# Patient Record
Sex: FEMALE | Race: WHITE | Employment: OTHER | URBAN - METROPOLITAN AREA
[De-identification: names, ages, dates, MRNs, and addresses within clinical notes are randomized per-mention and may not be internally consistent; named-entity substitution may affect disease eponyms.]

---

## 2017-02-28 ENCOUNTER — TELEPHONE (OUTPATIENT)
Dept: CARDIOLOGY CLINIC | Age: 72
End: 2017-02-28

## 2017-05-31 ENCOUNTER — OFFICE VISIT (OUTPATIENT)
Dept: ORTHOPEDIC SURGERY | Age: 72
End: 2017-05-31

## 2017-05-31 VITALS — HEIGHT: 63 IN | WEIGHT: 247 LBS | BODY MASS INDEX: 43.77 KG/M2 | RESPIRATION RATE: 18 BRPM

## 2017-05-31 DIAGNOSIS — M54.42 CHRONIC LEFT-SIDED LOW BACK PAIN WITH LEFT-SIDED SCIATICA: Primary | ICD-10-CM

## 2017-05-31 DIAGNOSIS — R52 PAIN: ICD-10-CM

## 2017-05-31 DIAGNOSIS — G89.29 CHRONIC LEFT-SIDED LOW BACK PAIN WITH LEFT-SIDED SCIATICA: Primary | ICD-10-CM

## 2017-05-31 DIAGNOSIS — M16.12 PRIMARY OSTEOARTHRITIS OF LEFT HIP: ICD-10-CM

## 2017-05-31 DIAGNOSIS — E66.01 MORBID OBESITY WITH BMI OF 40.0-44.9, ADULT (HCC): ICD-10-CM

## 2017-05-31 PROCEDURE — 73502 X-RAY EXAM HIP UNI 2-3 VIEWS: CPT | Performed by: ORTHOPAEDIC SURGERY

## 2017-05-31 PROCEDURE — 99204 OFFICE O/P NEW MOD 45 MIN: CPT | Performed by: ORTHOPAEDIC SURGERY

## 2017-05-31 ASSESSMENT — ENCOUNTER SYMPTOMS
BACK PAIN: 1
GASTROINTESTINAL NEGATIVE: 1
RESPIRATORY NEGATIVE: 1
EYES NEGATIVE: 1

## 2017-06-09 ENCOUNTER — HOSPITAL ENCOUNTER (OUTPATIENT)
Dept: PHYSICAL THERAPY | Age: 72
Discharge: HOME | End: 2017-06-30
Attending: ORTHOPAEDIC SURGERY | Admitting: ORTHOPAEDIC SURGERY

## 2017-06-09 ASSESSMENT — PAIN DESCRIPTION - LOCATION: LOCATION: BACK;LEG;KNEE

## 2017-06-09 ASSESSMENT — PAIN DESCRIPTION - FREQUENCY: FREQUENCY: CONTINUOUS

## 2017-06-09 ASSESSMENT — PAIN DESCRIPTION - PAIN TYPE: TYPE: CHRONIC PAIN

## 2017-06-09 ASSESSMENT — PAIN DESCRIPTION - DESCRIPTORS: DESCRIPTORS: BURNING

## 2017-06-09 ASSESSMENT — PAIN DESCRIPTION - ORIENTATION: ORIENTATION: LEFT

## 2017-06-09 ASSESSMENT — PAIN DESCRIPTION - PROGRESSION: CLINICAL_PROGRESSION: GRADUALLY WORSENING

## 2017-06-09 ASSESSMENT — PAIN SCALES - GENERAL: PAINLEVEL_OUTOF10: 3

## 2017-06-09 ASSESSMENT — PAIN DESCRIPTION - ONSET: ONSET: ON-GOING

## 2017-06-17 PROBLEM — G45.9 TIA (TRANSIENT ISCHEMIC ATTACK): Status: ACTIVE | Noted: 2017-06-17

## 2017-06-23 ENCOUNTER — TELEPHONE (OUTPATIENT)
Dept: CARDIOLOGY CLINIC | Age: 72
End: 2017-06-23

## 2017-06-23 DIAGNOSIS — R53.83 FATIGUE, UNSPECIFIED TYPE: Primary | ICD-10-CM

## 2017-06-23 DIAGNOSIS — I20.0 UNSTABLE ANGINA (HCC): ICD-10-CM

## 2017-06-26 ENCOUNTER — PROCEDURE VISIT (OUTPATIENT)
Dept: CARDIOLOGY CLINIC | Age: 72
End: 2017-06-26

## 2017-06-26 DIAGNOSIS — I20.0 UNSTABLE ANGINA (HCC): ICD-10-CM

## 2017-06-26 DIAGNOSIS — R53.83 FATIGUE, UNSPECIFIED TYPE: Primary | ICD-10-CM

## 2017-06-26 LAB
LV EF: 58 %
LVEF MODALITY: NORMAL

## 2017-06-26 PROCEDURE — 93306 TTE W/DOPPLER COMPLETE: CPT | Performed by: INTERNAL MEDICINE

## 2017-06-29 ENCOUNTER — TELEPHONE (OUTPATIENT)
Dept: CARDIOLOGY CLINIC | Age: 72
End: 2017-06-29

## 2017-07-01 ENCOUNTER — HOSPITAL ENCOUNTER (OUTPATIENT)
Dept: OTHER | Age: 72
Discharge: HOME | End: 2017-07-31
Attending: ORTHOPAEDIC SURGERY | Admitting: ORTHOPAEDIC SURGERY

## 2017-09-16 PROBLEM — R06.00 DYSPNEA: Status: ACTIVE | Noted: 2017-09-16

## 2017-09-16 PROBLEM — R07.9 CHEST PAIN: Status: ACTIVE | Noted: 2017-09-16

## 2017-09-16 PROBLEM — R47.9 SPEECH DISTURBANCE: Status: ACTIVE | Noted: 2017-09-16

## 2017-09-16 PROBLEM — R29.810 FACIAL DROOP: Status: ACTIVE | Noted: 2017-09-16

## 2017-09-16 PROBLEM — D64.9 ANEMIA: Chronic | Status: ACTIVE | Noted: 2017-09-16

## 2017-09-16 PROBLEM — F41.9 ANXIETY: Chronic | Status: ACTIVE | Noted: 2017-09-16

## 2017-09-16 PROBLEM — N18.30 CKD (CHRONIC KIDNEY DISEASE) STAGE 3, GFR 30-59 ML/MIN (HCC): Chronic | Status: ACTIVE | Noted: 2017-09-16

## 2017-09-19 PROBLEM — R06.00 DYSPNEA: Status: RESOLVED | Noted: 2017-09-16 | Resolved: 2017-09-19

## 2017-09-19 PROBLEM — E72.11 HYPERHOMOCYSTEINEMIA (HCC): Status: ACTIVE | Noted: 2017-09-19

## 2017-09-19 PROBLEM — R07.9 CHEST PAIN: Status: RESOLVED | Noted: 2017-09-16 | Resolved: 2017-09-19

## 2017-09-19 PROBLEM — R47.9 SPEECH DISTURBANCE: Status: RESOLVED | Noted: 2017-09-16 | Resolved: 2017-09-19

## 2017-09-19 PROBLEM — R29.810 FACIAL DROOP: Status: RESOLVED | Noted: 2017-09-16 | Resolved: 2017-09-19

## 2018-04-11 PROBLEM — R07.9 CHEST PAIN WITH HIGH RISK OF ACUTE CORONARY SYNDROME: Status: ACTIVE | Noted: 2018-04-11

## 2018-08-02 PROBLEM — E66.812 CLASS 2 OBESITY IN ADULT: Status: ACTIVE | Noted: 2018-08-02

## 2018-08-02 PROBLEM — E66.9 CLASS 2 OBESITY IN ADULT: Status: ACTIVE | Noted: 2018-08-02

## 2018-08-02 PROBLEM — R29.90 STROKE-LIKE SYMPTOMS: Status: ACTIVE | Noted: 2018-08-02

## 2018-08-02 PROBLEM — E11.65 HYPERGLYCEMIA DUE TO TYPE 2 DIABETES MELLITUS (HCC): Status: ACTIVE | Noted: 2018-08-02

## 2018-08-02 PROBLEM — I10 ESSENTIAL HYPERTENSION: Status: ACTIVE | Noted: 2018-08-02

## 2018-08-02 PROBLEM — R20.2 PARESTHESIAS IN RIGHT HAND: Status: ACTIVE | Noted: 2018-08-02

## 2018-08-02 PROBLEM — R73.9 ACUTE HYPERGLYCEMIA: Status: ACTIVE | Noted: 2018-08-02

## 2018-09-05 ENCOUNTER — OFFICE VISIT (OUTPATIENT)
Dept: CARDIOLOGY CLINIC | Age: 73
End: 2018-09-05

## 2018-09-05 VITALS
SYSTOLIC BLOOD PRESSURE: 126 MMHG | WEIGHT: 225.4 LBS | BODY MASS INDEX: 39.93 KG/M2 | HEART RATE: 64 BPM | DIASTOLIC BLOOD PRESSURE: 68 MMHG | RESPIRATION RATE: 20 BRPM

## 2018-09-05 DIAGNOSIS — I10 ESSENTIAL HYPERTENSION: Primary | ICD-10-CM

## 2018-09-05 DIAGNOSIS — G45.9 TRANSIENT CEREBRAL ISCHEMIA, UNSPECIFIED TYPE: ICD-10-CM

## 2018-09-05 DIAGNOSIS — E78.5 DYSLIPIDEMIA: ICD-10-CM

## 2018-09-05 PROCEDURE — 99213 OFFICE O/P EST LOW 20 MIN: CPT | Performed by: NURSE PRACTITIONER

## 2018-09-05 RX ORDER — LISINOPRIL AND HYDROCHLOROTHIAZIDE 12.5; 1 MG/1; MG/1
1 TABLET ORAL DAILY
COMMUNITY
End: 2019-10-30 | Stop reason: SDUPTHER

## 2018-09-05 NOTE — PROGRESS NOTES
Office visit for surgical clearance       Raymundo Canseco is a 68 y.o. female with TIA, HTN and dyslipidemia  being seen today for cardiac clearance for hip replacement surgery. HPI : Patient does not have history of CAD with no angina. Last cardiac cath was 2016 that showed no significant CAD. The patient's functional status is fair. She reports they cannot walk up a flight if stairs/walk at least a block d/t hip and knee pain. There is no history of Systolic / Diastolic CHF, last known EF is  59%. There is not a history of VHD wth mitral/aortic. There is not a history of a-fib and the patient is not on anticoagulation. The patient is on antiplatelet therapy.                                         Ezequiel Schlatter has the following history:     Patient Active Problem List    Diagnosis Date Noted    Intercostal pain      Priority: High    Essential hypertension      Priority: High    Dyslipidemia      Priority: High    Encounter for screening for diabetes mellitus      Priority: High    Paresthesias in right hand 08/02/2018    Essential hypertension 08/02/2018    Stroke-like symptoms 08/02/2018    [facility]gly[facility] to [facility] 08/02/2018    Class 2 obesity in adult 08/02/2018    Chest pain with high risk of acute coronary syndrome 04/11/2018    Hyperhomocysteinemia ([facility] 09/19/2017    Chest pain 09/16/2017    Anxiety 09/16/2017    Anemia 09/16/2017    CKD (chronic kidney disease) stage 3, GFR 30-59 ml/min 09/16/2017    TIA (transient ischemic attack) 06/17/2017    Chronic left-sided low back pain with left-sided sciatica 05/31/2017    Primary osteoarthritis of left hip 05/31/2017    Morbid obesity with BMI of 40.0-44.9, adult ([facility] 05/31/2017    Unstable angina (HCC)     Chronic kidney disease, stage III (moderate) 08/02/2016    Arthritis 08/02/2016    Abdominal pain 09/07/2013    Status post gastric bypass for obesity 09/07/2013    CHARLIE (generalized anxiety disorder) 09/07/2013    HTN (hypertension) 09/07/2013    Hyperlipidemia 09/07/2013       Current Outpatient Prescriptions   Medication Sig Dispense Refill    aspirin 81 MG EC tablet Take 1 tablet by mouth daily 30 tablet 3    vitamin B-6 (PYRIDOXINE) 50 MG tablet Take 100 mg by mouth daily      Cyanocobalamin (VITAMIN B 12 PO) Take 1 tablet by mouth daily      oxyCODONE-acetaminophen (PERCOCET) 7.5-325 MG per tablet Take 1 tablet by mouth 3 times daily as needed. Kelin Sanonstein 0    diclofenac sodium 1 % GEL Apply topically 3 times daily as needed 04/11/18 Applies to lower back area and  down      amitriptyline (ELAVIL) 25 MG tablet Take 25 mg by mouth nightly      ferrous sulfate 325 (65 Fe) MG tablet Take 325 mg by mouth daily (with breakfast)      pravastatin (PRAVACHOL) 40 MG tablet Take 40 mg by mouth nightly      ALPRAZolam (XANAX) 1 MG tablet Take 1 mg by mouth daily. Maylon Electra gabapentin (NEURONTIN) 600 MG tablet Take 600 mg by mouth 3 times daily. Maylon Electra sertraline (ZOLOFT) 100 MG tablet Take 100 mg by mouth nightly      Turmeric 500 MG TABS Take 500 mg by mouth daily      diphenhydrAMINE (BENADRYL) 25 MG tablet Take 25 mg by mouth nightly        MG capsule Take 100 mg by mouth daily      folic acid-pyridoxine-cyancobalamin (FOLTX) 1.13-25-2 MG TABS Take 1 tablet by mouth daily 30 tablet 3    dipyridamole-aspirin (AGGRENOX)  MG per extended release capsule Take 1 capsule by mouth 2 times daily 60 capsule 3    midodrine (PROAMATINE) 5 MG tablet Take 1 tablet by mouth 2 times daily (with meals) 90 tablet 3    celecoxib (CELEBREX) 200 MG capsule Take 200 mg by mouth daily      Omega-3 Fatty Acids (FISH OIL PO) Take 1 capsule by mouth daily       acetaminophen (TYLENOL) 500 MG tablet Take 500 mg by mouth as needed for Pain      omeprazole (PRILOSEC) 20 MG capsule Take 20 mg by mouth 2 times daily       Calcium Citrate-Vitamin D 200-200 MG-UNIT TABS Take  by mouth daily.          No current facility-administered medications for this visit. Allergies   Allergen Reactions    Latex        Past Medical History:   Diagnosis Date    (HFpEF) heart failure with preserved ejection fraction (HCC)     Chronic anemia     Chronic kidney disease     Chronic kidney disease, stage III (moderate) 8/2/2016    Chronic pain     right hip and knee, pain management, Ligia Menjivar PA-C [place]    Diabetes mellitus ([facility]     H/O percutaneous left heart catheterization 12/13/2016    False positive. No CAD    History of nuclear stress test 12/06/2016    lexiscan-mild ischemia mid inferior,EF70%    Hx of echocardiogram 06/26/2017    EF 55-60%. Grade 1 diastolic dysfunction.  Hyperhomocysteinemia ([facility]     Hyperlipidemia     Hypertension     Neuropathy     Orthostasis      Past Surgical History:   Procedure Laterality Date    CHOLECYSTECTOMY      EYE SURGERY      GASTRIC BYPASS SURGERY  2010    HYSTERECTOMY      TOTAL KNEE ARTHROPLASTY Right      Social History   Substance Use Topics    Smoking status: Never Smoker    Smokeless tobacco: Never Used    Alcohol use No        Review of Systems:    Constitutional: Negative for diaphoresis and fatigue  Psychological:Negative for anxiety or depression  HENT: Negative for headaches, nasal congestion, sinus pain or vertigo  Eyes: Negative for visual disturbance.    Endocrine: Negative for polydipsia/polyuria  Respiratory: Negative for shortness of breath  Cardiovascular: Negative for chest pain, dyspnea on exertion, claudication, edema, irregular heartbeat, murmur, palpitations or shortness of breath  Gastrointestinal: Negative for abdominal pain or heartburn  Genito-Urinary: Negative for urinary frequency/urgency  Musculoskeletal: Negative for muscle pain, muscular weakness, negative for pain in arm and leg or swelling in foot and leg  Neurological: Negative for dizziness, headaches, memory loss, numbness/tingling, visual changes,

## 2018-09-10 ENCOUNTER — HOSPITAL ENCOUNTER (OUTPATIENT)
Dept: OTHER | Age: 73
Discharge: HOME | End: 2018-09-10
Attending: ORTHOPAEDIC SURGERY | Admitting: ORTHOPAEDIC SURGERY

## 2018-09-14 ENCOUNTER — TELEPHONE (OUTPATIENT)
Dept: CARDIOLOGY CLINIC | Age: 73
End: 2018-09-14

## 2018-10-26 LAB
AVERAGE GLUCOSE: 95
HBA1C MFR BLD: 5.3 %

## 2019-02-19 ENCOUNTER — TELEPHONE (OUTPATIENT)
Dept: CARDIOLOGY CLINIC | Age: 74
End: 2019-02-19

## 2019-03-04 ENCOUNTER — OFFICE VISIT (OUTPATIENT)
Dept: CARDIOLOGY CLINIC | Age: 74
End: 2019-03-04
Payer: COMMERCIAL

## 2019-03-04 VITALS
BODY MASS INDEX: 40.36 KG/M2 | HEIGHT: 63 IN | SYSTOLIC BLOOD PRESSURE: 118 MMHG | HEART RATE: 60 BPM | DIASTOLIC BLOOD PRESSURE: 80 MMHG | WEIGHT: 227.8 LBS

## 2019-03-04 DIAGNOSIS — E78.5 DYSLIPIDEMIA: Primary | ICD-10-CM

## 2019-03-04 PROCEDURE — 1101F PT FALLS ASSESS-DOCD LE1/YR: CPT | Performed by: INTERNAL MEDICINE

## 2019-03-04 PROCEDURE — 4040F PNEUMOC VAC/ADMIN/RCVD: CPT | Performed by: INTERNAL MEDICINE

## 2019-03-04 PROCEDURE — 99214 OFFICE O/P EST MOD 30 MIN: CPT | Performed by: INTERNAL MEDICINE

## 2019-03-04 PROCEDURE — G8599 NO ASA/ANTIPLAT THER USE RNG: HCPCS | Performed by: INTERNAL MEDICINE

## 2019-03-04 PROCEDURE — G8427 DOCREV CUR MEDS BY ELIG CLIN: HCPCS | Performed by: INTERNAL MEDICINE

## 2019-03-04 PROCEDURE — 1090F PRES/ABSN URINE INCON ASSESS: CPT | Performed by: INTERNAL MEDICINE

## 2019-03-04 PROCEDURE — G8400 PT W/DXA NO RESULTS DOC: HCPCS | Performed by: INTERNAL MEDICINE

## 2019-03-04 PROCEDURE — 1036F TOBACCO NON-USER: CPT | Performed by: INTERNAL MEDICINE

## 2019-03-04 PROCEDURE — 1123F ACP DISCUSS/DSCN MKR DOCD: CPT | Performed by: INTERNAL MEDICINE

## 2019-03-04 PROCEDURE — G8484 FLU IMMUNIZE NO ADMIN: HCPCS | Performed by: INTERNAL MEDICINE

## 2019-03-04 PROCEDURE — 3017F COLORECTAL CA SCREEN DOC REV: CPT | Performed by: INTERNAL MEDICINE

## 2019-03-04 PROCEDURE — G8417 CALC BMI ABV UP PARAM F/U: HCPCS | Performed by: INTERNAL MEDICINE

## 2019-03-05 ENCOUNTER — TELEPHONE (OUTPATIENT)
Dept: CARDIOLOGY CLINIC | Age: 74
End: 2019-03-05

## 2019-05-07 RX ORDER — MIDODRINE HYDROCHLORIDE 5 MG/1
5 TABLET ORAL 2 TIMES DAILY WITH MEALS
Qty: 180 TABLET | Refills: 0 | Status: SHIPPED | OUTPATIENT
Start: 2019-05-07 | End: 2019-10-30 | Stop reason: SDUPTHER

## 2019-05-07 RX ORDER — GABAPENTIN 600 MG/1
600 TABLET ORAL 3 TIMES DAILY
Qty: 90 TABLET | Refills: 0 | Status: SHIPPED | OUTPATIENT
Start: 2019-05-07 | End: 2019-07-01 | Stop reason: SDUPTHER

## 2019-05-15 ENCOUNTER — TELEPHONE (OUTPATIENT)
Dept: FAMILY MEDICINE CLINIC | Age: 74
End: 2019-05-15

## 2019-05-16 ENCOUNTER — TELEPHONE (OUTPATIENT)
Dept: FAMILY MEDICINE CLINIC | Age: 74
End: 2019-05-16

## 2019-05-16 NOTE — TELEPHONE ENCOUNTER
----- Message from Naida Sanon sent at 5/16/2019 11:12 AM EDT -----  Contact: CAROL  XANAX 0.5 MG [facility] T 2ND TIME CALLING. WOULD LIKE A CALL WHEN ITS DONE

## 2019-05-17 RX ORDER — OMEPRAZOLE 20 MG/1
CAPSULE, DELAYED RELEASE ORAL
Qty: 180 CAPSULE | Refills: 1 | Status: SHIPPED | OUTPATIENT
Start: 2019-05-17 | End: 2019-10-30 | Stop reason: SDUPTHER

## 2019-06-05 DIAGNOSIS — D64.9 NORMOCYTIC ANEMIA: ICD-10-CM

## 2019-06-05 DIAGNOSIS — E66.01 MORBID OBESITY (HCC): ICD-10-CM

## 2019-06-05 DIAGNOSIS — F32.A MODERATE DEPRESSIVE DISORDER: ICD-10-CM

## 2019-06-05 RX ORDER — FOLIC ACID 1 MG/1
1 TABLET ORAL DAILY
COMMUNITY
End: 2019-06-24

## 2019-06-05 RX ORDER — CLOPIDOGREL BISULFATE 75 MG/1
75 TABLET ORAL DAILY
COMMUNITY
End: 2019-06-24

## 2019-06-05 RX ORDER — RANITIDINE 150 MG/1
150 CAPSULE ORAL 2 TIMES DAILY
COMMUNITY
End: 2019-06-24

## 2019-06-05 RX ORDER — DOCUSATE SODIUM 100 MG/1
100 CAPSULE, LIQUID FILLED ORAL 2 TIMES DAILY
COMMUNITY
End: 2019-07-26 | Stop reason: SDUPTHER

## 2019-06-17 RX ORDER — ASPIRIN AND DIPYRIDAMOLE 25; 200 MG/1; MG/1
CAPSULE, EXTENDED RELEASE ORAL
Qty: 180 CAPSULE | Refills: 1 | Status: SHIPPED | OUTPATIENT
Start: 2019-06-17 | End: 2019-10-30 | Stop reason: SDUPTHER

## 2019-06-17 NOTE — TELEPHONE ENCOUNTER
----- Message from Erlinda Munoz sent at 6/17/2019  3:24 PM EDT -----  Contact: [facility]  PLAN OF CARE OF FAXED ON 5/30. DID WE EVER RECEIVE IT? HAS YET TO GET IT BACK.    [phone]

## 2019-06-24 ENCOUNTER — OFFICE VISIT (OUTPATIENT)
Dept: FAMILY MEDICINE CLINIC | Age: 74
End: 2019-06-24
Payer: COMMERCIAL

## 2019-06-24 VITALS
DIASTOLIC BLOOD PRESSURE: 62 MMHG | HEIGHT: 61 IN | TEMPERATURE: 98.4 F | BODY MASS INDEX: 42.18 KG/M2 | SYSTOLIC BLOOD PRESSURE: 102 MMHG | OXYGEN SATURATION: 95 % | HEART RATE: 52 BPM | WEIGHT: 223.4 LBS

## 2019-06-24 DIAGNOSIS — M15.9 PRIMARY OSTEOARTHRITIS INVOLVING MULTIPLE JOINTS: ICD-10-CM

## 2019-06-24 DIAGNOSIS — F32.A MODERATE DEPRESSIVE DISORDER: ICD-10-CM

## 2019-06-24 DIAGNOSIS — M48.062 SPINAL STENOSIS OF LUMBAR REGION WITH NEUROGENIC CLAUDICATION: ICD-10-CM

## 2019-06-24 DIAGNOSIS — G47.33 OBSTRUCTIVE SLEEP APNEA SYNDROME: ICD-10-CM

## 2019-06-24 DIAGNOSIS — F41.1 GAD (GENERALIZED ANXIETY DISORDER): ICD-10-CM

## 2019-06-24 DIAGNOSIS — I10 ESSENTIAL HYPERTENSION: ICD-10-CM

## 2019-06-24 DIAGNOSIS — E11.9 TYPE 2 DIABETES MELLITUS WITHOUT COMPLICATION, WITHOUT LONG-TERM CURRENT USE OF INSULIN (HCC): Primary | ICD-10-CM

## 2019-06-24 LAB
BILIRUBIN, POC: NORMAL
BLOOD URINE, POC: NORMAL
CLARITY, POC: NORMAL
COLOR, POC: NORMAL
GLUCOSE URINE, POC: NORMAL
KETONES, POC: NORMAL
LEUKOCYTE EST, POC: POSITIVE
NITRITE, POC: NORMAL
PH, POC: 5
PROTEIN, POC: NORMAL
SPECIFIC GRAVITY, POC: 1.02
UROBILINOGEN, POC: NORMAL

## 2019-06-24 PROCEDURE — 99214 OFFICE O/P EST MOD 30 MIN: CPT | Performed by: FAMILY MEDICINE

## 2019-06-24 RX ORDER — PREDNISONE 20 MG/1
TABLET ORAL
Qty: 9 TABLET | Refills: 0 | Status: SHIPPED | OUTPATIENT
Start: 2019-06-24 | End: 2019-10-30

## 2019-06-24 RX ORDER — AMITRIPTYLINE HYDROCHLORIDE 50 MG/1
50 TABLET, FILM COATED ORAL NIGHTLY
Qty: 90 TABLET | Refills: 1 | Status: SHIPPED | OUTPATIENT
Start: 2019-06-24 | End: 2019-10-30 | Stop reason: SDUPTHER

## 2019-06-24 ASSESSMENT — ENCOUNTER SYMPTOMS
RHINORRHEA: 0
SINUS PRESSURE: 0
SHORTNESS OF BREATH: 0
CHEST TIGHTNESS: 0
CONSTIPATION: 0
SORE THROAT: 0
ABDOMINAL PAIN: 0
DIARRHEA: 0
COUGH: 0

## 2019-06-25 ENCOUNTER — TELEPHONE (OUTPATIENT)
Dept: FAMILY MEDICINE CLINIC | Age: 74
End: 2019-06-25

## 2019-06-25 NOTE — TELEPHONE ENCOUNTER
CALL FROM PHARMACY, NEED TO CLARIFY INFO ON RX HANDWRITTEN. PLEASE CALL.  /GLO  Electronically signed by Mendel Rear, MA on 6/25/2019 at 9:46 AM

## 2019-06-27 ENCOUNTER — TELEPHONE (OUTPATIENT)
Dept: FAMILY MEDICINE CLINIC | Age: 74
End: 2019-06-27

## 2019-06-27 NOTE — TELEPHONE ENCOUNTER
----- Message from Anjelica Garcia sent at 6/27/2019 10:20 AM EDT -----  Contact: [address] [place] [address] - NEED TO KNOW HOW MANY TIMES A DAY SHE TESTS.    [phone]

## 2019-06-28 ENCOUNTER — TELEPHONE (OUTPATIENT)
Dept: FAMILY MEDICINE CLINIC | Age: 74
End: 2019-06-28

## 2019-06-28 NOTE — TELEPHONE ENCOUNTER
----- Message from Chalo Ann sent at 6/27/2019 10:20 AM EDT -----  Contact: [address] [place] [address] - NEED TO KNOW HOW MANY TIMES A DAY SHE TESTS.    [phone]

## 2019-07-02 ENCOUNTER — TELEPHONE (OUTPATIENT)
Dept: FAMILY MEDICINE CLINIC | Age: 74
End: 2019-07-02

## 2019-07-17 ENCOUNTER — OFFICE VISIT (OUTPATIENT)
Dept: FAMILY MEDICINE CLINIC | Age: 74
End: 2019-07-17
Payer: COMMERCIAL

## 2019-07-17 VITALS
WEIGHT: 224.9 LBS | OXYGEN SATURATION: 97 % | HEART RATE: 55 BPM | DIASTOLIC BLOOD PRESSURE: 84 MMHG | BODY MASS INDEX: 42.46 KG/M2 | HEIGHT: 61 IN | SYSTOLIC BLOOD PRESSURE: 126 MMHG

## 2019-07-17 DIAGNOSIS — I10 ESSENTIAL HYPERTENSION: ICD-10-CM

## 2019-07-17 DIAGNOSIS — E11.9 TYPE 2 DIABETES MELLITUS WITHOUT COMPLICATION, WITHOUT LONG-TERM CURRENT USE OF INSULIN (HCC): ICD-10-CM

## 2019-07-17 DIAGNOSIS — N18.30 CHRONIC KIDNEY DISEASE, STAGE III (MODERATE) (HCC): ICD-10-CM

## 2019-07-17 DIAGNOSIS — M54.50 BILATERAL LOW BACK PAIN WITHOUT SCIATICA, UNSPECIFIED CHRONICITY: ICD-10-CM

## 2019-07-17 DIAGNOSIS — E72.11 HYPERHOMOCYSTEINEMIA (HCC): ICD-10-CM

## 2019-07-17 DIAGNOSIS — R35.0 URINARY FREQUENCY: ICD-10-CM

## 2019-07-17 DIAGNOSIS — E11.9 TYPE 2 DIABETES MELLITUS WITHOUT COMPLICATION, WITHOUT LONG-TERM CURRENT USE OF INSULIN (HCC): Primary | ICD-10-CM

## 2019-07-17 DIAGNOSIS — D64.9 NORMOCYTIC ANEMIA: ICD-10-CM

## 2019-07-17 DIAGNOSIS — M15.9 PRIMARY OSTEOARTHRITIS INVOLVING MULTIPLE JOINTS: ICD-10-CM

## 2019-07-17 LAB
A/G RATIO: 1.6 (ref 1.1–2.2)
ALBUMIN SERPL-MCNC: 4.2 G/DL (ref 3.4–5)
ALP BLD-CCNC: 96 U/L (ref 40–129)
ALT SERPL-CCNC: 11 U/L (ref 10–40)
ANION GAP SERPL CALCULATED.3IONS-SCNC: 15 MMOL/L (ref 3–16)
AST SERPL-CCNC: 19 U/L (ref 15–37)
BASOPHILS ABSOLUTE: 0 K/UL (ref 0–0.2)
BASOPHILS RELATIVE PERCENT: 0.5 %
BILIRUB SERPL-MCNC: 0.3 MG/DL (ref 0–1)
BILIRUBIN URINE: NEGATIVE
BLOOD, URINE: NEGATIVE
BUN BLDV-MCNC: 15 MG/DL (ref 7–20)
CALCIUM SERPL-MCNC: 9.5 MG/DL (ref 8.3–10.6)
CHLORIDE BLD-SCNC: 106 MMOL/L (ref 99–110)
CHOLESTEROL, TOTAL: 159 MG/DL (ref 0–199)
CLARITY: CLEAR
CO2: 22 MMOL/L (ref 21–32)
COLOR: YELLOW
CREAT SERPL-MCNC: 0.9 MG/DL (ref 0.6–1.2)
CREATININE URINE: 79.5 MG/DL (ref 28–259)
EOSINOPHILS ABSOLUTE: 0.1 K/UL (ref 0–0.6)
EOSINOPHILS RELATIVE PERCENT: 2 %
EPITHELIAL CELLS, UA: 2 /HPF (ref 0–5)
FERRITIN: 110.2 NG/ML (ref 15–150)
FOLATE: 15.84 NG/ML (ref 4.78–24.2)
GFR AFRICAN AMERICAN: >60
GFR NON-AFRICAN AMERICAN: >60
GLOBULIN: 2.6 G/DL
GLUCOSE BLD-MCNC: 93 MG/DL (ref 70–99)
GLUCOSE URINE: NEGATIVE MG/DL
HCT VFR BLD CALC: 35.3 % (ref 36–48)
HDLC SERPL-MCNC: 74 MG/DL (ref 40–60)
HEMOGLOBIN: 11.6 G/DL (ref 12–16)
HOMOCYSTEINE: 8 UMOL/L (ref 0–10)
HYALINE CASTS: 1 /LPF (ref 0–8)
IRON SATURATION: 29 % (ref 15–50)
IRON: 88 UG/DL (ref 37–145)
KETONES, URINE: NEGATIVE MG/DL
LDL CHOLESTEROL CALCULATED: 64 MG/DL
LEUKOCYTE ESTERASE, URINE: NEGATIVE
LYMPHOCYTES ABSOLUTE: 1.8 K/UL (ref 1–5.1)
LYMPHOCYTES RELATIVE PERCENT: 36.8 %
MCH RBC QN AUTO: 28.7 PG (ref 26–34)
MCHC RBC AUTO-ENTMCNC: 32.7 G/DL (ref 31–36)
MCV RBC AUTO: 87.8 FL (ref 80–100)
MICROALBUMIN UR-MCNC: <1.2 MG/DL
MICROALBUMIN/CREAT UR-RTO: NORMAL MG/G (ref 0–30)
MICROSCOPIC EXAMINATION: NORMAL
MONOCYTES ABSOLUTE: 0.3 K/UL (ref 0–1.3)
MONOCYTES RELATIVE PERCENT: 5.3 %
NEUTROPHILS ABSOLUTE: 2.6 K/UL (ref 1.7–7.7)
NEUTROPHILS RELATIVE PERCENT: 55.4 %
NITRITE, URINE: NEGATIVE
PDW BLD-RTO: 13.9 % (ref 12.4–15.4)
PH UA: 6 (ref 5–8)
PLATELET # BLD: 309 K/UL (ref 135–450)
PMV BLD AUTO: 8.2 FL (ref 5–10.5)
POTASSIUM SERPL-SCNC: 4.2 MMOL/L (ref 3.5–5.1)
PROTEIN UA: NEGATIVE MG/DL
RBC # BLD: 4.03 M/UL (ref 4–5.2)
RBC UA: 0 /HPF (ref 0–4)
SODIUM BLD-SCNC: 143 MMOL/L (ref 136–145)
SPECIFIC GRAVITY UA: 1.01 (ref 1–1.03)
TOTAL IRON BINDING CAPACITY: 300 UG/DL (ref 260–445)
TOTAL PROTEIN: 6.8 G/DL (ref 6.4–8.2)
TRIGL SERPL-MCNC: 107 MG/DL (ref 0–150)
TSH REFLEX FT4: 1.62 UIU/ML (ref 0.27–4.2)
UROBILINOGEN, URINE: 0.2 E.U./DL
VLDLC SERPL CALC-MCNC: 21 MG/DL
WBC # BLD: 4.8 K/UL (ref 4–11)
WBC UA: 1 /HPF (ref 0–5)

## 2019-07-17 PROCEDURE — 99213 OFFICE O/P EST LOW 20 MIN: CPT | Performed by: PHYSICIAN ASSISTANT

## 2019-07-17 NOTE — PROGRESS NOTES
total lt shoulder     SHOULDER SURGERY  2006    total rt shoulder    TOTAL KNEE ARTHROPLASTY Right     TOTAL KNEE ARTHROPLASTY  02/2011    left       CURRENT MEDICATIONS  Current Outpatient Medications   Medication Sig Dispense Refill    gabapentin (NEURONTIN) 600 MG tablet TAKE 1 TABLET BY MOUTH THREE TIMES DAILY FOR 30 DAYS 90 tablet 2    amitriptyline (ELAVIL) 50 MG tablet Take 1 tablet by mouth nightly 90 tablet 1    predniSONE (DELTASONE) 20 MG tablet One BID for 3d, then 1 qam for 3d 9 tablet 0    aspirin-dipyridamole (AGGRENOX)  MG per extended release capsule TAKE 1 CAPSULE BY MOUTH TWICE DAILY 180 capsule 1    docusate sodium (COLACE) 100 MG capsule Take 100 mg by mouth 2 times daily      ZINC METHIONATE PO Take by mouth      Ascorbic Acid (VITAMIN C PO) Take by mouth      omeprazole (PRILOSEC) 20 MG delayed release capsule TAKE 1 CAPSULE BY MOUTH TWICE DAILY 180 capsule 1    midodrine (PROAMATINE) 5 MG tablet Take 1 tablet by mouth 2 times daily (with meals) 180 tablet 0    lisinopril-hydrochlorothiazide (PRINZIDE;ZESTORETIC) 10-12.5 MG per tablet Take 1 tablet by mouth daily      aspirin 81 MG EC tablet Take 1 tablet by mouth daily 30 tablet 3    vitamin B-6 (PYRIDOXINE) 50 MG tablet Take 100 mg by mouth daily      oxyCODONE-acetaminophen (PERCOCET) 7.5-325 MG per tablet Take 1 tablet by mouth 3 times daily as needed. Betha Lute 0    diclofenac sodium 1 % GEL Apply topically 3 times daily as needed 04/11/18 Applies to lower back area and  down      ferrous sulfate 325 (65 Fe) MG tablet Take 325 mg by mouth daily (with breakfast)      pravastatin (PRAVACHOL) 40 MG tablet Take 40 mg by mouth nightly      ALPRAZolam (XANAX) 1 MG tablet Take 1 mg by mouth daily. Tresia Mireya sertraline (ZOLOFT) 100 MG tablet Take 100 mg by mouth nightly      diphenhydrAMINE (BENADRYL) 25 MG tablet Take 25 mg by mouth nightly       celecoxib (CELEBREX) 200 MG capsule Take 200 mg by mouth daily      Omega-3 Fatty Acids (FISH OIL PO) Take 1 capsule by mouth daily       acetaminophen (TYLENOL) 500 MG tablet Take 500 mg by mouth as needed for Pain      Calcium Citrate-Vitamin D 200-200 MG-UNIT TABS Take  by mouth daily. No current facility-administered medications for this visit.         ALLERGIES  Allergies   Allergen Reactions    Latex     Percocet [Oxycodone-Acetaminophen] Rash       PHYSICAL EXAM    /84   Pulse 55   Ht 5' 1\" (1.549 m)   Wt 224 lb 14.4 oz (102 kg)   SpO2 97%   BMI 42.49 kg/m²     Constitutional:  Well developed, well nourished  HENT:  Normocephalic, atraumatic, bilateral external ears normal, oropharynx moist, nose normal  Eyes:  PERRLA, EOMI, conjunctiva normal, no discharge, no scleral icterus  Neck:  Normal range of motion, no tenderness, supple, no thyromegaly no carotid bruits noted  Lymphatic:  No lymphadenopathy noted  Cardiovascular:  Normal heart rate, normal rhythm, no murmurs, gallops or rubs  Thorax & Lungs:  Normal breath sounds, no respiratory distress, no wheezing  Abdomen:  Soft, no tenderness, no masses, no pulsatile masses, not distended, bowel sounds normal  Skin:  Warm, dry, no erythema, no rash  Back:  No tenderness, No CVA tenderness  Extremities:  No edema, no tenderness, no cyanosis, no clubbing  Visual inspection:  Deformity/amputation: absent  Skin lesions/pre-ulcerative calluses: absent  Edema: right- negative, left- negative    Sensory exam:  Monofilament sensation: normal  (minimum of 5 random plantar locations tested, avoiding callused areas - > 1 area with absence of sensation is + for neuropathy)    Plus at least one of the following:  Pulses: normal,   Musculoskeletal:  Good range of motion  all major joints, no tenderness to palpation or major deformities noted  Neurologic:  Alert & oriented X 3, normal motor function, normal sensory function, no focal deficits noted  Psychiatric:  Affect normal, mood normal    ASSESSMENT & PLAN    Javy Kwon was seen today

## 2019-07-18 LAB
ESTIMATED AVERAGE GLUCOSE: 131.2 MG/DL
HBA1C MFR BLD: 6.2 %

## 2019-07-18 RX ORDER — ALPRAZOLAM 0.5 MG/1
TABLET ORAL
Qty: 45 TABLET | Refills: 0 | OUTPATIENT
Start: 2019-07-18

## 2019-07-18 NOTE — TELEPHONE ENCOUNTER
Has she seen pain management? We can do an x ray of her Lumbar spine.  Is she still taking her gabapentin and Pain Rx

## 2019-07-19 DIAGNOSIS — F41.9 ANXIETY: Primary | Chronic | ICD-10-CM

## 2019-07-19 LAB — URINE CULTURE, ROUTINE: NORMAL

## 2019-07-19 RX ORDER — ALPRAZOLAM 1 MG/1
TABLET ORAL
Qty: 45 TABLET | Refills: 0 | Status: SHIPPED | OUTPATIENT
Start: 2019-07-19 | End: 2019-08-19

## 2019-07-19 NOTE — TELEPHONE ENCOUNTER
Has she seen pain management? We can do an x ray of her Lumbar spine. Is she still taking her gabapentin and Pain Rx per [facility]      Left message for patient to return call.

## 2019-07-26 RX ORDER — DOCUSATE SODIUM 100 MG/1
CAPSULE ORAL
Qty: 90 CAPSULE | Refills: 1 | Status: SHIPPED | OUTPATIENT
Start: 2019-07-26 | End: 2021-05-28

## 2019-07-26 RX ORDER — CELECOXIB 200 MG/1
CAPSULE ORAL
Qty: 180 CAPSULE | Refills: 1 | Status: SHIPPED | OUTPATIENT
Start: 2019-07-26 | End: 2019-10-30 | Stop reason: SDUPTHER

## 2019-08-12 RX ORDER — HYDROGEN PEROXIDE 2.65 ML/100ML
LIQUID ORAL; TOPICAL
Qty: 30 TABLET | Refills: 2 | Status: SHIPPED | OUTPATIENT
Start: 2019-08-12 | End: 2020-02-04

## 2019-09-19 ENCOUNTER — TELEPHONE (OUTPATIENT)
Dept: FAMILY MEDICINE CLINIC | Age: 74
End: 2019-09-19

## 2019-09-25 ENCOUNTER — TELEPHONE (OUTPATIENT)
Dept: FAMILY MEDICINE CLINIC | Age: 74
End: 2019-09-25

## 2019-09-30 RX ORDER — SERTRALINE HYDROCHLORIDE 100 MG/1
TABLET, FILM COATED ORAL
Qty: 90 TABLET | Refills: 1 | Status: SHIPPED | OUTPATIENT
Start: 2019-09-30 | End: 2019-10-30 | Stop reason: SDUPTHER

## 2019-10-25 DIAGNOSIS — F41.9 ANXIETY: Primary | Chronic | ICD-10-CM

## 2019-10-30 ENCOUNTER — OFFICE VISIT (OUTPATIENT)
Dept: FAMILY MEDICINE CLINIC | Age: 74
End: 2019-10-30
Payer: COMMERCIAL

## 2019-10-30 VITALS
HEART RATE: 56 BPM | BODY MASS INDEX: 42.59 KG/M2 | SYSTOLIC BLOOD PRESSURE: 100 MMHG | WEIGHT: 225.6 LBS | HEIGHT: 61 IN | DIASTOLIC BLOOD PRESSURE: 62 MMHG

## 2019-10-30 DIAGNOSIS — E11.9 TYPE 2 DIABETES MELLITUS WITHOUT COMPLICATION, WITHOUT LONG-TERM CURRENT USE OF INSULIN (HCC): Primary | ICD-10-CM

## 2019-10-30 DIAGNOSIS — F32.9 REACTIVE DEPRESSION: ICD-10-CM

## 2019-10-30 DIAGNOSIS — Z23 NEEDS FLU SHOT: ICD-10-CM

## 2019-10-30 DIAGNOSIS — N18.30 CKD (CHRONIC KIDNEY DISEASE) STAGE 3, GFR 30-59 ML/MIN (HCC): Chronic | ICD-10-CM

## 2019-10-30 DIAGNOSIS — E78.5 DYSLIPIDEMIA: ICD-10-CM

## 2019-10-30 DIAGNOSIS — E11.9 TYPE 2 DIABETES MELLITUS WITHOUT COMPLICATION, WITHOUT LONG-TERM CURRENT USE OF INSULIN (HCC): ICD-10-CM

## 2019-10-30 DIAGNOSIS — M48.062 SPINAL STENOSIS OF LUMBAR REGION WITH NEUROGENIC CLAUDICATION: ICD-10-CM

## 2019-10-30 LAB
BASOPHILS ABSOLUTE: 0 K/UL (ref 0–0.2)
BASOPHILS RELATIVE PERCENT: 0.3 %
EOSINOPHILS ABSOLUTE: 0.1 K/UL (ref 0–0.6)
EOSINOPHILS RELATIVE PERCENT: 2.2 %
HCT VFR BLD CALC: 36.8 % (ref 36–48)
HEMOGLOBIN: 11.8 G/DL (ref 12–16)
LYMPHOCYTES ABSOLUTE: 2.6 K/UL (ref 1–5.1)
LYMPHOCYTES RELATIVE PERCENT: 39.7 %
MCH RBC QN AUTO: 27.9 PG (ref 26–34)
MCHC RBC AUTO-ENTMCNC: 32 G/DL (ref 31–36)
MCV RBC AUTO: 87.3 FL (ref 80–100)
MONOCYTES ABSOLUTE: 0.5 K/UL (ref 0–1.3)
MONOCYTES RELATIVE PERCENT: 7.1 %
NEUTROPHILS ABSOLUTE: 3.3 K/UL (ref 1.7–7.7)
NEUTROPHILS RELATIVE PERCENT: 50.7 %
PDW BLD-RTO: 14.5 % (ref 12.4–15.4)
PLATELET # BLD: 336 K/UL (ref 135–450)
PMV BLD AUTO: 8.3 FL (ref 5–10.5)
RBC # BLD: 4.21 M/UL (ref 4–5.2)
WBC # BLD: 6.6 K/UL (ref 4–11)

## 2019-10-30 PROCEDURE — 1090F PRES/ABSN URINE INCON ASSESS: CPT | Performed by: FAMILY MEDICINE

## 2019-10-30 PROCEDURE — 99214 OFFICE O/P EST MOD 30 MIN: CPT | Performed by: FAMILY MEDICINE

## 2019-10-30 PROCEDURE — 3044F HG A1C LEVEL LT 7.0%: CPT | Performed by: FAMILY MEDICINE

## 2019-10-30 PROCEDURE — G8417 CALC BMI ABV UP PARAM F/U: HCPCS | Performed by: FAMILY MEDICINE

## 2019-10-30 PROCEDURE — 1123F ACP DISCUSS/DSCN MKR DOCD: CPT | Performed by: FAMILY MEDICINE

## 2019-10-30 PROCEDURE — 4040F PNEUMOC VAC/ADMIN/RCVD: CPT | Performed by: FAMILY MEDICINE

## 2019-10-30 PROCEDURE — G8400 PT W/DXA NO RESULTS DOC: HCPCS | Performed by: FAMILY MEDICINE

## 2019-10-30 PROCEDURE — 90653 IIV ADJUVANT VACCINE IM: CPT | Performed by: FAMILY MEDICINE

## 2019-10-30 PROCEDURE — 2022F DILAT RTA XM EVC RTNOPTHY: CPT | Performed by: FAMILY MEDICINE

## 2019-10-30 PROCEDURE — G8482 FLU IMMUNIZE ORDER/ADMIN: HCPCS | Performed by: FAMILY MEDICINE

## 2019-10-30 PROCEDURE — G8598 ASA/ANTIPLAT THER USED: HCPCS | Performed by: FAMILY MEDICINE

## 2019-10-30 PROCEDURE — G0008 ADMIN INFLUENZA VIRUS VAC: HCPCS | Performed by: FAMILY MEDICINE

## 2019-10-30 PROCEDURE — 1036F TOBACCO NON-USER: CPT | Performed by: FAMILY MEDICINE

## 2019-10-30 PROCEDURE — 3017F COLORECTAL CA SCREEN DOC REV: CPT | Performed by: FAMILY MEDICINE

## 2019-10-30 PROCEDURE — G8427 DOCREV CUR MEDS BY ELIG CLIN: HCPCS | Performed by: FAMILY MEDICINE

## 2019-10-30 RX ORDER — FERROUS SULFATE 325(65) MG
325 TABLET ORAL
Qty: 90 TABLET | Refills: 1 | Status: SHIPPED | OUTPATIENT
Start: 2019-10-30 | End: 2020-05-04

## 2019-10-30 RX ORDER — OXYCODONE AND ACETAMINOPHEN 7.5; 325 MG/1; MG/1
1 TABLET ORAL 3 TIMES DAILY PRN
Qty: 90 TABLET | Refills: 0 | Status: SHIPPED | OUTPATIENT
Start: 2019-10-30 | End: 2020-02-04

## 2019-10-30 RX ORDER — LISINOPRIL AND HYDROCHLOROTHIAZIDE 12.5; 1 MG/1; MG/1
1 TABLET ORAL DAILY
Qty: 90 TABLET | Refills: 1 | Status: SHIPPED | OUTPATIENT
Start: 2019-10-30 | End: 2020-02-04

## 2019-10-30 RX ORDER — SERTRALINE HYDROCHLORIDE 100 MG/1
100 TABLET, FILM COATED ORAL DAILY
Qty: 90 TABLET | Refills: 1 | Status: SHIPPED | OUTPATIENT
Start: 2019-10-30 | End: 2019-11-11

## 2019-10-30 RX ORDER — OMEPRAZOLE 20 MG/1
20 CAPSULE, DELAYED RELEASE ORAL 2 TIMES DAILY
Qty: 180 CAPSULE | Refills: 1 | Status: SHIPPED | OUTPATIENT
Start: 2019-10-30 | End: 2020-08-25

## 2019-10-30 RX ORDER — PRAVASTATIN SODIUM 40 MG
40 TABLET ORAL NIGHTLY
Qty: 90 TABLET | Refills: 1 | Status: SHIPPED | OUTPATIENT
Start: 2019-10-30 | End: 2020-02-04

## 2019-10-30 RX ORDER — MIDODRINE HYDROCHLORIDE 5 MG/1
5 TABLET ORAL 2 TIMES DAILY WITH MEALS
Qty: 180 TABLET | Refills: 0 | Status: SHIPPED | OUTPATIENT
Start: 2019-10-30 | End: 2020-02-04

## 2019-10-30 RX ORDER — CELECOXIB 200 MG/1
400 CAPSULE ORAL DAILY
Qty: 180 CAPSULE | Refills: 1 | Status: SHIPPED | OUTPATIENT
Start: 2019-10-30 | End: 2020-05-04

## 2019-10-30 RX ORDER — AMITRIPTYLINE HYDROCHLORIDE 50 MG/1
TABLET, FILM COATED ORAL
Qty: 180 TABLET | Refills: 1 | Status: SHIPPED | OUTPATIENT
Start: 2019-10-30 | End: 2020-03-18 | Stop reason: SDUPTHER

## 2019-10-30 RX ORDER — ASPIRIN AND DIPYRIDAMOLE 25; 200 MG/1; MG/1
1 CAPSULE, EXTENDED RELEASE ORAL 2 TIMES DAILY
Qty: 180 CAPSULE | Refills: 1 | Status: SHIPPED | OUTPATIENT
Start: 2019-10-30 | End: 2020-02-04

## 2019-10-30 RX ORDER — GABAPENTIN 600 MG/1
600 TABLET ORAL 3 TIMES DAILY
Qty: 270 TABLET | Refills: 1 | Status: SHIPPED | OUTPATIENT
Start: 2019-10-30 | End: 2020-02-04

## 2019-10-30 ASSESSMENT — ENCOUNTER SYMPTOMS
CONSTIPATION: 0
COUGH: 0
SHORTNESS OF BREATH: 0
CHEST TIGHTNESS: 0
ABDOMINAL PAIN: 0
SORE THROAT: 0
DIARRHEA: 0
RHINORRHEA: 0
SINUS PRESSURE: 0

## 2019-10-31 LAB
ESTIMATED AVERAGE GLUCOSE: 125.5 MG/DL
HBA1C MFR BLD: 6 %

## 2019-10-31 RX ORDER — ALPRAZOLAM 0.5 MG/1
TABLET ORAL
Qty: 12 TABLET | Refills: 0 | Status: SHIPPED | OUTPATIENT
Start: 2019-10-31 | End: 2019-11-22 | Stop reason: SDUPTHER

## 2019-11-04 ENCOUNTER — TELEPHONE (OUTPATIENT)
Dept: FAMILY MEDICINE CLINIC | Age: 74
End: 2019-11-04

## 2019-11-06 ENCOUNTER — TELEPHONE (OUTPATIENT)
Dept: FAMILY MEDICINE CLINIC | Age: 74
End: 2019-11-06

## 2019-11-08 ENCOUNTER — TELEPHONE (OUTPATIENT)
Dept: FAMILY MEDICINE CLINIC | Age: 74
End: 2019-11-08

## 2019-11-11 RX ORDER — ESCITALOPRAM OXALATE 20 MG/1
20 TABLET ORAL DAILY
Qty: 30 TABLET | Refills: 3 | Status: SHIPPED | OUTPATIENT
Start: 2019-11-11 | End: 2020-02-04

## 2019-11-12 ENCOUNTER — TELEPHONE (OUTPATIENT)
Dept: FAMILY MEDICINE CLINIC | Age: 74
End: 2019-11-12

## 2019-11-19 DIAGNOSIS — F41.9 ANXIETY: Chronic | ICD-10-CM

## 2019-11-19 RX ORDER — ALPRAZOLAM 0.5 MG/1
TABLET ORAL
Qty: 12 TABLET | Refills: 0 | OUTPATIENT
Start: 2019-11-19 | End: 2019-12-19

## 2019-11-22 DIAGNOSIS — F41.9 ANXIETY: Chronic | ICD-10-CM

## 2019-11-23 RX ORDER — ALPRAZOLAM 0.5 MG/1
TABLET ORAL
Qty: 12 TABLET | Refills: 0 | Status: SHIPPED | OUTPATIENT
Start: 2019-11-23 | End: 2019-12-13 | Stop reason: SDUPTHER

## 2019-12-13 ENCOUNTER — OFFICE VISIT (OUTPATIENT)
Dept: FAMILY MEDICINE CLINIC | Age: 74
End: 2019-12-13
Payer: COMMERCIAL

## 2019-12-13 VITALS
TEMPERATURE: 98.4 F | HEIGHT: 62 IN | BODY MASS INDEX: 41.07 KG/M2 | OXYGEN SATURATION: 96 % | SYSTOLIC BLOOD PRESSURE: 108 MMHG | WEIGHT: 223.2 LBS | HEART RATE: 73 BPM | DIASTOLIC BLOOD PRESSURE: 78 MMHG

## 2019-12-13 DIAGNOSIS — R42 DIZZINESS: ICD-10-CM

## 2019-12-13 DIAGNOSIS — F41.9 ANXIETY: Chronic | ICD-10-CM

## 2019-12-13 DIAGNOSIS — I95.9 HYPOTENSION, UNSPECIFIED HYPOTENSION TYPE: ICD-10-CM

## 2019-12-13 DIAGNOSIS — S39.011A STRAIN OF ABDOMINAL MUSCLE, INITIAL ENCOUNTER: Primary | ICD-10-CM

## 2019-12-13 PROCEDURE — 3017F COLORECTAL CA SCREEN DOC REV: CPT | Performed by: NURSE PRACTITIONER

## 2019-12-13 PROCEDURE — G8400 PT W/DXA NO RESULTS DOC: HCPCS | Performed by: NURSE PRACTITIONER

## 2019-12-13 PROCEDURE — 1123F ACP DISCUSS/DSCN MKR DOCD: CPT | Performed by: NURSE PRACTITIONER

## 2019-12-13 PROCEDURE — G8482 FLU IMMUNIZE ORDER/ADMIN: HCPCS | Performed by: NURSE PRACTITIONER

## 2019-12-13 PROCEDURE — 1036F TOBACCO NON-USER: CPT | Performed by: NURSE PRACTITIONER

## 2019-12-13 PROCEDURE — 4040F PNEUMOC VAC/ADMIN/RCVD: CPT | Performed by: NURSE PRACTITIONER

## 2019-12-13 PROCEDURE — 1090F PRES/ABSN URINE INCON ASSESS: CPT | Performed by: NURSE PRACTITIONER

## 2019-12-13 PROCEDURE — G8427 DOCREV CUR MEDS BY ELIG CLIN: HCPCS | Performed by: NURSE PRACTITIONER

## 2019-12-13 PROCEDURE — 99213 OFFICE O/P EST LOW 20 MIN: CPT | Performed by: NURSE PRACTITIONER

## 2019-12-13 PROCEDURE — G8417 CALC BMI ABV UP PARAM F/U: HCPCS | Performed by: NURSE PRACTITIONER

## 2019-12-13 PROCEDURE — G8598 ASA/ANTIPLAT THER USED: HCPCS | Performed by: NURSE PRACTITIONER

## 2019-12-13 RX ORDER — TIZANIDINE 2 MG/1
2 TABLET ORAL 3 TIMES DAILY PRN
Qty: 21 TABLET | Refills: 0 | Status: SHIPPED | OUTPATIENT
Start: 2019-12-13 | End: 2019-12-20

## 2019-12-13 ASSESSMENT — ENCOUNTER SYMPTOMS
HEMATOCHEZIA: 0
VISUAL CHANGE: 0
COUGH: 0
NAUSEA: 0
SWOLLEN GLANDS: 0
CHANGE IN BOWEL HABIT: 0
SORE THROAT: 0
FLATUS: 0
SINUS PRESSURE: 0
CHEST TIGHTNESS: 0
ABDOMINAL PAIN: 1
BELCHING: 0
SINUS PAIN: 0
ABDOMINAL DISTENTION: 0
DIARRHEA: 0
VOMITING: 0
RHINORRHEA: 0
CONSTIPATION: 0
SHORTNESS OF BREATH: 0

## 2019-12-13 ASSESSMENT — CROHNS DISEASE ACTIVITY INDEX (CDAI): CDAI SCORE: 0

## 2019-12-17 RX ORDER — ALPRAZOLAM 0.5 MG/1
TABLET ORAL
Qty: 12 TABLET | Refills: 0 | Status: SHIPPED | OUTPATIENT
Start: 2019-12-17 | End: 2020-01-23 | Stop reason: SDUPTHER

## 2019-12-31 ENCOUNTER — TELEPHONE (OUTPATIENT)
Dept: FAMILY MEDICINE CLINIC | Age: 74
End: 2019-12-31

## 2020-01-23 NOTE — TELEPHONE ENCOUNTER
MED REFILL:    ALPRAZOLAM  0.5 MG  (30 CT)      PHARMACY:  [facility] [address]      PATIENT HAS 1 PILL LEFT ---COULD THIS PLEASE BE CALLED IN TODAY

## 2020-01-24 RX ORDER — ALPRAZOLAM 0.5 MG/1
TABLET ORAL
Qty: 15 TABLET | Refills: 0 | Status: SHIPPED | OUTPATIENT
Start: 2020-01-24 | End: 2020-02-04 | Stop reason: SDUPTHER

## 2020-02-04 ENCOUNTER — OFFICE VISIT (OUTPATIENT)
Dept: FAMILY MEDICINE CLINIC | Age: 75
End: 2020-02-04
Payer: COMMERCIAL

## 2020-02-04 ENCOUNTER — TELEPHONE (OUTPATIENT)
Dept: FAMILY MEDICINE CLINIC | Age: 75
End: 2020-02-04

## 2020-02-04 VITALS
WEIGHT: 220 LBS | SYSTOLIC BLOOD PRESSURE: 128 MMHG | BODY MASS INDEX: 40.48 KG/M2 | HEIGHT: 62 IN | DIASTOLIC BLOOD PRESSURE: 80 MMHG | OXYGEN SATURATION: 95 % | HEART RATE: 73 BPM

## 2020-02-04 DIAGNOSIS — I10 ESSENTIAL HYPERTENSION: ICD-10-CM

## 2020-02-04 DIAGNOSIS — N18.30 CKD (CHRONIC KIDNEY DISEASE) STAGE 3, GFR 30-59 ML/MIN (HCC): Chronic | ICD-10-CM

## 2020-02-04 DIAGNOSIS — D50.9 IRON DEFICIENCY ANEMIA, UNSPECIFIED IRON DEFICIENCY ANEMIA TYPE: Chronic | ICD-10-CM

## 2020-02-04 LAB
ANION GAP SERPL CALCULATED.3IONS-SCNC: 14 MMOL/L (ref 3–16)
BASOPHILS ABSOLUTE: 0 K/UL (ref 0–0.2)
BASOPHILS RELATIVE PERCENT: 0.6 %
BUN BLDV-MCNC: 19 MG/DL (ref 7–20)
CALCIUM SERPL-MCNC: 9.1 MG/DL (ref 8.3–10.6)
CHLORIDE BLD-SCNC: 106 MMOL/L (ref 99–110)
CO2: 24 MMOL/L (ref 21–32)
CREAT SERPL-MCNC: 0.8 MG/DL (ref 0.6–1.2)
EOSINOPHILS ABSOLUTE: 0.1 K/UL (ref 0–0.6)
EOSINOPHILS RELATIVE PERCENT: 2 %
GFR AFRICAN AMERICAN: >60
GFR NON-AFRICAN AMERICAN: >60
GLUCOSE BLD-MCNC: 101 MG/DL (ref 70–99)
HCT VFR BLD CALC: 37.9 % (ref 36–48)
HEMOGLOBIN: 12.3 G/DL (ref 12–16)
LYMPHOCYTES ABSOLUTE: 2 K/UL (ref 1–5.1)
LYMPHOCYTES RELATIVE PERCENT: 31.7 %
MCH RBC QN AUTO: 28 PG (ref 26–34)
MCHC RBC AUTO-ENTMCNC: 32.4 G/DL (ref 31–36)
MCV RBC AUTO: 86.3 FL (ref 80–100)
MONOCYTES ABSOLUTE: 0.4 K/UL (ref 0–1.3)
MONOCYTES RELATIVE PERCENT: 6.1 %
NEUTROPHILS ABSOLUTE: 3.8 K/UL (ref 1.7–7.7)
NEUTROPHILS RELATIVE PERCENT: 59.6 %
PDW BLD-RTO: 13.9 % (ref 12.4–15.4)
PLATELET # BLD: 342 K/UL (ref 135–450)
PMV BLD AUTO: 8.5 FL (ref 5–10.5)
POTASSIUM SERPL-SCNC: 3.8 MMOL/L (ref 3.5–5.1)
RBC # BLD: 4.39 M/UL (ref 4–5.2)
SODIUM BLD-SCNC: 144 MMOL/L (ref 136–145)
WBC # BLD: 6.3 K/UL (ref 4–11)

## 2020-02-04 PROCEDURE — 99214 OFFICE O/P EST MOD 30 MIN: CPT | Performed by: PHYSICIAN ASSISTANT

## 2020-02-04 RX ORDER — OXYCODONE AND ACETAMINOPHEN 7.5; 325 MG/1; MG/1
1 TABLET ORAL 3 TIMES DAILY PRN
COMMUNITY
End: 2021-09-29

## 2020-02-04 RX ORDER — ALPRAZOLAM 0.5 MG/1
TABLET ORAL
Qty: 30 TABLET | Refills: 0 | Status: SHIPPED | OUTPATIENT
Start: 2020-02-04 | End: 2020-04-03 | Stop reason: SDUPTHER

## 2020-02-04 ASSESSMENT — ENCOUNTER SYMPTOMS
SHORTNESS OF BREATH: 0
CHEST TIGHTNESS: 0
WHEEZING: 0
BACK PAIN: 1

## 2020-02-04 NOTE — PROGRESS NOTES
2/4/2020    [facility]    Chief Complaint   Patient presents with    Medication Refill     wanting month supply on xanax if possible. current script was 15.  Other     check up    Back Pain     lumbar pain/swelling. has been drinking cranberry juice. HPI  History was obtained from the patient. Tara Morrissey is a 76 y.o. female who presents today for routine office visit. She has history of anxiety and depression which is well controlled. There was a mixup with her Xanax and she has only been receiving 12 to 15 pills/month at a lower dose. Previously she had been getting 1 mg with a quantity of 45/month; the 0.5 was added on temporarily for grief due to loss of . She notes that she does do well on the 0.5 mg once nightly but because of not getting enough of a quantity she is having to take half pill nightly to make it last 1 month which is not effective. Otherwise her anxiety and depression is well controlled. She has history of hypertension which is well controlled. She is not currently on any antihypertensive medications. She has history of spinal stenosis which is being treated by Dr. Sonam Osei.  She does note some increased pain in her lower back over the past couple months and notes \"swelling\" over her lower back bilaterally, worse on the left. She has history of type 2 diabetes which is well controlled, last A1c was 3 months ago at 6.0. She has history of gastric reflux which is well controlled on the Prilosec. She has history of osteoarthritis of multiple joints which is well controlled with Celebrex as needed. She has history of chronic kidney disease and anemia which needs monitoring via blood work today. She denies any other questions or concerns. REVIEW OF SYMPTOMS    Review of Systems   Constitutional: Negative for activity change, appetite change and fatigue. Respiratory: Negative for chest tightness, shortness of breath and wheezing.     Cardiovascular: at night as needed 180 tablet 1    omeprazole (PRILOSEC) 20 MG delayed release capsule Take 1 capsule by mouth 2 times daily 180 capsule 1    ferrous sulfate 325 (65 Fe) MG tablet Take 1 tablet by mouth daily (with breakfast) 90 tablet 1    EQUATE STOOL SOFTENER 100 MG capsule TAKE 1 CAPSULE BY MOUTH ONCE DAILY 90 capsule 1    diphenhydrAMINE (BENADRYL) 25 MG tablet Take 25 mg by mouth nightly       acetaminophen (TYLENOL) 500 MG tablet Take 500 mg by mouth as needed for Pain      diclofenac sodium 1 % GEL Apply topically 3 times daily as needed 04/11/18 Applies to lower back area and  down       No current facility-administered medications for this visit. PHYSICAL EXAM    /80 (Site: Left Upper Arm, Position: Sitting, Cuff Size: Medium Adult)   Pulse 73   Ht 5' 2\" (1.575 m)   Wt 220 lb (99.8 kg)   SpO2 95%   BMI 40.24 kg/m²     Physical Exam  Vitals signs and nursing note reviewed. Constitutional:       Appearance: Normal appearance. HENT:      Head: Normocephalic and atraumatic. Cardiovascular:      Rate and Rhythm: Normal rate and regular rhythm. Heart sounds: Normal heart sounds. Pulmonary:      Effort: Pulmonary effort is normal.      Breath sounds: Normal breath sounds. Musculoskeletal:      Lumbar back: She exhibits tenderness and bony tenderness. Back:    Neurological:      Mental Status: She is alert. ASSESSMENT & PLAN    1. Essential hypertension  Well-controlled, monitor lab work  - Basic Metabolic Panel; Future  - CBC Auto Differential; Future    2. Anxiety  Since patient does well on the 0.5 mg tablets we will continue on that once daily as needed but increase the quantity to 30 to last her an entire month. - ALPRAZolam (XANAX) 0.5 MG tablet; 1 daily as needed for anxiety  Dispense: 30 tablet; Refill: 0    3. Moderate depressive disorder  Stable on her medications    4.  Spinal stenosis of lumbar region with neurogenic claudication  Continue to follow with Dr. Nicolas Engel.  Could consider acupuncture for increased pain but she has already tried and failed physical therapy on top of her medications from Dr. Nicolas Engel    5. Mass of skin of back  Difficult to characterize mass due to morbid obesity and fatty tissue. Discussed with Dr. Angie Arauz who recommends starting with an ultrasound to further evaluate and rule out benign lesion versus something that may need more investigation by general surgeon or biopsy.  -US SOFT TISSUE LIMITED AREA    6. Type 2 diabetes mellitus without complication, without long-term current use of insulin (HCC)  Currently well controlled. 7. Gastroesophageal reflux disease without esophagitis  Currently well controlled, continue on medication    8. Primary osteoarthritis involving multiple joints  Currently well controlled, continue on Celebrex as needed    9. CKD (chronic kidney disease) stage 3, GFR 30-59 ml/min ([facility]  Reevaluate kidney function  - Basic Metabolic Panel; Future  - CBC Auto Differential; Future    10. Iron deficiency anemia, unspecified iron deficiency anemia type  Reevaluate iron level. - CBC Auto Differential; Future      Periodic Controlled Substance Monitoring: No signs of potential drug abuse or diversion identified. Aleyda Da Silva PA-C)    Electronically signed by Aleyda aD Silva PA-C on 2/4/2020    Please note that this chart was generated using dragon dictation software. Although every effort was made to ensure the accuracy of this automated transcription, some errors in transcription may have occurred.

## 2020-02-06 ENCOUNTER — TELEPHONE (OUTPATIENT)
Dept: FAMILY MEDICINE CLINIC | Age: 75
End: 2020-02-06

## 2020-02-07 NOTE — TELEPHONE ENCOUNTER
It would be smarter for them to wear masks when around others who do not have the flu. Wash their hands regularly when they are sick or if they are afraid to make others sick.

## 2020-02-17 ENCOUNTER — HOSPITAL ENCOUNTER (OUTPATIENT)
Dept: ULTRASOUND IMAGING | Age: 75
Discharge: HOME | End: 2020-02-17
Payer: COMMERCIAL

## 2020-02-17 PROCEDURE — 76604 US EXAM CHEST: CPT

## 2020-02-18 ENCOUNTER — TELEPHONE (OUTPATIENT)
Dept: FAMILY MEDICINE CLINIC | Age: 75
End: 2020-02-18

## 2020-02-18 NOTE — TELEPHONE ENCOUNTER
Please call with results of US done yesterday. And also would like to have the Factor 5 test done. Granddaughter is a  Carrier.  Please give her info on this

## 2020-02-19 ENCOUNTER — TELEPHONE (OUTPATIENT)
Dept: FAMILY MEDICINE CLINIC | Age: 75
End: 2020-02-19

## 2020-02-19 NOTE — TELEPHONE ENCOUNTER
Lizbeth ng would like results of recent test and she would like info on Factor 5. Her granddaughter is a carrier and she is wanting to be tested for it.

## 2020-02-19 NOTE — TELEPHONE ENCOUNTER
Spoke with pt per dr Glenda Miller note.  Pt agreed & voiced understanding  SCHEDULED APPT 5/22/20 1:15PM WITH DR MORRIS [facility]

## 2020-03-09 NOTE — TELEPHONE ENCOUNTER
Met with patient today while he is here for Radiation Oncology consultation, reviewed my role gave him mind and my co-worker nurse navigators direct contact information.  Plan for patient to discuss surgery for March 25, patient does have follow-up in [place].  Patient will also need dental clearance prior to radiation therapy treatments.  Referral will be made for dietitian.   Returned call & spoke with pharmacist, infomed pt tests bid.  Confirmed bp cuff covered by insurance

## 2020-03-18 RX ORDER — AMITRIPTYLINE HYDROCHLORIDE 50 MG/1
TABLET, FILM COATED ORAL
Qty: 180 TABLET | Refills: 1 | Status: SHIPPED
Start: 2020-03-18 | End: 2020-05-04 | Stop reason: SDUPTHER

## 2020-04-03 RX ORDER — ALPRAZOLAM 0.5 MG/1
TABLET ORAL
Qty: 30 TABLET | Refills: 0 | Status: SHIPPED | OUTPATIENT
Start: 2020-04-03 | End: 2020-05-04 | Stop reason: SDUPTHER

## 2020-04-03 NOTE — TELEPHONE ENCOUNTER
PT TRIED A COUPLE OF HER DAUGHTERS BACLAFEN 10 MG FOR HER RLS AND IT SEEMS TO HELP. PT HAS A HX OF RLS.

## 2020-04-08 ENCOUNTER — TELEPHONE (OUTPATIENT)
Dept: FAMILY MEDICINE CLINIC | Age: 75
End: 2020-04-08

## 2020-04-08 NOTE — TELEPHONE ENCOUNTER
Sent you a note 4/3 for pt wanting baclafen for rls. Pt hasn't heard anything. Could you please see if she can and call her. thanks

## 2020-04-09 NOTE — TELEPHONE ENCOUNTER
Please educate patient. Baclofen is generally not recommended for people over 72years old. You should not be taking other peoples medicines as there are dangers to doing this and interactions with your own medications. Baclofen is a muscle relaxer. It is not indicated for restless legs. It has side effects and puts you at risk for confusion and increased falls. If you wish, I will prescribe the lowest dose to try. I would use it in the early evening if you wish to proceed after knowing about the medication.   (Baclofen 5 mg 1 every afternoon for restless legs #30 refill 2)

## 2020-05-04 ENCOUNTER — OFFICE VISIT (OUTPATIENT)
Dept: FAMILY MEDICINE CLINIC | Age: 75
End: 2020-05-04
Payer: COMMERCIAL

## 2020-05-04 VITALS
WEIGHT: 210.8 LBS | DIASTOLIC BLOOD PRESSURE: 78 MMHG | RESPIRATION RATE: 16 BRPM | BODY MASS INDEX: 38.79 KG/M2 | SYSTOLIC BLOOD PRESSURE: 128 MMHG | HEART RATE: 93 BPM | HEIGHT: 62 IN | OXYGEN SATURATION: 95 % | TEMPERATURE: 98.2 F

## 2020-05-04 PROCEDURE — 1036F TOBACCO NON-USER: CPT | Performed by: NURSE PRACTITIONER

## 2020-05-04 PROCEDURE — 1123F ACP DISCUSS/DSCN MKR DOCD: CPT | Performed by: NURSE PRACTITIONER

## 2020-05-04 PROCEDURE — 4040F PNEUMOC VAC/ADMIN/RCVD: CPT | Performed by: NURSE PRACTITIONER

## 2020-05-04 PROCEDURE — G8417 CALC BMI ABV UP PARAM F/U: HCPCS | Performed by: NURSE PRACTITIONER

## 2020-05-04 PROCEDURE — 3017F COLORECTAL CA SCREEN DOC REV: CPT | Performed by: NURSE PRACTITIONER

## 2020-05-04 PROCEDURE — 1090F PRES/ABSN URINE INCON ASSESS: CPT | Performed by: NURSE PRACTITIONER

## 2020-05-04 PROCEDURE — 99214 OFFICE O/P EST MOD 30 MIN: CPT | Performed by: NURSE PRACTITIONER

## 2020-05-04 PROCEDURE — G8400 PT W/DXA NO RESULTS DOC: HCPCS | Performed by: NURSE PRACTITIONER

## 2020-05-04 PROCEDURE — G8427 DOCREV CUR MEDS BY ELIG CLIN: HCPCS | Performed by: NURSE PRACTITIONER

## 2020-05-04 RX ORDER — SERTRALINE HYDROCHLORIDE 100 MG/1
100 TABLET, FILM COATED ORAL DAILY
COMMUNITY
End: 2020-11-02 | Stop reason: SDUPTHER

## 2020-05-04 RX ORDER — ALPRAZOLAM 0.5 MG/1
TABLET ORAL
Qty: 30 TABLET | Refills: 2 | Status: SHIPPED | OUTPATIENT
Start: 2020-05-04 | End: 2020-08-26

## 2020-05-04 RX ORDER — ROPINIROLE 0.5 MG/1
0.5 TABLET, FILM COATED ORAL 3 TIMES DAILY
Qty: 90 TABLET | Refills: 0 | Status: SHIPPED | OUTPATIENT
Start: 2020-05-04 | End: 2021-05-28 | Stop reason: SDUPTHER

## 2020-05-04 ASSESSMENT — ENCOUNTER SYMPTOMS
VOMITING: 0
ALLERGIC/IMMUNOLOGIC NEGATIVE: 1
BACK PAIN: 1
SHORTNESS OF BREATH: 0
NAUSEA: 0
ABDOMINAL DISTENTION: 0
EYES NEGATIVE: 1

## 2020-05-04 NOTE — PATIENT INSTRUCTIONS
moving your fingers and toes and then your hands and feet and then stretching and moving your entire body. Sometimes people fall asleep during relaxation, but they usually wake up shortly afterward. · Always give yourself time to return to full alertness before you drive a car or do anything that might cause an accident if you are not fully alert. Never play a relaxation tape while you drive a car. When should you call for help? Call 911 anytime you think you may need emergency care. For example, call if:    · You feel you cannot stop from hurting yourself or someone else.   Reji Hemp the numbers for these national suicide hotlines: 4-476-293-TALK (5-262.781.7948) and 4-787-VYWZIIL (9-913.855.9371). If you or someone you know talks about suicide or feeling hopeless, get help right away.   Watch closely for changes in your health, and be sure to contact your doctor if:    · You have anxiety or fear that affects your life.     · You have symptoms of anxiety that are new or different from those you had before. Where can you learn more? Go to https://GoTunes.mAPPn. org and sign in to your [employer] account. Enter P754 in the [employer] box to learn more about \"Anxiety Disorder: Care Instructions. \"     If you do not have an account, please click on the \"Sign Up Now\" link. Current as of: May 28, 2019Content Version: 12.4  © 7958-2444 [employer]. Care instructions adapted under license by [employer]. If you have questions about a medical condition or this instruction, always ask your healthcare professional. [address] any warranty or liability for your use of this information.

## 2020-05-04 NOTE — PROGRESS NOTES
SUBJECTIVE:  Sue Tang   1945   female   Allergies   Allergen Reactions    Latex        Chief Complaint   Patient presents with    Establish Care    Anxiety    Other     RLS        HPI   Here to establish new PCP, and formerly seen by Dr. Roseline Rivero. Reports RLS for > 1 year, typically at hs. Has not tried anything for it  Her grandtara works for [employer] and does her home health \"[facility] Waterman\"  History of anxiety, takes xanax most days of the week with good benefit;  passed last September. Now she has multiple family members living with her including her daughter, son in law and kids as well as another family, 3 dogs and a couple of cats. Walks with a cane; no falls  Sees Dr Patty Gallardo for pain management for chronic low back pain; history of scoliosis    Past Medical History:   Diagnosis Date    (HFpEF) heart failure with preserved ejection fraction (HCC)     Chronic anemia     Chronic pain     right hip and knee, pain management, Ligia Menjivar PA-C [place]    Essential hypertension     GERD (gastroesophageal reflux disease)     H/O percutaneous left heart catheterization 12/13/2016    False positive. No CAD    History of nuclear stress test 12/06/2016    lexiscan-mild ischemia mid inferior,EF70%    Hx of echocardiogram 06/26/2017    EF 55-60%. Grade 1 diastolic dysfunction.  Hyperhomocysteinemia (HCC)     Moderate depressive disorder     Morbid obesity (HCC)     Neuropathy     Normocytic anemia     Orthostasis     Osteoarthritis     Sleep apnea     Spinal stenosis of lumbar region      Social History     Socioeconomic History    Marital status:       Spouse name: Not on file    Number of children: 6    Years of education: Not on file    Highest education level: Not on file   Occupational History    Occupation: management     Comment: /retired   Social Needs    Financial resource strain: Not on file    Food insecurity     Worry: Pulmonary:      Effort: Pulmonary effort is normal.      Breath sounds: Normal breath sounds. Abdominal:      General: Bowel sounds are normal.      Palpations: Abdomen is soft. Musculoskeletal: Normal range of motion. Skin:     General: Skin is warm and dry. Capillary Refill: Capillary refill takes less than 2 seconds. Neurological:      Mental Status: She is alert and oriented to person, place, and time. Deep Tendon Reflexes: Reflexes are normal and symmetric. Psychiatric:         Mood and Affect: Mood normal.         Behavior: Behavior normal.         Thought Content: Thought content normal.         Judgment: Judgment normal.         ASSESSMENT/PLAN:    1. Screening for colon cancer  - POCT Fecal Immunochemical Test (FIT); Future    2. RLS (restless legs syndrome)  Drink 6-8 glasses of water daily  Stretch lower extremities prior to hs  Start:  - rOPINIRole (REQUIP) 0.5 MG tablet; Take 1 tablet by mouth 3 times daily  Dispense: 90 tablet; Refill: 0    3. Anxiety  Healthy diet, avoid caffeine  Increase routine exercise as tolerated  Refill:  - ALPRAZolam (XANAX) 0.5 MG tablet; 1 daily as needed for anxiety  Dispense: 30 tablet; Refill: 2    Controlled Substance Monitoring:    Acute and Chronic Pain Monitoring:   RX Monitoring 5/4/2020   Periodic Controlled Substance Monitoring Possible medication side effects, risk of tolerance/dependence & alternative treatments discussed. ;No signs of potential drug abuse or diversion identified. Chronic Pain > 50 MEDD Obtained or confirmed \"Consent for Opioid Use\" on file.          Orders Placed This Encounter   Procedures    POCT Fecal Immunochemical Test (FIT)     Standing Status:   Future     Standing Expiration Date:   5/4/2021     Current Outpatient Medications   Medication Sig Dispense Refill    sertraline (ZOLOFT) 100 MG tablet Take 100 mg by mouth daily      rOPINIRole (REQUIP) 0.5 MG tablet Take 1 tablet by mouth 3 times daily 90 tablet 0   

## 2020-05-11 ENCOUNTER — VIRTUAL VISIT (OUTPATIENT)
Dept: FAMILY MEDICINE CLINIC | Age: 75
End: 2020-05-11
Payer: COMMERCIAL

## 2020-05-11 PROCEDURE — 99441 PR PHYS/QHP TELEPHONE EVALUATION 5-10 MIN: CPT | Performed by: NURSE PRACTITIONER

## 2020-05-11 RX ORDER — AZITHROMYCIN 250 MG/1
250 TABLET, FILM COATED ORAL SEE ADMIN INSTRUCTIONS
Qty: 6 TABLET | Refills: 0 | Status: SHIPPED | OUTPATIENT
Start: 2020-05-11 | End: 2020-05-16

## 2020-05-11 RX ORDER — BENZONATATE 100 MG/1
100 CAPSULE ORAL 3 TIMES DAILY PRN
Qty: 30 CAPSULE | Refills: 0 | Status: SHIPPED | OUTPATIENT
Start: 2020-05-11 | End: 2020-05-21

## 2020-05-11 NOTE — PROGRESS NOTES
Kathi Lynch is a 76 y.o. female evaluated via telephone on 5/11/2020. Consent:  She and/or health care decision maker is aware that that she may receive a bill for this telephone service, depending on her insurance coverage, and has provided verbal consent to proceed: Yes      Documentation:  I communicated with the patient and/or health care decision maker about COUGH X 2 WEEKS. Details of this discussion including any medical advice provided:   Manpreet Rose reports a two week history of cough with green sputum. She denies fever or shortness of breath. Currently using coricidin and deselym cough medication with mild relief. Good appetite. Assessment    1. URI  zpak as directed  Tessalon perles as directed  Increase water intake to 6-8 glasses per day  Activity as tolerated  Return for new or worsening symptoms or any concerns as needed    I affirm this is a Patient Initiated Episode with a Patient who has not had a related appointment within my department in the past 7 days or scheduled within the next 24 hours.     Patient identification was verified at the start of the visit: Yes    Total Time: 7 minutes  Encounter inititiated: 10:00  Encounter completed: 10:07    Note: not billable if this call serves to triage the patient into an appointment for the relevant concern      Jenelle Munguia

## 2020-05-29 RX ORDER — ALPRAZOLAM 0.5 MG/1
TABLET ORAL
Qty: 30 TABLET | Refills: 2 | OUTPATIENT
Start: 2020-05-29 | End: 2020-06-27

## 2020-05-29 NOTE — TELEPHONE ENCOUNTER
Pharmacy has medication but pt can not fill until Sandra the 2nd.  Called pt and lm for her to call back

## 2020-07-27 RX ORDER — SERTRALINE HYDROCHLORIDE 100 MG/1
100 TABLET, FILM COATED ORAL DAILY
Qty: 30 TABLET | OUTPATIENT
Start: 2020-07-27

## 2020-08-25 RX ORDER — OMEPRAZOLE 20 MG/1
CAPSULE, DELAYED RELEASE ORAL
Qty: 60 CAPSULE | Refills: 0 | Status: SHIPPED | OUTPATIENT
Start: 2020-08-25 | End: 2020-11-02 | Stop reason: SDUPTHER

## 2020-08-26 RX ORDER — ALPRAZOLAM 0.5 MG/1
TABLET ORAL
Qty: 30 TABLET | Refills: 0 | Status: SHIPPED | OUTPATIENT
Start: 2020-08-26 | End: 2021-05-28

## 2020-08-26 NOTE — TELEPHONE ENCOUNTER
Patient will need to see another provider in the office for consideration of future refills of this medication.   Thank you

## 2020-09-28 ENCOUNTER — OFFICE VISIT (OUTPATIENT)
Dept: FAMILY MEDICINE CLINIC | Age: 75
End: 2020-09-28
Payer: MEDICARE

## 2020-09-28 VITALS
BODY MASS INDEX: 39.79 KG/M2 | OXYGEN SATURATION: 94 % | HEART RATE: 64 BPM | DIASTOLIC BLOOD PRESSURE: 80 MMHG | WEIGHT: 216.2 LBS | SYSTOLIC BLOOD PRESSURE: 128 MMHG | HEIGHT: 62 IN | TEMPERATURE: 97.3 F

## 2020-09-28 PROCEDURE — 81002 URINALYSIS NONAUTO W/O SCOPE: CPT | Performed by: PHYSICIAN ASSISTANT

## 2020-09-28 PROCEDURE — 3017F COLORECTAL CA SCREEN DOC REV: CPT | Performed by: PHYSICIAN ASSISTANT

## 2020-09-28 PROCEDURE — G8427 DOCREV CUR MEDS BY ELIG CLIN: HCPCS | Performed by: PHYSICIAN ASSISTANT

## 2020-09-28 PROCEDURE — 1090F PRES/ABSN URINE INCON ASSESS: CPT | Performed by: PHYSICIAN ASSISTANT

## 2020-09-28 PROCEDURE — 1123F ACP DISCUSS/DSCN MKR DOCD: CPT | Performed by: PHYSICIAN ASSISTANT

## 2020-09-28 PROCEDURE — G8400 PT W/DXA NO RESULTS DOC: HCPCS | Performed by: PHYSICIAN ASSISTANT

## 2020-09-28 PROCEDURE — G8417 CALC BMI ABV UP PARAM F/U: HCPCS | Performed by: PHYSICIAN ASSISTANT

## 2020-09-28 PROCEDURE — 99214 OFFICE O/P EST MOD 30 MIN: CPT | Performed by: PHYSICIAN ASSISTANT

## 2020-09-28 PROCEDURE — 1036F TOBACCO NON-USER: CPT | Performed by: PHYSICIAN ASSISTANT

## 2020-09-28 PROCEDURE — 4040F PNEUMOC VAC/ADMIN/RCVD: CPT | Performed by: PHYSICIAN ASSISTANT

## 2020-09-28 RX ORDER — BUSPIRONE HYDROCHLORIDE 5 MG/1
5 TABLET ORAL 2 TIMES DAILY
Qty: 60 TABLET | Refills: 0 | Status: SHIPPED | OUTPATIENT
Start: 2020-09-28 | End: 2020-10-28

## 2020-09-28 RX ORDER — NITROFURANTOIN 25; 75 MG/1; MG/1
100 CAPSULE ORAL 2 TIMES DAILY
Qty: 14 CAPSULE | Refills: 0 | Status: SHIPPED | OUTPATIENT
Start: 2020-09-28 | End: 2020-10-05

## 2020-09-28 ASSESSMENT — ENCOUNTER SYMPTOMS
VOMITING: 0
SHORTNESS OF BREATH: 0
SORE THROAT: 0
NAUSEA: 0
DIARRHEA: 0
RHINORRHEA: 0
COUGH: 0
EYE PAIN: 0
CONSTIPATION: 0
ABDOMINAL PAIN: 0

## 2020-11-02 ENCOUNTER — OFFICE VISIT (OUTPATIENT)
Dept: FAMILY MEDICINE CLINIC | Age: 75
End: 2020-11-02
Payer: MEDICARE

## 2020-11-02 VITALS
DIASTOLIC BLOOD PRESSURE: 82 MMHG | HEIGHT: 62 IN | HEART RATE: 60 BPM | SYSTOLIC BLOOD PRESSURE: 128 MMHG | BODY MASS INDEX: 40.96 KG/M2 | WEIGHT: 222.6 LBS | OXYGEN SATURATION: 98 % | TEMPERATURE: 96.8 F

## 2020-11-02 PROCEDURE — 1090F PRES/ABSN URINE INCON ASSESS: CPT | Performed by: PHYSICIAN ASSISTANT

## 2020-11-02 PROCEDURE — 90694 VACC AIIV4 NO PRSRV 0.5ML IM: CPT | Performed by: PHYSICIAN ASSISTANT

## 2020-11-02 PROCEDURE — 99214 OFFICE O/P EST MOD 30 MIN: CPT | Performed by: PHYSICIAN ASSISTANT

## 2020-11-02 PROCEDURE — 3017F COLORECTAL CA SCREEN DOC REV: CPT | Performed by: PHYSICIAN ASSISTANT

## 2020-11-02 PROCEDURE — 1123F ACP DISCUSS/DSCN MKR DOCD: CPT | Performed by: PHYSICIAN ASSISTANT

## 2020-11-02 PROCEDURE — G0008 ADMIN INFLUENZA VIRUS VAC: HCPCS | Performed by: PHYSICIAN ASSISTANT

## 2020-11-02 PROCEDURE — G8484 FLU IMMUNIZE NO ADMIN: HCPCS | Performed by: PHYSICIAN ASSISTANT

## 2020-11-02 PROCEDURE — 1036F TOBACCO NON-USER: CPT | Performed by: PHYSICIAN ASSISTANT

## 2020-11-02 PROCEDURE — 4040F PNEUMOC VAC/ADMIN/RCVD: CPT | Performed by: PHYSICIAN ASSISTANT

## 2020-11-02 PROCEDURE — G8427 DOCREV CUR MEDS BY ELIG CLIN: HCPCS | Performed by: PHYSICIAN ASSISTANT

## 2020-11-02 PROCEDURE — G8400 PT W/DXA NO RESULTS DOC: HCPCS | Performed by: PHYSICIAN ASSISTANT

## 2020-11-02 PROCEDURE — 2022F DILAT RTA XM EVC RTNOPTHY: CPT | Performed by: PHYSICIAN ASSISTANT

## 2020-11-02 PROCEDURE — 3046F HEMOGLOBIN A1C LEVEL >9.0%: CPT | Performed by: PHYSICIAN ASSISTANT

## 2020-11-02 PROCEDURE — G8417 CALC BMI ABV UP PARAM F/U: HCPCS | Performed by: PHYSICIAN ASSISTANT

## 2020-11-02 RX ORDER — BUSPIRONE HYDROCHLORIDE 5 MG/1
5 TABLET ORAL 3 TIMES DAILY
COMMUNITY
End: 2020-11-02 | Stop reason: SDUPTHER

## 2020-11-02 RX ORDER — BUSPIRONE HYDROCHLORIDE 5 MG/1
5 TABLET ORAL 2 TIMES DAILY
Qty: 180 TABLET | Refills: 1 | Status: SHIPPED | OUTPATIENT
Start: 2020-11-02 | End: 2021-05-01

## 2020-11-02 RX ORDER — CELECOXIB 200 MG/1
200 CAPSULE ORAL 2 TIMES DAILY
Qty: 60 CAPSULE | Refills: 1 | Status: SHIPPED | OUTPATIENT
Start: 2020-11-02 | End: 2020-11-30

## 2020-11-02 RX ORDER — OMEPRAZOLE 20 MG/1
CAPSULE, DELAYED RELEASE ORAL
Qty: 180 CAPSULE | Refills: 1 | Status: SHIPPED | OUTPATIENT
Start: 2020-11-02 | End: 2021-04-26

## 2020-11-02 RX ORDER — CELECOXIB 200 MG/1
200 CAPSULE ORAL 2 TIMES DAILY
COMMUNITY
End: 2020-11-02 | Stop reason: SDUPTHER

## 2020-11-02 RX ORDER — SERTRALINE HYDROCHLORIDE 100 MG/1
100 TABLET, FILM COATED ORAL DAILY
Qty: 30 TABLET | Refills: 2 | Status: SHIPPED | OUTPATIENT
Start: 2020-11-02 | End: 2021-01-26

## 2020-11-02 ASSESSMENT — ENCOUNTER SYMPTOMS
RHINORRHEA: 0
SORE THROAT: 0
COUGH: 0
SHORTNESS OF BREATH: 0

## 2020-11-02 NOTE — PROGRESS NOTES
11/2/2020    Mike Geiger    Chief Complaint   Patient presents with    Follow-up     2 months    Anxiety     pt reports feeling better       HPI  History obtained from the patient. Yodit East is a 76 y.o. female who presents today for anxiety follow up. Anxiety - The patient notes anxiety surrounding living in a home with a large family (9 people in one home), and they are facing eviction. She takes Zoloft 100 mg daily, as well as Xanax every other day. She was started on Buspar 5 mg BID at her last appointment 9/28/20. The patient reports feeling better since starting Buspar. She has noticed a significant improvement in her anxiety. Notes decreased irritability. Morbid Obesity & DM Type 2 - Patient notes significant weight loss and improvement in A1C since gastric bypass surgery. She no longer takes medication for diabetes. Last A1C on 10/30/2019 was 6.0%. Health Maintenance - Hep C screen, flu shot    REVIEW OF SYMPTOMS  Review of Systems   Constitutional: Negative for chills and fever. HENT: Negative for rhinorrhea and sore throat. Respiratory: Negative for cough and shortness of breath. PAST MEDICAL HISTORY  Past Medical History:   Diagnosis Date    (HFpEF) heart failure with preserved ejection fraction (HCC)     Chronic anemia     Chronic pain     right hip and knee, pain management, Ligia Menjivar PA-C [place]    Essential hypertension     GERD (gastroesophageal reflux disease)     H/O percutaneous left heart catheterization 12/13/2016    False positive. No CAD    History of nuclear stress test 12/06/2016    lexiscan-mild ischemia mid inferior,EF70%    Hx of echocardiogram 06/26/2017    EF 55-60%. Grade 1 diastolic dysfunction.     Hyperhomocysteinemia (HCC)     Moderate depressive disorder     Morbid obesity (HCC)     Neuropathy     Normocytic anemia     Orthostasis     Osteoarthritis     Sleep apnea     Spinal stenosis of lumbar region        FAMILY HISTORY  Family History   Problem Relation Age of Onset    Cancer Mother     High Blood Pressure Mother     Stroke Mother     Heart Disease Father     High Blood Pressure Father     Cancer Maternal Aunt     Diabetes type 2  Daughter     Coronary Art Dis Daughter     Obesity Daughter     COPD Daughter     No Known Problems Daughter     No Known Problems Son     No Known Problems Son     No Known Problems Son     No Known Problems Son     No Known Problems Son     No Known Problems Son        SOCIAL HISTORY  Social History     Socioeconomic History    Marital status:       Spouse name: Not on file    Number of children: 6    Years of education: Not on file    Highest education level: Not on file   Occupational History    Occupation: management     Comment: /retired   Social Needs    Financial resource strain: Not on file    Food insecurity     Worry: Not on file     Inability: Not on file   [employer] needs     Medical: Not on file     Non-medical: Not on file   Tobacco Use    Smoking status: Never Smoker    Smokeless tobacco: Never Used   Substance and Sexual Activity    Alcohol use: No    Drug use: No    Sexual activity: Not Currently     Partners: Male   Lifestyle    Physical activity     Days per week: Not on file     Minutes per session: Not on file    Stress: Not on file   Relationships    Social connections     Talks on phone: Not on file     Gets together: Not on file     Attends Synagogue service: Not on file     Active member of club or organization: Not on file     Attends meetings of clubs or organizations: Not on file     Relationship status: Not on file    Intimate partner violence     Fear of current or ex partner: Not on file     Emotionally abused: Not on file     Physically abused: Not on file     Forced sexual activity: Not on file   Other Topics Concern    Not on file   Social History Narrative    Not on file        SURGICAL ALLERGIES  Allergies   Allergen Reactions    Latex        RECENT LABS    Lab Results   Component Value Date    LABA1C 6.0 10/30/2019     Lab Results   Component Value Date    .5 10/30/2019       Lab Results   Component Value Date    CHOL 159 07/17/2019    CHOL 129 08/03/2018    CHOL 139 04/12/2018     Lab Results   Component Value Date    LDLCALC 64 07/17/2019       Lab Results   Component Value Date    WBC 6.3 02/04/2020    HGB 12.3 02/04/2020    HCT 37.9 02/04/2020    MCV 86.3 02/04/2020     02/04/2020       PHYSICAL EXAM  /82   Pulse 60   Temp 96.8 °F (36 °C)   Ht 5' 2\" (1.575 m)   Wt 222 lb 9.6 oz (101 kg)   SpO2 98%   BMI 40.71 kg/m²     Physical Exam  Constitutional:       Appearance: Normal appearance. HENT:      Head: Normocephalic and atraumatic. Eyes:      Comments: EOM grossly intact. Cardiovascular:      Rate and Rhythm: Normal rate and regular rhythm. Heart sounds: No murmur. No friction rub. No gallop. Pulmonary:      Effort: Pulmonary effort is normal.      Breath sounds: Normal breath sounds. No wheezing, rhonchi or rales. Skin:     General: Skin is warm and dry. Neurological:      Mental Status: She is alert and oriented to person, place, and time. Comments: Cranial nerves II-XII grossly intact   Psychiatric:         Mood and Affect: Mood normal.         Behavior: Behavior normal.         ASSESSMENT & PLAN  1. CHARLIE (generalized anxiety disorder)  Well controlled on current regimen. Refilled prescriptions. - busPIRone (BUSPAR) 5 MG tablet; Take 1 tablet by mouth 2 times daily  Dispense: 180 tablet; Refill: 1  - sertraline (ZOLOFT) 100 MG tablet; Take 1 tablet by mouth daily  Dispense: 30 tablet; Refill: 2    2. Overactive bladder  Refilled medication. - mirabegron (MYRBETRIQ) 25 MG TB24; Take 1 tablet by mouth daily  Dispense: 90 tablet; Refill: 1    3.  Type 2 diabetes mellitus without complication, without long-term current use of insulin [facility])  Patient has a history of Type II DM but her A1C has been under control without medication since a gastric bypass surgery. Will check A1C.   - Hemoglobin A1C; Future  - Comprehensive Metabolic Panel; Future  - CBC Auto Differential; Future    4. Dyslipidemia  Will update lipid panel.   - Lipid Panel; Future    5. Needs flu shot  Lakeisha VILLALOBOS, administered this today in the office.   - INFLUENZA, QUADV, ADJUVANTED, 65 YRS =, IM, PF, PREFILL SYR, 0.5ML (FLUAD)    6. Need for hepatitis C screening test  Health maintenance requirement.   - Hepatitis C Antibody; Future    7. Gastroesophageal reflux disease without esophagitis  Refilled medication. - omeprazole (PRILOSEC) 20 MG delayed release capsule; Take 1 capsule by mouth twice daily  Dispense: 180 capsule; Refill: 1    8. Spinal stenosis of lumbar region with neurogenic claudication  Refilled medication. - celecoxib (CELEBREX) 200 MG capsule; Take 1 capsule by mouth 2 times daily  Dispense: 60 capsule; Refill: 1    9. Morbid obesity with BMI of 40.0-44.9, adult [facility])  Patient has lost weight and gained control of her A1C without medication since gastric bypass surgery. No follow-ups on file.             Electronically signed by Todd Osorio PA-C on 11/2/2020

## 2020-11-03 PROBLEM — M19.90 OSTEOARTHRITIS: Status: RESOLVED | Noted: 2020-11-03 | Resolved: 2020-11-03

## 2020-11-30 RX ORDER — CELECOXIB 200 MG/1
CAPSULE ORAL
Qty: 180 CAPSULE | Refills: 0 | Status: SHIPPED | OUTPATIENT
Start: 2020-11-30 | End: 2021-05-28 | Stop reason: SDUPTHER

## 2020-11-30 NOTE — TELEPHONE ENCOUNTER
Requested Prescriptions     Signed Prescriptions Disp Refills    celecoxib (CELEBREX) 200 MG capsule 180 capsule 0     Sig: TAKE 1 CAPSULE BY MOUTH TWICE DAILY     Authorizing Provider: Carmen Villatoro     Ordering User: Brittany Ortega

## 2021-01-11 ENCOUNTER — TELEPHONE (OUTPATIENT)
Dept: FAMILY MEDICINE CLINIC | Age: 76
End: 2021-01-11

## 2021-01-11 NOTE — TELEPHONE ENCOUNTER
Spoke with pt scheduled f to f for xtra lrg bedside commode with [facility] 1/13/21 1:30pm

## 2021-01-14 ENCOUNTER — TELEPHONE (OUTPATIENT)
Dept: FAMILY MEDICINE CLINIC | Age: 76
End: 2021-01-14

## 2021-01-14 NOTE — TELEPHONE ENCOUNTER
PT MISSED HER APPT YESTERDAY BECAUSE HER DOG PASSED AWAY.  CAN YOU CALL JUST A COUPLE OF NERVE PILLS IN TO HELP HER PAST  THIS

## 2021-01-15 RX ORDER — HYDROXYZINE HYDROCHLORIDE 25 MG/1
TABLET, FILM COATED ORAL
Qty: 20 TABLET | Refills: 1 | Status: SHIPPED | OUTPATIENT
Start: 2021-01-15 | End: 2021-05-28 | Stop reason: SDUPTHER

## 2021-01-15 NOTE — TELEPHONE ENCOUNTER
Requested Prescriptions     Signed Prescriptions Disp Refills    hydrOXYzine (ATARAX) 25 MG tablet 20 tablet 1     Sig: Take 1 tablet by mouth twice daily as needed for anxiety     Authorizing Provider: Zachariah Gomez     Ordering User: Shirley Shaffer

## 2021-01-22 ENCOUNTER — OFFICE VISIT (OUTPATIENT)
Dept: FAMILY MEDICINE CLINIC | Age: 76
End: 2021-01-22
Payer: MEDICARE

## 2021-01-22 ENCOUNTER — TELEPHONE (OUTPATIENT)
Dept: FAMILY MEDICINE CLINIC | Age: 76
End: 2021-01-22

## 2021-01-22 VITALS
HEIGHT: 62 IN | OXYGEN SATURATION: 96 % | WEIGHT: 220.6 LBS | BODY MASS INDEX: 40.59 KG/M2 | HEART RATE: 53 BPM | SYSTOLIC BLOOD PRESSURE: 112 MMHG | DIASTOLIC BLOOD PRESSURE: 72 MMHG | TEMPERATURE: 97.7 F

## 2021-01-22 DIAGNOSIS — N32.81 OVERACTIVE BLADDER: ICD-10-CM

## 2021-01-22 DIAGNOSIS — K52.9 CHRONIC DIARRHEA: Primary | ICD-10-CM

## 2021-01-22 PROCEDURE — 1036F TOBACCO NON-USER: CPT | Performed by: PHYSICIAN ASSISTANT

## 2021-01-22 PROCEDURE — G8417 CALC BMI ABV UP PARAM F/U: HCPCS | Performed by: PHYSICIAN ASSISTANT

## 2021-01-22 PROCEDURE — 1090F PRES/ABSN URINE INCON ASSESS: CPT | Performed by: PHYSICIAN ASSISTANT

## 2021-01-22 PROCEDURE — 1123F ACP DISCUSS/DSCN MKR DOCD: CPT | Performed by: PHYSICIAN ASSISTANT

## 2021-01-22 PROCEDURE — 99214 OFFICE O/P EST MOD 30 MIN: CPT | Performed by: PHYSICIAN ASSISTANT

## 2021-01-22 PROCEDURE — G8400 PT W/DXA NO RESULTS DOC: HCPCS | Performed by: PHYSICIAN ASSISTANT

## 2021-01-22 PROCEDURE — G8484 FLU IMMUNIZE NO ADMIN: HCPCS | Performed by: PHYSICIAN ASSISTANT

## 2021-01-22 PROCEDURE — G8427 DOCREV CUR MEDS BY ELIG CLIN: HCPCS | Performed by: PHYSICIAN ASSISTANT

## 2021-01-22 PROCEDURE — 4040F PNEUMOC VAC/ADMIN/RCVD: CPT | Performed by: PHYSICIAN ASSISTANT

## 2021-01-22 PROCEDURE — 3017F COLORECTAL CA SCREEN DOC REV: CPT | Performed by: PHYSICIAN ASSISTANT

## 2021-01-22 RX ORDER — LISINOPRIL 10 MG/1
10 TABLET ORAL DAILY
COMMUNITY
End: 2021-05-28

## 2021-01-22 RX ORDER — LISINOPRIL AND HYDROCHLOROTHIAZIDE 12.5; 1 MG/1; MG/1
TABLET ORAL
Qty: 90 TABLET | Refills: 0 | Status: SHIPPED | OUTPATIENT
Start: 2021-01-22 | End: 2021-05-28

## 2021-01-22 RX ORDER — MONTELUKAST SODIUM 4 MG/1
2 TABLET, CHEWABLE ORAL DAILY PRN
Qty: 60 TABLET | Refills: 3 | Status: SHIPPED | OUTPATIENT
Start: 2021-01-22 | End: 2021-07-07

## 2021-01-22 NOTE — TELEPHONE ENCOUNTER
Please call the patient and let her know what [facility] received our order for an extra large commode and told us that they can only fill a standard commode size because the patient must weigh 301 lbs or more to qualify for and extra large commode. Could you please also call [facility] back and let them know that a standard size is fine? Thank you!   Aaron Frias

## 2021-01-22 NOTE — TELEPHONE ENCOUNTER
Called pt to let her know and she voiced understanding .  I also called [facility] and gave them the info as well

## 2021-01-22 NOTE — TELEPHONE ENCOUNTER
Requested Prescriptions     Signed Prescriptions Disp Refills    lisinopril-hydroCHLOROthiazide (PRINZIDE;ZESTORETIC) 10-12.5 MG per tablet 90 tablet 0     Sig: Take 1 tablet by mouth once daily     Authorizing Provider: Delta Patton     Ordering User: John Quiles

## 2021-01-22 NOTE — PROGRESS NOTES
1/22/2021    Lisandro Brunson    Chief Complaint   Patient presents with    Orders     pt here for a face to face for a extra large bedside commode .  Other     pt states that she went back on her lisinopril       HPI  History obtained from the patient. Diane Smith is a 76 y.o. female who presents today for face to face for bedside commode. She currently has a bedside commode which she's been using since she had her right knee replaced, but it's so old that that it's \"falling apart. \" She notes that her bedroom if on the first level of her home and the bathroom is upstairs on the second level, and she is often unable to make it to the bathroom on time. She patient explains that she has chronic diarrhea, which increases her frequency and urgency with bathroom trips. She does admit history of cholecystectomy a very long time ago. REVIEW OF SYMPTOMS  Review of Systems   Constitutional: Negative for chills and fever. Respiratory: Negative for cough and shortness of breath. Gastrointestinal: Negative for diarrhea and vomiting. PAST MEDICAL HISTORY  Past Medical History:   Diagnosis Date    (HFpEF) heart failure with preserved ejection fraction (HCC)     Chronic anemia     Chronic pain     right hip and knee, pain management, Ligia Menjivar PA-C [place]    Essential hypertension     GERD (gastroesophageal reflux disease)     H/O percutaneous left heart catheterization 12/13/2016    False positive. No CAD    History of nuclear stress test 12/06/2016    lexiscan-mild ischemia mid inferior,EF70%    Hx of echocardiogram 06/26/2017    EF 55-60%. Grade 1 diastolic dysfunction.     Hyperhomocysteinemia (HCC)     Moderate depressive disorder     Morbid obesity (HCC)     Neuropathy     Normocytic anemia     Orthostasis     Osteoarthritis     Sleep apnea     Spinal stenosis of lumbar region        FAMILY HISTORY  Family History   Problem Relation Age of Onset    Cancer Mother  High Blood Pressure Mother     Stroke Mother     Heart Disease Father     High Blood Pressure Father     Cancer Maternal Aunt     Diabetes type 2  Daughter     Coronary Art Dis Daughter     Obesity Daughter     COPD Daughter     No Known Problems Daughter     No Known Problems Son     No Known Problems Son     No Known Problems Son     No Known Problems Son     No Known Problems Son     No Known Problems Son        SOCIAL HISTORY  Social History     Socioeconomic History    Marital status:       Spouse name: None    Number of children: 6    Years of education: None    Highest education level: None   Occupational History    Occupation: management     Comment: /retired   Social Needs    Financial resource strain: None    Food insecurity     Worry: None     Inability: None    Transportation needs     Medical: None     Non-medical: None   Tobacco Use    Smoking status: Never Smoker    Smokeless tobacco: Never Used   Substance and Sexual Activity    Alcohol use: No    Drug use: No    Sexual activity: Not Currently     Partners: Male   Lifestyle    Physical activity     Days per week: None     Minutes per session: None    Stress: None   Relationships    Social connections     Talks on phone: None     Gets together: None     Attends Restorationist service: None     Active member of club or organization: None     Attends meetings of clubs or organizations: None     Relationship status: None    Intimate partner violence     Fear of current or ex partner: None     Emotionally abused: None     Physically abused: None     Forced sexual activity: None   Other Topics Concern    None   Social History Narrative    None        SURGICAL HISTORY  Past Surgical History:   Procedure Laterality Date    CATARACT REMOVAL  2008    CHOLECYSTECTOMY      EYE SURGERY      cataracts    GASTRIC BYPASS SURGERY  2010    HYSTERECTOMY      [address]INT REPLACEMENT Left 09/17/2018    [id number]  JOINT REPLACEMENT Right 2003    Dr. Evelina Mccormack  2006    total lt shoulder     SHOULDER SURGERY  2006    total rt shoulder    TOTAL KNEE ARTHROPLASTY Right     TOTAL KNEE ARTHROPLASTY  02/2011    left       CURRENT MEDICATIONS  Current Outpatient Medications   Medication Sig Dispense Refill    lisinopril (PRINIVIL;ZESTRIL) 10 MG tablet Take 10 mg by mouth daily      mirabegron (MYRBETRIQ) 25 MG TB24 Take 1 tablet by mouth daily 90 tablet 1    colestipol (COLESTID) 1 g tablet Take 2 tablets by mouth daily as needed (diarrhea) 60 tablet 3    hydrOXYzine (ATARAX) 25 MG tablet Take 1 tablet by mouth twice daily as needed for anxiety 20 tablet 1    celecoxib (CELEBREX) 200 MG capsule TAKE 1 CAPSULE BY MOUTH TWICE DAILY 180 capsule 0    omeprazole (PRILOSEC) 20 MG delayed release capsule Take 1 capsule by mouth twice daily 180 capsule 1    busPIRone (BUSPAR) 5 MG tablet Take 1 tablet by mouth 2 times daily 180 tablet 1    sertraline (ZOLOFT) 100 MG tablet Take 1 tablet by mouth daily 30 tablet 2    rOPINIRole (REQUIP) 0.5 MG tablet Take 1 tablet by mouth 3 times daily 90 tablet 0    amitriptyline (ELAVIL) 25 MG tablet TAKE 1 TABLET BY MOUTH EVERY DAY AT BEDTIME 90 tablet 0    oxyCODONE-acetaminophen (PERCOCET) 7.5-325 MG per tablet Take 1 tablet by mouth 3 times daily as needed for Pain.       diphenhydrAMINE (BENADRYL) 25 MG tablet Take 25 mg by mouth nightly       acetaminophen (TYLENOL) 500 MG tablet Take 500 mg by mouth as needed for Pain      ALPRAZolam (XANAX) 0.5 MG tablet TAKE 1 TABLET BY MOUTH ONCE DAILY AS NEEDED FOR ANXIETY (Patient not taking: Reported on 9/28/2020) 30 tablet 0    EQUATE STOOL SOFTENER 100 MG capsule TAKE 1 CAPSULE BY MOUTH ONCE DAILY (Patient not taking: Reported on 9/28/2020) 90 capsule 1    diclofenac sodium 1 % GEL Apply topically 3 times daily as needed 04/11/18 Applies to lower back area and  down - mirabegron (MYRBETRIQ) 25 MG TB24; Take 1 tablet by mouth daily  Dispense: 90 tablet; Refill: 1          No follow-ups on file.             Electronically signed by Dariana Estevez PA-C on 1/22/2021

## 2021-01-29 ENCOUNTER — TELEPHONE (OUTPATIENT)
Dept: FAMILY MEDICINE CLINIC | Age: 76
End: 2021-01-29

## 2021-02-01 NOTE — TELEPHONE ENCOUNTER
Returned call and spoke with Mike Painter.  Confirmed order for 1st floor bathroom and ramp received

## 2021-04-26 DIAGNOSIS — K21.9 GASTROESOPHAGEAL REFLUX DISEASE WITHOUT ESOPHAGITIS: ICD-10-CM

## 2021-04-26 RX ORDER — OMEPRAZOLE 20 MG/1
CAPSULE, DELAYED RELEASE ORAL
Qty: 180 CAPSULE | Refills: 0 | Status: SHIPPED | OUTPATIENT
Start: 2021-04-26 | End: 2021-07-26

## 2021-05-28 ENCOUNTER — OFFICE VISIT (OUTPATIENT)
Dept: FAMILY MEDICINE CLINIC | Age: 76
End: 2021-05-28
Payer: MEDICARE

## 2021-05-28 VITALS
HEIGHT: 62 IN | WEIGHT: 224.4 LBS | OXYGEN SATURATION: 97 % | DIASTOLIC BLOOD PRESSURE: 78 MMHG | BODY MASS INDEX: 41.3 KG/M2 | HEART RATE: 62 BPM | SYSTOLIC BLOOD PRESSURE: 128 MMHG

## 2021-05-28 DIAGNOSIS — Z83.2 FAMILY HISTORY OF FACTOR V DEFICIENCY: ICD-10-CM

## 2021-05-28 DIAGNOSIS — G25.81 RLS (RESTLESS LEGS SYNDROME): ICD-10-CM

## 2021-05-28 DIAGNOSIS — M48.062 SPINAL STENOSIS OF LUMBAR REGION WITH NEUROGENIC CLAUDICATION: ICD-10-CM

## 2021-05-28 DIAGNOSIS — F41.1 GAD (GENERALIZED ANXIETY DISORDER): Primary | ICD-10-CM

## 2021-05-28 DIAGNOSIS — E66.01 MORBID OBESITY WITH BMI OF 40.0-44.9, ADULT (HCC): ICD-10-CM

## 2021-05-28 DIAGNOSIS — N32.81 OVERACTIVE BLADDER: ICD-10-CM

## 2021-05-28 DIAGNOSIS — E11.9 TYPE 2 DIABETES MELLITUS WITHOUT COMPLICATION, WITHOUT LONG-TERM CURRENT USE OF INSULIN (HCC): ICD-10-CM

## 2021-05-28 PROCEDURE — G8417 CALC BMI ABV UP PARAM F/U: HCPCS | Performed by: PHYSICIAN ASSISTANT

## 2021-05-28 PROCEDURE — 99214 OFFICE O/P EST MOD 30 MIN: CPT | Performed by: PHYSICIAN ASSISTANT

## 2021-05-28 PROCEDURE — 1090F PRES/ABSN URINE INCON ASSESS: CPT | Performed by: PHYSICIAN ASSISTANT

## 2021-05-28 PROCEDURE — 4040F PNEUMOC VAC/ADMIN/RCVD: CPT | Performed by: PHYSICIAN ASSISTANT

## 2021-05-28 PROCEDURE — 1123F ACP DISCUSS/DSCN MKR DOCD: CPT | Performed by: PHYSICIAN ASSISTANT

## 2021-05-28 PROCEDURE — G8400 PT W/DXA NO RESULTS DOC: HCPCS | Performed by: PHYSICIAN ASSISTANT

## 2021-05-28 PROCEDURE — G8427 DOCREV CUR MEDS BY ELIG CLIN: HCPCS | Performed by: PHYSICIAN ASSISTANT

## 2021-05-28 PROCEDURE — 1036F TOBACCO NON-USER: CPT | Performed by: PHYSICIAN ASSISTANT

## 2021-05-28 RX ORDER — SERTRALINE HYDROCHLORIDE 100 MG/1
100 TABLET, FILM COATED ORAL DAILY
Qty: 90 TABLET | Refills: 1 | Status: ON HOLD | OUTPATIENT
Start: 2021-05-28 | End: 2021-10-01 | Stop reason: SDUPTHER

## 2021-05-28 RX ORDER — CELECOXIB 200 MG/1
CAPSULE ORAL
Qty: 180 CAPSULE | Refills: 0 | Status: SHIPPED | OUTPATIENT
Start: 2021-05-28 | End: 2021-08-27

## 2021-05-28 RX ORDER — ROPINIROLE 0.5 MG/1
0.5 TABLET, FILM COATED ORAL 3 TIMES DAILY
Qty: 270 TABLET | Refills: 1 | Status: SHIPPED | OUTPATIENT
Start: 2021-05-28 | End: 2021-09-29

## 2021-05-28 RX ORDER — HYDROXYZINE HYDROCHLORIDE 25 MG/1
TABLET, FILM COATED ORAL
Qty: 60 TABLET | Refills: 2 | Status: SHIPPED | OUTPATIENT
Start: 2021-05-28 | End: 2021-09-29 | Stop reason: SDUPTHER

## 2021-05-28 NOTE — PROGRESS NOTES
[place]    Essential hypertension     GERD (gastroesophageal reflux disease)     H/O percutaneous left heart catheterization 12/13/2016    False positive. No CAD    History of nuclear stress test 12/06/2016    lexiscan-mild ischemia mid inferior,EF70%    Hx of echocardiogram 06/26/2017    EF 55-60%. Grade 1 diastolic dysfunction.  Hyperhomocysteinemia (HCC)     Moderate depressive disorder     Morbid obesity (HCC)     Neuropathy     Normocytic anemia     Orthostasis     Osteoarthritis     Sleep apnea     Spinal stenosis of lumbar region        FAMILY HISTORY  Family History   Problem Relation Age of Onset    Cancer Mother     High Blood Pressure Mother     Stroke Mother     Heart Disease Father     High Blood Pressure Father     Cancer Maternal Aunt     Diabetes type 2  Daughter     Coronary Art Dis Daughter     Obesity Daughter     COPD Daughter     No Known Problems Daughter     No Known Problems Son     No Known Problems Son     No Known Problems Son     No Known Problems Son     No Known Problems Son     No Known Problems Son        SOCIAL HISTORY  Social History     Socioeconomic History    Marital status:       Spouse name: Not on file    Number of children: 6    Years of education: Not on file    Highest education level: Not on file   Occupational History    Occupation: management     Comment: /retired   Tobacco Use    Smoking status: Never Smoker    Smokeless tobacco: Never Used   Vaping Use    Vaping Use: Never used   Substance and Sexual Activity    Alcohol use: No    Drug use: No    Sexual activity: Not Currently     Partners: Male   Other Topics Concern    Not on file   Social History Narrative    Not on file     Social Determinants of Health     Financial Resource Strain:     Difficulty of Paying Living Expenses:    Food Insecurity:     Worried About Running Out of Food in the Last Year:     [address] in the Last Year: Transportation Needs:     Lack of Transportation (Medical):      Lack of Transportation (Non-Medical):    Physical Activity:     Days of Exercise per Week:     Minutes of Exercise per Session:    Stress:     Feeling of Stress :    Social Connections:     Frequency of Communication with Friends and Family:     Frequency of Social Gatherings with Friends and Family:     Attends Oriental orthodox Services:     Active Member of Clubs or Organizations:     Attends Club or Organization Meetings:     Marital Status:    Intimate Partner Violence:     Fear of Current or Ex-Partner:     Emotionally Abused:     Physically Abused:     Sexually Abused:         SURGICAL HISTORY  Past Surgical History:   Procedure Laterality Date    CATARACT REMOVAL  2008    CHOLECYSTECTOMY      EYE SURGERY      cataracts    GASTRIC BYPASS SURGERY  2010    HYSTERECTOMY      JOINT REPLACEMENT Left 09/17/2018    [facility]    JOINT REPLACEMENT Right 2003    Dr. Yamileth Mcclellan  2006    total lt shoulder     SHOULDER SURGERY  2006    total rt shoulder    TOTAL KNEE ARTHROPLASTY Right     TOTAL KNEE ARTHROPLASTY  02/2011    left       CURRENT MEDICATIONS  Current Outpatient Medications   Medication Sig Dispense Refill    celecoxib (CELEBREX) 200 MG capsule TAKE 1 CAPSULE BY MOUTH TWICE DAILY 180 capsule 0    hydrOXYzine (ATARAX) 25 MG tablet Take 1 tablet by mouth twice daily as needed for anxiety 60 tablet 2    rOPINIRole (REQUIP) 0.5 MG tablet Take 1 tablet by mouth 3 times daily 270 tablet 1    sertraline (ZOLOFT) 100 MG tablet Take 1 tablet by mouth daily 90 tablet 1    omeprazole (PRILOSEC) 20 MG delayed release capsule TAKE 1 CAPSULE BY MOUTH TWICE DAILY 180 capsule 0    mirabegron (MYRBETRIQ) 25 MG TB24 Take 1 tablet by mouth daily 90 tablet 1    amitriptyline (ELAVIL) 25 MG tablet TAKE 1 TABLET BY MOUTH EVERY DAY AT BEDTIME 90 tablet 0    oxyCODONE-acetaminophen (PERCOCET) 7.5-325 MG per tablet Take 1 tablet by mouth 3 times daily as needed for Pain.  diphenhydrAMINE (BENADRYL) 25 MG tablet Take 25 mg by mouth nightly       acetaminophen (TYLENOL) 500 MG tablet Take 500 mg by mouth as needed for Pain      colestipol (COLESTID) 1 g tablet Take 2 tablets by mouth daily as needed (diarrhea) 60 tablet 3     No current facility-administered medications for this visit. ALLERGIES  Allergies   Allergen Reactions    Latex        RECENT LABS    Lab Results   Component Value Date    LABA1C 6.0 10/30/2019     Lab Results   Component Value Date    .5 10/30/2019       Lab Results   Component Value Date    CHOL 159 07/17/2019    CHOL 129 08/03/2018    CHOL 139 04/12/2018     Lab Results   Component Value Date    LDLCALC 64 07/17/2019       Lab Results   Component Value Date    WBC 6.3 02/04/2020    HGB 12.3 02/04/2020    HCT 37.9 02/04/2020    MCV 86.3 02/04/2020     02/04/2020       PHYSICAL EXAM  /78   Pulse 62   Ht 5' 2\" (1.575 m)   Wt 224 lb 6.4 oz (101.8 kg)   SpO2 97%   BMI 41.04 kg/m²     Physical Exam  Constitutional:       Appearance: Normal appearance. HENT:      Head: Normocephalic and atraumatic. Eyes:      Comments: EOM grossly intact. Cardiovascular:      Rate and Rhythm: Normal rate and regular rhythm. Heart sounds: No murmur heard. No friction rub. No gallop. Pulmonary:      Effort: Pulmonary effort is normal.      Breath sounds: Normal breath sounds. No wheezing, rhonchi or rales. Skin:     General: Skin is warm and dry. Neurological:      Mental Status: She is alert and oriented to person, place, and time. Comments: Cranial nerves II-XII grossly intact   Psychiatric:         Mood and Affect: Mood normal.         Behavior: Behavior normal.         ASSESSMENT & PLAN  1. Type 2 diabetes mellitus without complication, without long-term current use of insulin ([address]  Patient is overdue for hemoglobin A1c.   I instructed the patient to come in for fasting blood work at her earliest convenience to get this done. 2. CHARLIE (generalized anxiety disorder)  Well-controlled on current regimen. Refilled medications. - hydrOXYzine (ATARAX) 25 MG tablet; Take 1 tablet by mouth twice daily as needed for anxiety  Dispense: 60 tablet; Refill: 2  - sertraline (ZOLOFT) 100 MG tablet; Take 1 tablet by mouth daily  Dispense: 90 tablet; Refill: 1    3. RLS (restless legs syndrome)  Well-controlled. Refilled medications. - rOPINIRole (REQUIP) 0.5 MG tablet; Take 1 tablet by mouth 3 times daily  Dispense: 270 tablet; Refill: 1    4. Spinal stenosis of lumbar region with neurogenic claudication  Pain is controlled. Refilled medication. - celecoxib (CELEBREX) 200 MG capsule; TAKE 1 CAPSULE BY MOUTH TWICE DAILY  Dispense: 180 capsule; Refill: 0    5. Family history of factor V deficiency  Discussed testing the patient for this, but she denies any history of blood clotting issues and does not think it necessary to be screened. 6. Morbid obesity with BMI of 40.0-44.9, adult ([facility] [address]  We will check labs including A1c and advise based on findings. 7. Overactive bladder  Well-controlled. Patient does not need refills at this time. Return in about 6 months (around 11/28/2021).             Electronically signed by Yohannes Chandler PA-C on 5/28/2021

## 2021-06-08 DIAGNOSIS — E11.9 TYPE 2 DIABETES MELLITUS WITHOUT COMPLICATION, WITHOUT LONG-TERM CURRENT USE OF INSULIN (HCC): ICD-10-CM

## 2021-06-08 DIAGNOSIS — E78.5 DYSLIPIDEMIA: ICD-10-CM

## 2021-06-08 DIAGNOSIS — Z11.59 NEED FOR HEPATITIS C SCREENING TEST: ICD-10-CM

## 2021-06-08 LAB
A/G RATIO: 1.7 (ref 1.1–2.2)
ALBUMIN SERPL-MCNC: 4.3 G/DL (ref 3.4–5)
ALP BLD-CCNC: 115 U/L (ref 40–129)
ALT SERPL-CCNC: 10 U/L (ref 10–40)
ANION GAP SERPL CALCULATED.3IONS-SCNC: 12 MMOL/L (ref 3–16)
AST SERPL-CCNC: 17 U/L (ref 15–37)
BASOPHILS ABSOLUTE: 0 K/UL (ref 0–0.2)
BASOPHILS RELATIVE PERCENT: 0.6 %
BILIRUB SERPL-MCNC: 0.3 MG/DL (ref 0–1)
BUN BLDV-MCNC: 15 MG/DL (ref 7–20)
CALCIUM SERPL-MCNC: 8.8 MG/DL (ref 8.3–10.6)
CHLORIDE BLD-SCNC: 106 MMOL/L (ref 99–110)
CHOLESTEROL, TOTAL: 183 MG/DL (ref 0–199)
CO2: 21 MMOL/L (ref 21–32)
CREAT SERPL-MCNC: 0.8 MG/DL (ref 0.6–1.2)
EOSINOPHILS ABSOLUTE: 0.1 K/UL (ref 0–0.6)
EOSINOPHILS RELATIVE PERCENT: 2 %
GFR AFRICAN AMERICAN: >60
GFR NON-AFRICAN AMERICAN: >60
GLOBULIN: 2.5 G/DL
GLUCOSE BLD-MCNC: 77 MG/DL (ref 70–99)
HCT VFR BLD CALC: 35.9 % (ref 36–48)
HDLC SERPL-MCNC: 84 MG/DL (ref 40–60)
HEMOGLOBIN: 12.1 G/DL (ref 12–16)
HEPATITIS C ANTIBODY INTERPRETATION: NORMAL
LDL CHOLESTEROL CALCULATED: 82 MG/DL
LYMPHOCYTES ABSOLUTE: 1.9 K/UL (ref 1–5.1)
LYMPHOCYTES RELATIVE PERCENT: 35.9 %
MCH RBC QN AUTO: 28.1 PG (ref 26–34)
MCHC RBC AUTO-ENTMCNC: 33.5 G/DL (ref 31–36)
MCV RBC AUTO: 83.6 FL (ref 80–100)
MONOCYTES ABSOLUTE: 0.4 K/UL (ref 0–1.3)
MONOCYTES RELATIVE PERCENT: 7 %
NEUTROPHILS ABSOLUTE: 2.9 K/UL (ref 1.7–7.7)
NEUTROPHILS RELATIVE PERCENT: 54.5 %
PDW BLD-RTO: 14.3 % (ref 12.4–15.4)
PLATELET # BLD: 304 K/UL (ref 135–450)
PMV BLD AUTO: 8.1 FL (ref 5–10.5)
POTASSIUM SERPL-SCNC: 4.3 MMOL/L (ref 3.5–5.1)
RBC # BLD: 4.3 M/UL (ref 4–5.2)
SODIUM BLD-SCNC: 139 MMOL/L (ref 136–145)
TOTAL PROTEIN: 6.8 G/DL (ref 6.4–8.2)
TRIGL SERPL-MCNC: 83 MG/DL (ref 0–150)
VLDLC SERPL CALC-MCNC: 17 MG/DL
WBC # BLD: 5.3 K/UL (ref 4–11)

## 2021-06-09 ENCOUNTER — TELEPHONE (OUTPATIENT)
Dept: FAMILY MEDICINE CLINIC | Age: 76
End: 2021-06-09

## 2021-06-09 LAB
ESTIMATED AVERAGE GLUCOSE: 119.8 MG/DL
HBA1C MFR BLD: 5.8 %

## 2021-06-16 ENCOUNTER — OFFICE VISIT (OUTPATIENT)
Dept: FAMILY MEDICINE CLINIC | Age: 76
End: 2021-06-16
Payer: MEDICARE

## 2021-06-16 VITALS
WEIGHT: 225 LBS | SYSTOLIC BLOOD PRESSURE: 156 MMHG | OXYGEN SATURATION: 98 % | HEIGHT: 62 IN | HEART RATE: 64 BPM | BODY MASS INDEX: 41.41 KG/M2 | DIASTOLIC BLOOD PRESSURE: 92 MMHG

## 2021-06-16 DIAGNOSIS — M54.2 NECK PAIN: ICD-10-CM

## 2021-06-16 DIAGNOSIS — R51.9 OCCIPITAL PAIN: Primary | ICD-10-CM

## 2021-06-16 PROCEDURE — G8417 CALC BMI ABV UP PARAM F/U: HCPCS | Performed by: PHYSICIAN ASSISTANT

## 2021-06-16 PROCEDURE — G8400 PT W/DXA NO RESULTS DOC: HCPCS | Performed by: PHYSICIAN ASSISTANT

## 2021-06-16 PROCEDURE — 99214 OFFICE O/P EST MOD 30 MIN: CPT | Performed by: PHYSICIAN ASSISTANT

## 2021-06-16 PROCEDURE — 96372 THER/PROPH/DIAG INJ SC/IM: CPT | Performed by: PHYSICIAN ASSISTANT

## 2021-06-16 PROCEDURE — 4040F PNEUMOC VAC/ADMIN/RCVD: CPT | Performed by: PHYSICIAN ASSISTANT

## 2021-06-16 PROCEDURE — 1036F TOBACCO NON-USER: CPT | Performed by: PHYSICIAN ASSISTANT

## 2021-06-16 PROCEDURE — G8427 DOCREV CUR MEDS BY ELIG CLIN: HCPCS | Performed by: PHYSICIAN ASSISTANT

## 2021-06-16 PROCEDURE — 1090F PRES/ABSN URINE INCON ASSESS: CPT | Performed by: PHYSICIAN ASSISTANT

## 2021-06-16 PROCEDURE — 1123F ACP DISCUSS/DSCN MKR DOCD: CPT | Performed by: PHYSICIAN ASSISTANT

## 2021-06-16 RX ORDER — BACLOFEN 5 MG/1
TABLET ORAL
Qty: 30 TABLET | Refills: 0 | Status: SHIPPED | OUTPATIENT
Start: 2021-06-16 | End: 2021-09-29 | Stop reason: SDUPTHER

## 2021-06-16 RX ORDER — PREDNISONE 10 MG/1
TABLET ORAL
Qty: 20 TABLET | Refills: 0 | Status: SHIPPED | OUTPATIENT
Start: 2021-06-16 | End: 2021-09-29 | Stop reason: SINTOL

## 2021-06-16 RX ORDER — KETOROLAC TROMETHAMINE 30 MG/ML
60 INJECTION, SOLUTION INTRAMUSCULAR; INTRAVENOUS ONCE
Status: COMPLETED | OUTPATIENT
Start: 2021-06-16 | End: 2021-06-16

## 2021-06-16 RX ADMIN — KETOROLAC TROMETHAMINE 60 MG: 30 INJECTION, SOLUTION INTRAMUSCULAR; INTRAVENOUS at 11:51

## 2021-06-16 NOTE — PROGRESS NOTES
6/16/2021    María Gray    Chief Complaint   Patient presents with    Shoulder Pain     Pt reports right shoulder pain radiating up the neck to the head, pt states she has had 2 TIA's and is concerned she is going to have another one, most recent TIA was about 5 years ago       HPI  History obtained from the patient. Viry Ugalde is a 68 y.o. female who presents today for pain in her right neck and shoulder. Patient is tearful when I entered the room. She states that she is worried that she is going to have another TIA. She complains of pain to the left side of her neck and the occipital region. She describes this as a sharp \"jabbing pain,\" not a headache. She states that \"it just come on all of a sudden. \"  She states that the only time she has had pain like this before is when before she had a TIA. She states that she was sitting with her  in the hospital (he was admitted) a few years ago, when she developed pain to the back of her neck, and shortly later, she passed out and was admitted herself, and was told that she had a TIA. Denies injuries, falls, vision changes, speech, facial drooping, numbness and weakness to extremities, dizziness. REVIEW OF SYMPTOMS  Review of Systems   Constitutional: Negative for chills and fever. Respiratory: Negative for cough and shortness of breath. Gastrointestinal: Negative for diarrhea and vomiting. PAST MEDICAL HISTORY  Past Medical History:   Diagnosis Date    (HFpEF) heart failure with preserved ejection fraction (HCC)     Chronic anemia     Chronic pain     right hip and knee, pain management, Ligia Menjivar PA-C [place]    Essential hypertension     GERD (gastroesophageal reflux disease)     H/O percutaneous left heart catheterization 12/13/2016    False positive. No CAD    History of nuclear stress test 12/06/2016    lexiscan-mild ischemia mid inferior,EF70%    Hx of echocardiogram 06/26/2017    EF 55-60%. Grade 1 diastolic dysfunction.  Hyperhomocysteinemia (HCC)     Moderate depressive disorder     Morbid obesity (HCC)     Neuropathy     Normocytic anemia     Orthostasis     Osteoarthritis     Sleep apnea     Spinal stenosis of lumbar region        FAMILY HISTORY  Family History   Problem Relation Age of Onset    Cancer Mother     High Blood Pressure Mother     Stroke Mother     Heart Disease Father     High Blood Pressure Father     Cancer Maternal Aunt     Diabetes type 2  Daughter     Coronary Art Dis Daughter     Obesity Daughter     COPD Daughter     No Known Problems Daughter     No Known Problems Son     No Known Problems Son     No Known Problems Son     No Known Problems Son     No Known Problems Son     No Known Problems Son        SOCIAL HISTORY  Social History     Socioeconomic History    Marital status:      Spouse name: None    Number of children: 6    Years of education: None    Highest education level: None   Occupational History    Occupation: management     Comment: /retired   Tobacco Use    Smoking status: Never Smoker    Smokeless tobacco: Never Used   Vaping Use    Vaping Use: Never used   Substance and Sexual Activity    Alcohol use: No    Drug use: No    Sexual activity: Not Currently     Partners: Male   Other Topics Concern    None   Social History Narrative    None     Social Determinants of Health     Financial Resource Strain:     Difficulty of Paying Living Expenses:    Food Insecurity:     Worried About Running Out of Food in the Last Year:     Ran Out of Food in the Last Year:    Transportation Needs:     Lack of Transportation (Medical):      Lack of Transportation (Non-Medical):    Physical Activity:     Days of Exercise per Week:     Minutes of Exercise per Session:    Stress:     Feeling of Stress :    Social Connections:     Frequency of Communication with Friends and Family:     Frequency of Social Gatherings with Friends and Family:  Attends Shinto Services:     Active Member of Clubs or Organizations:    Ainsley Valdovinos Attends Club or Organization Meetings:     Marital Status:    Intimate Partner Violence:     Fear of Current or Ex-Partner:     Emotionally Abused:     Physically Abused:     Sexually Abused:         SURGICAL HISTORY  Past Surgical History:   Procedure Laterality Date    CATARACT REMOVAL  2008    CHOLECYSTECTOMY      EYE SURGERY      cataracts    GASTRIC BYPASS SURGERY  2010    HYSTERECTOMY      JOINT REPLACEMENT Left 09/17/2018    [facility]    JOINT REPLACEMENT Right 2003    Dr. Naila Craig  2006    total lt shoulder     SHOULDER SURGERY  2006    total rt shoulder    TOTAL KNEE ARTHROPLASTY Right     TOTAL KNEE ARTHROPLASTY  02/2011    left       CURRENT MEDICATIONS  Current Outpatient Medications   Medication Sig Dispense Refill    Baclofen (LIORESAL) 5 MG tablet Take 1 or 2 tablets by mouth up to three times daily as needed for back pain. 30 tablet 0    predniSONE (DELTASONE) 10 MG tablet 1 tab 4 times daily X 2 days. THEN 1 tab 3 times daily X 2 days. THEN  1 tab 2 times daily X 2 days. THEN 0.5 tab 2 times daily X 2 days 20 tablet 0    celecoxib (CELEBREX) 200 MG capsule TAKE 1 CAPSULE BY MOUTH TWICE DAILY 180 capsule 0    rOPINIRole (REQUIP) 0.5 MG tablet Take 1 tablet by mouth 3 times daily 270 tablet 1    sertraline (ZOLOFT) 100 MG tablet Take 1 tablet by mouth daily 90 tablet 1    omeprazole (PRILOSEC) 20 MG delayed release capsule TAKE 1 CAPSULE BY MOUTH TWICE DAILY 180 capsule 0    mirabegron (MYRBETRIQ) 25 MG TB24 Take 1 tablet by mouth daily 90 tablet 1    colestipol (COLESTID) 1 g tablet Take 2 tablets by mouth daily as needed (diarrhea) 60 tablet 3    amitriptyline (ELAVIL) 25 MG tablet TAKE 1 TABLET BY MOUTH EVERY DAY AT BEDTIME 90 tablet 0    oxyCODONE-acetaminophen (PERCOCET) 7.5-325 MG per tablet Take 1 tablet by mouth 3 times daily as needed for Pain.       diphenhydrAMINE (BENADRYL) 25 MG tablet Take 25 mg by mouth nightly       acetaminophen (TYLENOL) 500 MG tablet Take 500 mg by mouth as needed for Pain      hydrOXYzine (ATARAX) 25 MG tablet Take 1 tablet by mouth twice daily as needed for anxiety (Patient not taking: Reported on 6/16/2021) 60 tablet 2     No current facility-administered medications for this visit. ALLERGIES  Allergies   Allergen Reactions    Latex        RECENT LABS    Lab Results   Component Value Date    LABA1C 5.8 06/08/2021     Lab Results   Component Value Date    .8 06/08/2021       Lab Results   Component Value Date    CHOL 183 06/08/2021    CHOL 159 07/17/2019    CHOL 129 08/03/2018     Lab Results   Component Value Date    LDLCALC 82 06/08/2021    LDLCALC 64 07/17/2019       Lab Results   Component Value Date    WBC 5.3 06/08/2021    HGB 12.1 06/08/2021    HCT 35.9 (L) 06/08/2021    MCV 83.6 06/08/2021     06/08/2021       PHYSICAL EXAM  BP (!) 156/92 (Site: Right Upper Arm)   Pulse 64   Ht 5' 2\" (1.575 m)   Wt 225 lb (102.1 kg)   SpO2 98%   BMI 41.15 kg/m²     Physical Exam  Constitutional:       Appearance: Normal appearance. HENT:      Head: Normocephalic and atraumatic. Eyes:      Comments: EOM grossly intact. Neck:      Comments: No TTP over shoulders, neck or head. Pain is worsened with lateral rotation and lateral flexion. Cardiovascular:      Rate and Rhythm: Normal rate and regular rhythm. Heart sounds: No murmur heard. No friction rub. No gallop. Pulmonary:      Effort: Pulmonary effort is normal.      Breath sounds: Normal breath sounds. No wheezing, rhonchi or rales. Skin:     General: Skin is warm and dry. Neurological:      Mental Status: She is alert and oriented to person, place, and time. Comments: Cranial nerves II-XII grossly intact. Point localization intact with finger to nose test. Rapidly alternating movements are coordinated and smooth.  Movement accuracy intact bilaterally with heel to shin. Upper and lower extremity strength 5/5 bilaterally. Romberg negative. Gait is coordinated. Psychiatric:         Mood and Affect: Mood normal.         Behavior: Behavior normal.       ASSESSMENT & PLAN  1. Occipital pain  Explained to the patient that she has had no neurologic symptoms, and her neurologic exam shows no deficits whatsoever, so it is extremely unlikely that her pain is due to a TIA. Fara VILLALOBOS, administered Toradol injection today in the office. Prescribed baclofen and warned the patient of side effects including dizziness. Prescribed prednisone taper and instructed patient to let me know if her pain is not improving within the next few days. Discussed this treatment plan with Dr. Henry Toure, and he is in agreement. - Baclofen (LIORESAL) 5 MG tablet; Take 1 or 2 tablets by mouth up to three times daily as needed for back pain. Dispense: 30 tablet; Refill: 0  - ketorolac (TORADOL) injection 60 mg  - predniSONE (DELTASONE) 10 MG tablet; 1 tab 4 times daily X 2 days. THEN 1 tab 3 times daily X 2 days. THEN  1 tab 2 times daily X 2 days. THEN 0.5 tab 2 times daily X 2 days  Dispense: 20 tablet; Refill: 0    2. Neck pain  See above (#1). - Baclofen (LIORESAL) 5 MG tablet; Take 1 or 2 tablets by mouth up to three times daily as needed for back pain. Dispense: 30 tablet; Refill: 0  - ketorolac (TORADOL) injection 60 mg          No follow-ups on file.             Electronically signed by Rachel Blevins PA-C on 6/16/2021

## 2021-07-07 DIAGNOSIS — K52.9 CHRONIC DIARRHEA: ICD-10-CM

## 2021-07-07 RX ORDER — MONTELUKAST SODIUM 4 MG/1
TABLET, CHEWABLE ORAL
Qty: 60 TABLET | Refills: 3 | Status: SHIPPED | OUTPATIENT
Start: 2021-07-07 | End: 2021-09-29

## 2021-07-07 RX ORDER — MONTELUKAST SODIUM 4 MG/1
TABLET, CHEWABLE ORAL
Qty: 60 TABLET | Refills: 3 | OUTPATIENT
Start: 2021-07-07

## 2021-07-13 ENCOUNTER — OFFICE VISIT (OUTPATIENT)
Dept: FAMILY MEDICINE CLINIC | Age: 76
End: 2021-07-13
Payer: MEDICARE

## 2021-07-13 VITALS
OXYGEN SATURATION: 96 % | WEIGHT: 224 LBS | DIASTOLIC BLOOD PRESSURE: 86 MMHG | SYSTOLIC BLOOD PRESSURE: 126 MMHG | HEIGHT: 62 IN | HEART RATE: 58 BPM | BODY MASS INDEX: 41.22 KG/M2

## 2021-07-13 DIAGNOSIS — M54.42 CHRONIC LEFT-SIDED LOW BACK PAIN WITH LEFT-SIDED SCIATICA: Primary | ICD-10-CM

## 2021-07-13 DIAGNOSIS — H61.23 BILATERAL IMPACTED CERUMEN: ICD-10-CM

## 2021-07-13 DIAGNOSIS — G89.29 CHRONIC LEFT-SIDED LOW BACK PAIN WITH LEFT-SIDED SCIATICA: Primary | ICD-10-CM

## 2021-07-13 PROCEDURE — 99213 OFFICE O/P EST LOW 20 MIN: CPT | Performed by: PHYSICIAN ASSISTANT

## 2021-07-13 RX ORDER — TIZANIDINE 2 MG/1
TABLET ORAL
COMMUNITY
Start: 2021-07-07 | End: 2021-09-29

## 2021-07-13 NOTE — PROGRESS NOTES
7/13/2021    Ana Agee    Chief Complaint   Patient presents with   [facility] Referral - General     for pain management.  Other     had ear infection last month, cant hear out of right ear. HPI  History was obtained from pt. Adrianne Donaldson is a 68 y.o. female with a PMHx as listed below who presents today for pain mgmt referral. She was going to [facility] but was dismissed for missing a pill count due to an ear infection. Cant hear out of the right ear since the ear infection last month. Thinks need ears cleaned out of wax. Has never had trouble hearing or cerumen impaction before. Denies any fever, sneezing, coughing, congestion, n/v/d or pain. 1. Chronic left-sided low back pain with left-sided sciatica    2. Bilateral impacted cerumen             REVIEW OF SYMPTOMS    Review of Systems    PAST MEDICAL HISTORY  Past Medical History:   Diagnosis Date    (HFpEF) heart failure with preserved ejection fraction (HCC)     Chronic anemia     Chronic pain     right hip and knee, pain management, Ligia Menjivar PA-C [place]    Essential hypertension     GERD (gastroesophageal reflux disease)     H/O percutaneous left heart catheterization 12/13/2016    False positive. No CAD    History of nuclear stress test 12/06/2016    lexiscan-mild ischemia mid inferior,EF70%    Hx of echocardiogram 06/26/2017    EF 55-60%. Grade 1 diastolic dysfunction.     Hyperhomocysteinemia (HCC)     Moderate depressive disorder     Morbid obesity (HCC)     Neuropathy     Normocytic anemia     Orthostasis     Osteoarthritis     Sleep apnea     Spinal stenosis of lumbar region        FAMILY HISTORY  Family History   Problem Relation Age of Onset    Cancer Mother     High Blood Pressure Mother     Stroke Mother     Heart Disease Father     High Blood Pressure Father     Cancer Maternal Aunt     Diabetes type 2  Daughter     Coronary Art Dis Daughter     Obesity Daughter     COPD Daughter     No Known Problems Daughter     No Known Problems Son     No Known Problems Son     No Known Problems Son     No Known Problems Son     No Known Problems Son     No Known Problems Son        SOCIAL HISTORY  Social History     Socioeconomic History    Marital status:      Spouse name: Not on file    Number of children: 6    Years of education: Not on file    Highest education level: Not on file   Occupational History    Occupation: management     Comment: /retired   Tobacco Use    Smoking status: Never Smoker    Smokeless tobacco: Never Used   Vaping Use    Vaping Use: Never used   Substance and Sexual Activity    Alcohol use: No    Drug use: No    Sexual activity: Not Currently     Partners: Male   Other Topics Concern    Not on file   Social History Narrative    Not on file     Social Determinants of Health     Financial Resource Strain:     Difficulty of Paying Living Expenses:    Food Insecurity:     Worried About Running Out of Food in the Last Year:     [address] in the Last Year:    Transportation Needs:     Lack of Transportation (Medical):      Lack of Transportation (Non-Medical):    Physical Activity:     Days of Exercise per Week:     Minutes of Exercise per Session:    Stress:     Feeling of Stress :    Social Connections:     Frequency of Communication with Friends and Family:     Frequency of Social Gatherings with Friends and Family:     Attends Caodaism Services:     Active Member of Clubs or Organizations:     Attends Club or Organization Meetings:     Marital Status:    Intimate Partner Violence:     Fear of Current or Ex-Partner:     Emotionally Abused:     Physically Abused:     Sexually Abused:         SURGICAL HISTORY  Past Surgical History:   Procedure Laterality Date    CATARACT REMOVAL  2008    CHOLECYSTECTOMY      EYE SURGERY      cataracts    GASTRIC BYPASS SURGERY  2010    HYSTERECTOMY      JOINT REPLACEMENT Left 09/17/2018

## 2021-07-20 ENCOUNTER — NURSE ONLY (OUTPATIENT)
Dept: FAMILY MEDICINE CLINIC | Age: 76
End: 2021-07-20

## 2021-07-22 ENCOUNTER — TELEPHONE (OUTPATIENT)
Dept: FAMILY MEDICINE CLINIC | Age: 76
End: 2021-07-22

## 2021-07-22 NOTE — TELEPHONE ENCOUNTER
If the patient would like a referral to a different pain management clinic for a second opinion, we can send one for her. However, we cannot prescribe Percocet for her. For one thing, we do not have a pain contract with her (her chart shows that she has a pain contract with a different office, so we would be violating that contract by sending something in). (For another thing, it appears that she's been getting this from various other providers in [place], [place], all over, so I do not feel comfortable sending in controlled medications for her).

## 2021-07-22 NOTE — TELEPHONE ENCOUNTER
Patient called and stated she went to [place] [address] today and saw a female Jair Lashanda. Patient was told that she would not write any medication for pain. Jair Pyle wanted wanted patient to get shots and patient does not want shots.  Call patient and advise Patient has had no Pecoset since  last month

## 2021-07-23 ENCOUNTER — TELEPHONE (OUTPATIENT)
Dept: FAMILY MEDICINE CLINIC | Age: 76
End: 2021-07-23

## 2021-07-23 NOTE — TELEPHONE ENCOUNTER
Patient called to find out if she will be referred to another pain specialist other than [facility] because all they offered her was an injection in her back and she does not want to do that-----the doctor their would not give her a prescription for any pain pills. She said her daughter had an injection in her back for pain and the procedure did not go well at all, so patient is very hesitant to do that kind of injection. She would like a call back today, if possible. Please advise.

## 2021-07-24 DIAGNOSIS — K21.9 GASTROESOPHAGEAL REFLUX DISEASE WITHOUT ESOPHAGITIS: ICD-10-CM

## 2021-07-26 DIAGNOSIS — G89.29 CHRONIC LEFT-SIDED LOW BACK PAIN WITH LEFT-SIDED SCIATICA: Primary | ICD-10-CM

## 2021-07-26 DIAGNOSIS — M54.42 CHRONIC LEFT-SIDED LOW BACK PAIN WITH LEFT-SIDED SCIATICA: Primary | ICD-10-CM

## 2021-07-26 RX ORDER — OMEPRAZOLE 20 MG/1
CAPSULE, DELAYED RELEASE ORAL
Qty: 180 CAPSULE | Refills: 0 | Status: SHIPPED | OUTPATIENT
Start: 2021-07-26 | End: 2021-10-21

## 2021-07-26 NOTE — TELEPHONE ENCOUNTER
I have referred her to Dr. Lance Wang. The patient should receive a call within 7 to 10 days to get scheduled for an appointment. If they do not receive a call, the patient needs to call themself to get scheduled. Alan ASHER  [address] [place] [address], [address] [place] [address],Third Floor  [phone]

## 2021-07-28 ENCOUNTER — TELEPHONE (OUTPATIENT)
Dept: FAMILY MEDICINE CLINIC | Age: 76
End: 2021-07-28

## 2021-07-28 NOTE — TELEPHONE ENCOUNTER
PT CALLED ABOUT GETTING PERCOCET BECAUSE [facility] WANTS HE TO DO INJECTIONS INSTEAD OF PERCOCET AND SHE DOES NOT WANT THAT. SUSIE IS DOING A REF TO ANOTHER PAIN [facility] BUT IN THE MEAN TIME SHE NEEDS SOMETHING FOR PAIN. PLEASE ADVISE PT ON THIS. WOULD LIKE TO HEAR FROM SOMEONE.

## 2021-07-29 NOTE — TELEPHONE ENCOUNTER
We cannot prescribe any controlled medications for her, but we can send another steroid taper if the patient would like to try that? Or some prescription strength ibuprofen?  (She would take this INSTEAD of the Celebrex not on to of it because that would be too much NSAID)

## 2021-07-30 NOTE — TELEPHONE ENCOUNTER
Called pt and she states that she got a rash the last time she took the prednisone we called in . She also said she wants to go to a pain management in [place] .  She will call us Monday with the info and we can send a new referral

## 2021-08-16 ENCOUNTER — TELEPHONE (OUTPATIENT)
Dept: FAMILY MEDICINE CLINIC | Age: 76
End: 2021-08-16

## 2021-08-16 NOTE — TELEPHONE ENCOUNTER
Patient is requesting a referral to [address] [place] [address].  Fax [phone]  Phone [phone] They have an office here in [facility]

## 2021-08-17 ENCOUNTER — TELEPHONE (OUTPATIENT)
Dept: FAMILY MEDICINE CLINIC | Age: 76
End: 2021-08-17

## 2021-08-17 DIAGNOSIS — M54.42 CHRONIC LEFT-SIDED LOW BACK PAIN WITH LEFT-SIDED SCIATICA: Primary | ICD-10-CM

## 2021-08-17 DIAGNOSIS — G89.29 CHRONIC LEFT-SIDED LOW BACK PAIN WITH LEFT-SIDED SCIATICA: Primary | ICD-10-CM

## 2021-08-17 NOTE — TELEPHONE ENCOUNTER
Called and left voice message informing the patient the referral was made. Asked pt to return call with any questions.

## 2021-08-17 NOTE — TELEPHONE ENCOUNTER
Spoke with the patient per [facility] previous encounter note. Pt voiced understanding.

## 2021-08-17 NOTE — TELEPHONE ENCOUNTER
To Marco Cunningham-    Patient is returning your call from [facility] re: pain management referral ---please call patient at:    [phone] [facility]

## 2021-08-17 NOTE — TELEPHONE ENCOUNTER
Please call the patient and let her know that I went ahead and sent this referral for her, but this is the LAST pain management referral we are going to do. Thanks.

## 2021-08-17 NOTE — TELEPHONE ENCOUNTER
This will be the third pain management clinic we have referred her to within the last few months. Why was she not able to be seen at [facility] or Dr. Rosmery Chen?

## 2021-08-18 ENCOUNTER — HOSPITAL ENCOUNTER (EMERGENCY)
Age: 76
Discharge: HOME | End: 2021-08-18
Payer: MEDICARE

## 2021-08-18 VITALS
SYSTOLIC BLOOD PRESSURE: 123 MMHG | HEART RATE: 74 BPM | TEMPERATURE: 98.6 F | WEIGHT: 224 LBS | OXYGEN SATURATION: 97 % | RESPIRATION RATE: 16 BRPM | DIASTOLIC BLOOD PRESSURE: 87 MMHG | HEIGHT: 62 IN | BODY MASS INDEX: 41.22 KG/M2

## 2021-08-18 DIAGNOSIS — G89.29 ACUTE EXACERBATION OF CHRONIC LOW BACK PAIN: Primary | ICD-10-CM

## 2021-08-18 DIAGNOSIS — M54.50 ACUTE EXACERBATION OF CHRONIC LOW BACK PAIN: Primary | ICD-10-CM

## 2021-08-18 PROCEDURE — 6360000002 HC RX W HCPCS: Performed by: PHYSICIAN ASSISTANT

## 2021-08-18 PROCEDURE — 99285 EMERGENCY DEPT VISIT HI MDM: CPT

## 2021-08-18 PROCEDURE — 6370000000 HC RX 637 (ALT 250 FOR IP): Performed by: PHYSICIAN ASSISTANT

## 2021-08-18 PROCEDURE — 96372 THER/PROPH/DIAG INJ SC/IM: CPT

## 2021-08-18 RX ORDER — CYCLOBENZAPRINE HCL 10 MG
10 TABLET ORAL 3 TIMES DAILY PRN
Qty: 15 TABLET | Refills: 0 | Status: ON HOLD | OUTPATIENT
Start: 2021-08-18 | End: 2021-10-01 | Stop reason: HOSPADM

## 2021-08-18 RX ORDER — LIDOCAINE 50 MG/G
1 PATCH TOPICAL DAILY
Qty: 30 PATCH | Refills: 0 | Status: SHIPPED | OUTPATIENT
Start: 2021-08-18 | End: 2021-09-17

## 2021-08-18 RX ORDER — METHYLPREDNISOLONE 4 MG/1
TABLET ORAL
Qty: 1 KIT | Refills: 0 | Status: SHIPPED | OUTPATIENT
Start: 2021-08-18 | End: 2021-08-24

## 2021-08-18 RX ORDER — OXYCODONE HYDROCHLORIDE AND ACETAMINOPHEN 5; 325 MG/1; MG/1
1 TABLET ORAL ONCE
Status: COMPLETED | OUTPATIENT
Start: 2021-08-18 | End: 2021-08-18

## 2021-08-18 RX ORDER — KETOROLAC TROMETHAMINE 30 MG/ML
60 INJECTION, SOLUTION INTRAMUSCULAR; INTRAVENOUS ONCE
Status: COMPLETED | OUTPATIENT
Start: 2021-08-18 | End: 2021-08-18

## 2021-08-18 RX ORDER — ORPHENADRINE CITRATE 30 MG/ML
60 INJECTION INTRAMUSCULAR; INTRAVENOUS ONCE
Status: COMPLETED | OUTPATIENT
Start: 2021-08-18 | End: 2021-08-18

## 2021-08-18 RX ADMIN — ORPHENADRINE CITRATE 60 MG: 30 INJECTION INTRAMUSCULAR; INTRAVENOUS at 19:47

## 2021-08-18 RX ADMIN — KETOROLAC TROMETHAMINE 60 MG: 30 INJECTION, SOLUTION INTRAMUSCULAR at 19:48

## 2021-08-18 RX ADMIN — OXYCODONE HYDROCHLORIDE AND ACETAMINOPHEN 1 TABLET: 5; 325 TABLET ORAL at 19:47

## 2021-08-18 ASSESSMENT — PAIN SCALES - GENERAL
PAINLEVEL_OUTOF10: 8
PAINLEVEL_OUTOF10: 8

## 2021-08-18 ASSESSMENT — PAIN DESCRIPTION - LOCATION: LOCATION: BACK

## 2021-08-18 ASSESSMENT — PAIN DESCRIPTION - ORIENTATION: ORIENTATION: LOWER

## 2021-08-18 NOTE — ED TRIAGE NOTES
Pt sts \"I have scoliosis. I use to be on percocet for pain management but was sick when my doctor asked for a pill count so they took those away and want me to get shots and I dont want to do that. \"

## 2021-08-18 NOTE — ED NOTES
Pt. reviewed discharge instructions, follow up instructions, and new medications. Pt. Aware of medications sent to pharmacy. Pt. verbalizes understanding with no further questions. Pt. ambulatory and not showing any signs of distress.        Lore Llanos RN  08/18/21 1958

## 2021-08-19 NOTE — ED PROVIDER NOTES
eMERGENCY dEPARTMENT eNCOUnter        [facility]    Chief Complaint   Patient presents with    Back Pain       HPI    Aishwarya Bui is a 68 y.o. female who presents with back pain. Patient with chronic back pain 2/2 scoliosis. She states she was kicked out of pain management in June because she missed a pill count. She has been trying to get in to a new pain management office. She states her PCP won't prescribe her any Percocet. She states pain has been worse over the last 3-4 days. Denies any injury, fall, heavy lifting. Pain is localized to the bilateral para-lumbar areas with radiation down both legs. Denies numbness or tingling. Pain is sharp, aching. Severity 8/10. Denies bowel/bladder dysfunction or saddle anesthesia. She has been taking Tylenol without relief. REVIEW OF SYSTEMS    See HPI for further details. Review of systems otherwise negative. Constitutional:  Denies fever. Neck:  Denies neck pain. Respiratory:  Denies any shortness of breath. Cardiovascular:  Denies chest pain. GI:  Denies bowel dysfunction. :  Denies urinary incontinence. Musculoskeletal:  Denies edema, tenderness. Back:  + back pain  Integument:  Denies skin changes. Neurologic:  Denies any numbness or tingling. Denies saddle anesthesia. PAST MEDICAL HISTORY    Past Medical History:   Diagnosis Date    (HFpEF) heart failure with preserved ejection fraction (HCC)     Chronic anemia     Chronic pain     right hip and knee, pain management, Ligia Menjivar PA-C [place]    Essential hypertension     GERD (gastroesophageal reflux disease)     H/O percutaneous left heart catheterization 12/13/2016    False positive. No CAD    History of nuclear stress test 12/06/2016    lexiscan-mild ischemia mid inferior,EF70%    Hx of echocardiogram 06/26/2017    EF 55-60%. Grade 1 diastolic dysfunction.     Hyperhomocysteinemia (HCC)     Moderate depressive disorder     Morbid obesity ([address]     Neuropathy     Normocytic anemia     Orthostasis     Osteoarthritis     Sleep apnea     Spinal stenosis of lumbar region        SURGICAL HISTORY    Past Surgical History:   Procedure Laterality Date    CATARACT REMOVAL  2008    CHOLECYSTECTOMY      EYE SURGERY      cataracts    GASTRIC BYPASS SURGERY  2010    HYSTERECTOMY      JOINT REPLACEMENT Left 09/17/2018    [facility]    JOINT REPLACEMENT Right 2003    Dr. Chidi Carrion  2006    total lt shoulder     SHOULDER SURGERY  2006    total rt shoulder    TOTAL KNEE ARTHROPLASTY Right     TOTAL KNEE ARTHROPLASTY  02/2011    left       CURRENT MEDICATIONS    Current Outpatient Rx   Medication Sig Dispense Refill    methylPREDNISolone (MEDROL DOSEPACK) 4 MG tablet Take by mouth. 1 kit 0    cyclobenzaprine (FLEXERIL) 10 MG tablet Take 1 tablet by mouth 3 times daily as needed for Muscle spasms 15 tablet 0    lidocaine (LIDODERM) 5 % Place 1 patch onto the skin daily 12 hours on, 12 hours off. 30 patch 0    omeprazole (PRILOSEC) 20 MG delayed release capsule TAKE 1 CAPSULE BY MOUTH TWICE DAILY 180 capsule 0    tiZANidine (ZANAFLEX) 2 MG tablet TAKE 1 TABLET BY MOUTH THREE TIMES DAILY FOR 14 DAYS AS NEEDED      colestipol (COLESTID) 1 g tablet TAKE 2 TABLETS BY MOUTH DAILY AS NEEDED FOR DIARRHEA 60 tablet 3    Baclofen (LIORESAL) 5 MG tablet Take 1 or 2 tablets by mouth up to three times daily as needed for back pain. 30 tablet 0    predniSONE (DELTASONE) 10 MG tablet 1 tab 4 times daily X 2 days. THEN 1 tab 3 times daily X 2 days. THEN  1 tab 2 times daily X 2 days.  THEN 0.5 tab 2 times daily X 2 days 20 tablet 0    celecoxib (CELEBREX) 200 MG capsule TAKE 1 CAPSULE BY MOUTH TWICE DAILY 180 capsule 0    hydrOXYzine (ATARAX) 25 MG tablet Take 1 tablet by mouth twice daily as needed for anxiety (Patient not taking: Reported on 6/16/2021) 60 tablet 2    rOPINIRole (REQUIP) 0.5 MG tablet Take 1 tablet by mouth 3 times daily 270 tablet 1  sertraline (ZOLOFT) 100 MG tablet Take 1 tablet by mouth daily 90 tablet 1    amitriptyline (ELAVIL) 25 MG tablet TAKE 1 TABLET BY MOUTH EVERY DAY AT BEDTIME 90 tablet 0    oxyCODONE-acetaminophen (PERCOCET) 7.5-325 MG per tablet Take 1 tablet by mouth 3 times daily as needed for Pain.  diphenhydrAMINE (BENADRYL) 25 MG tablet Take 25 mg by mouth nightly       acetaminophen (TYLENOL) 500 MG tablet Take 500 mg by mouth as needed for Pain         ALLERGIES    Allergies   Allergen Reactions    Latex     Prednisone Hives       FAMILY HISTORY    Family History   Problem Relation Age of Onset    Cancer Mother     High Blood Pressure Mother     Stroke Mother     Heart Disease Father     High Blood Pressure Father     Cancer Maternal Aunt     Diabetes type 2  Daughter     Coronary Art Dis Daughter     Obesity Daughter     COPD Daughter     No Known Problems Daughter     No Known Problems Son     No Known Problems Son     No Known Problems Son     No Known Problems Son     No Known Problems Son     No Known Problems Son        SOCIAL HISTORY    Social History     Socioeconomic History    Marital status:       Spouse name: Not on file    Number of children: 6    Years of education: Not on file    Highest education level: Not on file   Occupational History    Occupation: management     Comment: /retired   Tobacco Use    Smoking status: Never Smoker    Smokeless tobacco: Never Used   Vaping Use    Vaping Use: Never used   Substance and Sexual Activity    Alcohol use: No    Drug use: No    Sexual activity: Not Currently     Partners: Male   Other Topics Concern    Not on file   Social History Narrative    Not on file     Social Determinants of Health     Financial Resource Strain:     Difficulty of Paying Living Expenses:    Food Insecurity:     Worried About Running Out of Food in the Last Year:     [address] in the Last Year:    Transportation Needs:  Lack of Transportation (Medical):  Lack of Transportation (Non-Medical):    Physical Activity:     Days of Exercise per Week:     Minutes of Exercise per Session:    Stress:     Feeling of Stress :    Social Connections:     Frequency of Communication with Friends and Family:     Frequency of Social Gatherings with Friends and Family:     Attends Jew Services:     Active Member of Clubs or Organizations:     Attends Club or Organization Meetings:     Marital Status:    Intimate Partner Violence:     Fear of Current or Ex-Partner:     Emotionally Abused:     Physically Abused:     Sexually Abused:        PHYSICAL EXAM    VITAL SIGNS: /87   Pulse 74   Temp 98.6 °F (37 °C) (Oral)   Resp 16   Ht 5' 2\" (1.575 m)   Wt 224 lb (101.6 kg)   SpO2 97%   BMI 40.97 kg/m²    Constitutional:  Well developed, well nourished, no acute distress, non-toxic appearance   HENT:  NC/AT. Ears, nose, mouth normal.  Neck:  Supple, no cervical spinal tenderness. Full ROM without difficulty. Respiratory:  Normal respiratory effort. Musculoskeletal:  No edema, no tenderness, no deformities. Back:  Tenderness to palpation over the bilateral para-lumbar areas. No midline thoracic or lumbar spinal tenderness. No overlying erythema, rashes, masses. Straight leg test negative. Lower extremity strength 5/5 bilaterally. Sensation intact to light touch. DTRs intact. DP symmetric. Integument:  Well hydrated. Neurologic:  Alert and oriented. No focal deficits. RADIOLOGY/PROCEDURES    N/A    ED COURSE & MEDICAL DECISION MAKING    Pertinent Labs & Imaging studies reviewed. (See chart for details)  -  Patient seen and evaluated in the emergency department. -  Triage and nursing notes reviewed and incorporated. -  Old chart records reviewed and incorporated. -  Supervising physician was Dr. Steven An. Patient was seen independently.   -  Differential diagnosis includes: DDD, spinal stenosis, herniated disc, cauda equina, AAA, lumbar strain, discitis, epidural abscess, and others  -  Patient treated with Toradol, Norflex, and a single Percocet in the ED.  -  Patient without any acute injury and no change in radicular symptoms, no new red flag symptoms. Given a dose of medication here in the ED--sent in prescriptions for Medrol, Flexeril, and Lidoderm patches. I did receive a call from [facility] me that patient just received a prescription for Tizanidine on 8/4 from a Pain Management office in [facility], so Flexeril was canceled. -  Patient dc home. In light of current events, I did utilize appropriate PPE (including N95 and surgical face mask, safety glasses, and gloves, as recommended by the health facility/national standard best practice, during my bedside interactions with the patient. FINAL IMPRESSION    1.  Acute exacerbation of chronic low back pain              Az Mooney PA-C  08/18/21 2027

## 2021-08-23 ENCOUNTER — HOSPITAL ENCOUNTER (OUTPATIENT)
Age: 76
Setting detail: SPECIMEN
Discharge: HOME | End: 2021-08-23
Payer: MEDICARE

## 2021-08-23 ENCOUNTER — OFFICE VISIT (OUTPATIENT)
Dept: FAMILY MEDICINE CLINIC | Age: 76
End: 2021-08-23
Payer: MEDICARE

## 2021-08-23 VITALS — TEMPERATURE: 97.1 F

## 2021-08-23 DIAGNOSIS — J06.9 VIRAL URI: Primary | ICD-10-CM

## 2021-08-23 PROCEDURE — G8400 PT W/DXA NO RESULTS DOC: HCPCS | Performed by: NURSE PRACTITIONER

## 2021-08-23 PROCEDURE — 99213 OFFICE O/P EST LOW 20 MIN: CPT | Performed by: NURSE PRACTITIONER

## 2021-08-23 PROCEDURE — 4040F PNEUMOC VAC/ADMIN/RCVD: CPT | Performed by: NURSE PRACTITIONER

## 2021-08-23 PROCEDURE — U0005 INFEC AGEN DETEC AMPLI PROBE: HCPCS

## 2021-08-23 PROCEDURE — 1090F PRES/ABSN URINE INCON ASSESS: CPT | Performed by: NURSE PRACTITIONER

## 2021-08-23 PROCEDURE — G8417 CALC BMI ABV UP PARAM F/U: HCPCS | Performed by: NURSE PRACTITIONER

## 2021-08-23 PROCEDURE — 1123F ACP DISCUSS/DSCN MKR DOCD: CPT | Performed by: NURSE PRACTITIONER

## 2021-08-23 PROCEDURE — 1036F TOBACCO NON-USER: CPT | Performed by: NURSE PRACTITIONER

## 2021-08-23 PROCEDURE — G8428 CUR MEDS NOT DOCUMENT: HCPCS | Performed by: NURSE PRACTITIONER

## 2021-08-23 PROCEDURE — U0003 INFECTIOUS AGENT DETECTION BY NUCLEIC ACID (DNA OR RNA); SEVERE ACUTE RESPIRATORY SYNDROME CORONAVIRUS 2 (SARS-COV-2) (CORONAVIRUS DISEASE [COVID-19]), AMPLIFIED PROBE TECHNIQUE, MAKING USE OF HIGH THROUGHPUT TECHNOLOGIES AS DESCRIBED BY CMS-2020-01-R: HCPCS

## 2021-08-23 RX ORDER — BENZONATATE 200 MG/1
200 CAPSULE ORAL 3 TIMES DAILY PRN
Qty: 30 CAPSULE | Refills: 0 | Status: SHIPPED | OUTPATIENT
Start: 2021-08-23 | End: 2021-08-30

## 2021-08-23 RX ORDER — ALBUTEROL SULFATE 90 UG/1
2 AEROSOL, METERED RESPIRATORY (INHALATION) 4 TIMES DAILY PRN
Qty: 3 INHALER | Refills: 0 | Status: SHIPPED | OUTPATIENT
Start: 2021-08-23 | End: 2021-11-12

## 2021-08-23 NOTE — PATIENT INSTRUCTIONS
Your COVID 19 test can take 1-5 days for the results to come back. We ask that you make a Mychart page and view your test results this way. You will need to Self quarantine until you know your results. Increase fluids and rest  Saline nasal spray as needed for nasal congestion  Warm salt gargles as needed for throat discomfort  Monitor temperature twice a day  Tylenol as needed for fevers and/or discomfort. Big deep breaths periodically throughout the day  Regular Mucinex over the counter as needed for chest congestion  If symptoms worsen -Go to the ER. Follow up with your primary care provider      To Whom it May Concern:    Terry Michelle was tested for COVID-19 8/23/2021. He/she must stay home until test results are back. If test is positive, he/she must quarantine for a total of 10 days starting from day one of symptom onset. He/she must also be fever-free for 24 hours at that time, and also have improvement in symptoms. We do not recommend retesting as patients may continue to test positive for months even though no longer contagious. It is suggested you call [address] or [address] [place] [address] with any questions regarding quarantine timeframe/return to work/school details.

## 2021-08-23 NOTE — PROGRESS NOTES
8/23/21  [place] Patient  1945    FLU/COVID-19 CLINIC EVALUATION    HPI SYMPTOMS:    Employer: Retired    [] Fevers  [] Chills  [x] Cough  [] Coughing up blood  [x] Chest Congestion  [x] Nasal Congestion  [] Feeling short of breath  [] Sometimes  [] Frequently  [] All the time  [] Chest pain  [x] Headaches  [x]Tolerable  [] Severe  [x] Sore throat  [] Muscle aches  [] Nausea  [] Vomiting  []Unable to keep fluids down  [] Diarrhea  []Severe    [] OTHER SYMPTOMS:      Symptom Duration:   [] 1  [x] 2   [] 3   [] 4    [] 5   [] 6   [] 7   [] 8   [] 9   [] 10   [] 11   [] 12   [] 13   [] 14   [] Longer than 14 days    Symptom course:   [x] Worsening     [] Stable     [] Improving    RISK FACTORS:    [] Pregnant or possibly pregnant  [x] Age over 61  [] Diabetes  [] Heart disease  [] Asthma  [] COPD/Other chronic lung diseases  [] Active Cancer  [] On Chemotherapy  [] Taking oral steroids  [] History Lymphoma/Leukemia  [] Close contact with a lab confirmed COVID-19 patient within 14 days of symptom onset  [] History of travel from affected geographical areas within 14 days of symptom onset       VITALS:  There were no vitals filed for this visit. TESTS:    POCT FLU:  [] Positive     []Negative    ASSESSMENT:    [] Flu  [] Possible COVID-19  [] Strep    PLAN:    [] Discharge home with written instructions for:  [] Flu management  [] Possible COVID-19 infection with self-quarantine and management of symptoms  [] Follow-up with primary care physician or emergency department if worsens  [] Evaluation per physician/NP/PA in clinic  [] Sent to ER       An  electronic signature was used to authenticate this note.      --Juany Lockett LPN on 2/33/2524 at 3:83 PM

## 2021-08-23 NOTE — PROGRESS NOTES
8/23/2021    HPI:  Chief complaint and history of present illness as per medical assistant/nurse documented today in the Flu/COVID-19 clinic. MEDICATIONS:  Prior to Visit Medications    Medication Sig Taking? Authorizing Provider   methylPREDNISolone (MEDROL DOSEPACK) 4 MG tablet Take by mouth. Marlena Evans PA-C   cyclobenzaprine (FLEXERIL) 10 MG tablet Take 1 tablet by mouth 3 times daily as needed for Muscle spasms  Brianne Ashton PA-C   lidocaine (LIDODERM) 5 % Place 1 patch onto the skin daily 12 hours on, 12 hours off. Marlena Evans PA-C   omeprazole (PRILOSEC) 20 MG delayed release capsule TAKE 1 CAPSULE BY MOUTH TWICE DAILY  Lgoan Mendoza PA-C   tiZANidine (ZANAFLEX) 2 MG tablet TAKE 1 TABLET BY MOUTH THREE TIMES DAILY FOR 14 DAYS AS NEEDED  Historical Provider, MD   colestipol (COLESTID) 1 g tablet TAKE 2 TABLETS BY MOUTH DAILY AS NEEDED FOR DIARRHEA  Nicolasa Mendoza PA-C   Baclofen (LIORESAL) 5 MG tablet Take 1 or 2 tablets by mouth up to three times daily as needed for back pain. Logan Mendoza PA-C   predniSONE (DELTASONE) 10 MG tablet 1 tab 4 times daily X 2 days. THEN 1 tab 3 times daily X 2 days. THEN  1 tab 2 times daily X 2 days. THEN 0.5 tab 2 times daily X 2 days  Elise Daly PA-C   celecoxib (CELEBREX) 200 MG capsule TAKE 1 CAPSULE BY MOUTH TWICE DAILY  Elise Daly PA-C   hydrOXYzine (ATARAX) 25 MG tablet Take 1 tablet by mouth twice daily as needed for anxiety  Patient not taking: Reported on 6/16/2021  Elise Daly PA-C   rOPINIRole (REQUIP) 0.5 MG tablet Take 1 tablet by mouth 3 times daily  Logan Mendoza PA-C   sertraline (ZOLOFT) 100 MG tablet Take 1 tablet by mouth daily  Elise Daly PA-C   amitriptyline (ELAVIL) 25 MG tablet TAKE 1 TABLET BY MOUTH EVERY DAY AT BEDTIME  Kiley Clements MD   oxyCODONE-acetaminophen (PERCOCET) 7.5-325 MG per tablet Take 1 tablet by mouth 3 times daily as needed for Pain.   Julián Coleman MD   diphenhydrAMINE (BENADRYL) 25 MG tablet Take 25 mg by mouth nightly   Historical Provider, MD   acetaminophen (TYLENOL) 500 MG tablet Take 500 mg by mouth as needed for Pain  Historical Provider, MD       Allergies   Allergen Reactions    Latex     Prednisone Hives   ,   Past Medical History:   Diagnosis Date    (HFpEF) heart failure with preserved ejection fraction (HCC)     Chronic anemia     Chronic pain     right hip and knee, pain management, Ligia Menjivar PA-C [place]    Essential hypertension     GERD (gastroesophageal reflux disease)     H/O percutaneous left heart catheterization 12/13/2016    False positive. No CAD    History of nuclear stress test 12/06/2016    lexiscan-mild ischemia mid inferior,EF70%    Hx of echocardiogram 06/26/2017    EF 55-60%. Grade 1 diastolic dysfunction.     Hyperhomocysteinemia (HCC)     Moderate depressive disorder     Morbid obesity ([address]     Neuropathy     Normocytic anemia     Orthostasis     Osteoarthritis     Sleep apnea     Spinal stenosis of lumbar region    ,   Past Surgical History:   Procedure Laterality Date    CATARACT REMOVAL  2008    CHOLECYSTECTOMY      EYE SURGERY      cataracts    GASTRIC BYPASS SURGERY  2010    HYSTERECTOMY      JOINT REPLACEMENT Left 09/17/2018    [facility]    JOINT REPLACEMENT Right 2003    Dr. Ken Valdovinos  2006    total lt shoulder     SHOULDER SURGERY  2006    total rt shoulder    TOTAL KNEE ARTHROPLASTY Right     TOTAL KNEE ARTHROPLASTY  02/2011    left   ,   Immunization History   Administered Date(s) Administered    Influenza Vaccine, unspecified formulation 11/05/2010, 09/15/2011, 10/03/2017    Influenza Virus Vaccine 11/05/2010, 09/15/2011, 12/07/2015, 10/03/2017    Influenza, High Dose (Fluzone 65 yrs and older) 06/19/2013, 09/22/2014, 09/24/2015, 10/11/2016, 09/06/2017, 09/20/2018, 10/01/2018    Influenza, Quadv, adjuvanted, 65 yrs +, IM, PF (Fluad) 11/02/2020    Influenza, Triv, inactivated, subunit, adjuvanted, IM (Fluad 65 yrs and older) 10/30/2019    Pneumococcal Conjugate 13-valent ([id number]) 12/13/2015    Pneumococcal Polysaccharide ([id number]) 05/02/2016    Tdap (Boostrix, Adacel) 10/13/2017       PHYSICAL EXAM:  Physical Exam  Vitals reviewed. Constitutional:       Appearance: She is ill-appearing. HENT:      Head: Normocephalic and atraumatic. Right Ear: Tympanic membrane, ear canal and external ear normal.      Left Ear: Tympanic membrane, ear canal and external ear normal.      Nose: Nose normal.      Mouth/Throat:      Mouth: Mucous membranes are moist.      Pharynx: Oropharyngeal exudate present. Eyes:      Pupils: Pupils are equal, round, and reactive to light. Pulmonary:      Effort: Pulmonary effort is normal.      Breath sounds: Examination of the right-upper field reveals rhonchi. Examination of the right-middle field reveals rhonchi. Rhonchi present. Neurological:      General: No focal deficit present. Mental Status: She is alert and oriented to person, place, and time. Psychiatric:         Mood and Affect: Mood normal.         Behavior: Behavior normal.       Temp:  97.1  Heart Rate:  64    Pulse Ox:  95        ASSESSMENT/PLAN:  1. Viral URI  Pt is having dry cough, green \"snot\" per pt reports. She is still taking steroids from previous ED visit. Advised to continue taking those. - Covid-19 Ambulatory      FOLLOW-UP:  No follow-ups on file.     In addition to other information, the printed after visit summary provided to the patient includes:  [x] COVID-19 Self care instructions  [x] COVID-19 General patient information    RUSSELL Torres - CNP

## 2021-08-24 LAB — SARS-COV-2: NOT DETECTED

## 2021-08-27 DIAGNOSIS — M48.062 SPINAL STENOSIS OF LUMBAR REGION WITH NEUROGENIC CLAUDICATION: ICD-10-CM

## 2021-08-27 RX ORDER — CELECOXIB 200 MG/1
CAPSULE ORAL
Qty: 180 CAPSULE | Refills: 0 | Status: SHIPPED | OUTPATIENT
Start: 2021-08-27 | End: 2021-11-05

## 2021-08-30 ENCOUNTER — TELEPHONE (OUTPATIENT)
Dept: FAMILY MEDICINE CLINIC | Age: 76
End: 2021-08-30

## 2021-08-30 NOTE — TELEPHONE ENCOUNTER
As previously stated, we will not be doing any more pain management referrals for this patient. We have already placed three different referrals and as stated per telephone encounter 8/16/2021 the third referral was our LAST pain management referral for this patient. We will not be sending in any more.      Thanks,  Rosa

## 2021-08-30 NOTE — TELEPHONE ENCOUNTER
----- Message from Jed Saenz sent at 8/30/2021 12:52 PM EDT -----  Subject: Referral Request    QUESTIONS   Reason for referral request? Patient would like to be referred to Pain   Management in [facility] with Dr. Angelica Gallo. Has the physician seen you for this condition before? No   Preferred Specialist (if applicable)? Do you already have an appointment scheduled? No  Additional Information for Provider?   ---------------------------------------------------------------------------  --------------  CALL BACK INFO  What is the best way for the office to contact you? OK to leave message on   voicemail  Preferred Call Back Phone Number?  [phone]

## 2021-09-01 ENCOUNTER — TELEPHONE (OUTPATIENT)
Dept: FAMILY MEDICINE CLINIC | Age: 76
End: 2021-09-01

## 2021-09-01 NOTE — TELEPHONE ENCOUNTER
To Chani Mcdonnell--    Patient is returning your call----she was asking me to send a referral to another location other than [facility], but I look at the message Vinicius Vu had in place and did not send patient's request because of that.     Please call patient back at:  [phone] [facility] Phone)

## 2021-09-16 NOTE — TELEPHONE ENCOUNTER
----- Message from Ann Marie Clemons sent at 9/16/2021 12:52 PM EDT -----  Subject: Refill Request    QUESTIONS  Name of Medication? Other - lisinopril 10-12.5mg tablet  Patient-reported dosage and instructions? 1 tablet once daily  How many days do you have left? 0  Preferred Pharmacy? [id number]  Pharmacy phone number (if available)? [phone]  Additional Information for Provider? Patient is requesting a 90 day supply   if possible  ---------------------------------------------------------------------------  --------------  CALL BACK INFO  What is the best way for the office to contact you? OK to leave message on   voicemail  Preferred Call Back Phone Number?  [phone]

## 2021-09-16 NOTE — TELEPHONE ENCOUNTER
Patient reported that she was not taking this in May 2021, so we took it off her medication list at that time. Why does she want to restart it? What has her blood pressure been running at home?

## 2021-09-17 RX ORDER — LISINOPRIL AND HYDROCHLOROTHIAZIDE 12.5; 1 MG/1; MG/1
TABLET ORAL
Qty: 90 TABLET | OUTPATIENT
Start: 2021-09-17

## 2021-09-29 ENCOUNTER — HOSPITAL ENCOUNTER (INPATIENT)
Age: 76
LOS: 2 days | Discharge: HOME | DRG: 177 | End: 2021-10-01
Attending: EMERGENCY MEDICINE | Admitting: INTERNAL MEDICINE
Payer: MEDICARE

## 2021-09-29 ENCOUNTER — OFFICE VISIT (OUTPATIENT)
Dept: FAMILY MEDICINE CLINIC | Age: 76
End: 2021-09-29
Payer: MEDICARE

## 2021-09-29 ENCOUNTER — APPOINTMENT (OUTPATIENT)
Dept: GENERAL RADIOLOGY | Age: 76
DRG: 177 | End: 2021-09-29
Payer: MEDICARE

## 2021-09-29 VITALS — TEMPERATURE: 97.2 F | HEART RATE: 72 BPM | RESPIRATION RATE: 26 BRPM | OXYGEN SATURATION: 90 %

## 2021-09-29 DIAGNOSIS — R09.02 HYPOXIA: ICD-10-CM

## 2021-09-29 DIAGNOSIS — U07.1 COVID-19: Primary | ICD-10-CM

## 2021-09-29 DIAGNOSIS — R05.9 COUGH: Primary | ICD-10-CM

## 2021-09-29 DIAGNOSIS — U07.1 PNEUMONIA DUE TO COVID-19 VIRUS: ICD-10-CM

## 2021-09-29 DIAGNOSIS — R05.9 COUGH: ICD-10-CM

## 2021-09-29 DIAGNOSIS — J12.82 PNEUMONIA DUE TO COVID-19 VIRUS: ICD-10-CM

## 2021-09-29 DIAGNOSIS — F41.1 GAD (GENERALIZED ANXIETY DISORDER): ICD-10-CM

## 2021-09-29 LAB
ALBUMIN SERPL-MCNC: 4 GM/DL (ref 3.4–5)
ALP BLD-CCNC: 122 IU/L (ref 40–129)
ALT SERPL-CCNC: 33 U/L (ref 10–40)
ANION GAP SERPL CALCULATED.3IONS-SCNC: 15 MMOL/L (ref 4–16)
AST SERPL-CCNC: 63 IU/L (ref 15–37)
BASE EXCESS: 2 (ref 0–2.4)
BASOPHILS ABSOLUTE: 0 K/CU MM
BASOPHILS RELATIVE PERCENT: 0.2 % (ref 0–1)
BILIRUB SERPL-MCNC: 0.6 MG/DL (ref 0–1)
BUN BLDV-MCNC: 19 MG/DL (ref 6–23)
CALCIUM SERPL-MCNC: 8.7 MG/DL (ref 8.3–10.6)
CHLORIDE BLD-SCNC: 96 MMOL/L (ref 99–110)
CO2: 20 MMOL/L (ref 21–32)
COMMENT: ABNORMAL
CREAT SERPL-MCNC: 1 MG/DL (ref 0.6–1.1)
D DIMER: 525 NG/ML(DDU)
DIFFERENTIAL TYPE: ABNORMAL
EOSINOPHILS ABSOLUTE: 0 K/CU MM
EOSINOPHILS RELATIVE PERCENT: 0 % (ref 0–3)
FERRITIN: 349 NG/ML (ref 15–150)
GFR AFRICAN AMERICAN: >60 ML/MIN/1.73M2
GFR NON-AFRICAN AMERICAN: 54 ML/MIN/1.73M2
GLUCOSE BLD-MCNC: 120 MG/DL (ref 70–99)
GLUCOSE BLD-MCNC: 215 MG/DL (ref 70–99)
HCO3 VENOUS: 21.1 MMOL/L (ref 19–25)
HCT VFR BLD CALC: 40.8 % (ref 37–47)
HEMOGLOBIN: 12.8 GM/DL (ref 12.5–16)
HIGH SENSITIVE C-REACTIVE PROTEIN: 149.9 MG/L
IMMATURE NEUTROPHIL %: 0.5 % (ref 0–0.43)
LACTATE DEHYDROGENASE: 385 IU/L (ref 120–246)
LACTATE: 0.7 MMOL/L (ref 0.4–2)
LYMPHOCYTES ABSOLUTE: 1.4 K/CU MM
LYMPHOCYTES RELATIVE PERCENT: 24.1 % (ref 24–44)
MCH RBC QN AUTO: 27.2 PG (ref 27–31)
MCHC RBC AUTO-ENTMCNC: 31.4 % (ref 32–36)
MCV RBC AUTO: 86.6 FL (ref 78–100)
MONOCYTES ABSOLUTE: 0.5 K/CU MM
MONOCYTES RELATIVE PERCENT: 9 % (ref 0–4)
NUCLEATED RBC %: 0 %
O2 SAT, VEN: 60.9 % (ref 50–70)
PCO2, VEN: 31 MMHG (ref 38–52)
PDW BLD-RTO: 13.4 % (ref 11.7–14.9)
PH VENOUS: 7.44 (ref 7.32–7.42)
PLATELET # BLD: 238 K/CU MM (ref 140–440)
PMV BLD AUTO: 9.6 FL (ref 7.5–11.1)
PO2, VEN: 29 MMHG (ref 28–48)
POTASSIUM SERPL-SCNC: 4.1 MMOL/L (ref 3.5–5.1)
PRO-BNP: 193.3 PG/ML
PROCALCITONIN: 0.25
RBC # BLD: 4.71 M/CU MM (ref 4.2–5.4)
SARS-COV-2, NAAT: DETECTED
SEGMENTED NEUTROPHILS ABSOLUTE COUNT: 3.9 K/CU MM
SEGMENTED NEUTROPHILS RELATIVE PERCENT: 66.2 % (ref 36–66)
SODIUM BLD-SCNC: 131 MMOL/L (ref 135–145)
SOURCE: ABNORMAL
TOTAL IMMATURE NEUTOROPHIL: 0.03 K/CU MM
TOTAL NUCLEATED RBC: 0 K/CU MM
TOTAL PROTEIN: 7.4 GM/DL (ref 6.4–8.2)
WBC # BLD: 5.9 K/CU MM (ref 4–10.5)

## 2021-09-29 PROCEDURE — 82805 BLOOD GASES W/O2 SATURATION: CPT

## 2021-09-29 PROCEDURE — 6360000002 HC RX W HCPCS: Performed by: EMERGENCY MEDICINE

## 2021-09-29 PROCEDURE — 6360000002 HC RX W HCPCS: Performed by: NURSE PRACTITIONER

## 2021-09-29 PROCEDURE — 86141 C-REACTIVE PROTEIN HS: CPT

## 2021-09-29 PROCEDURE — 99285 EMERGENCY DEPT VISIT HI MDM: CPT

## 2021-09-29 PROCEDURE — G8417 CALC BMI ABV UP PARAM F/U: HCPCS | Performed by: NURSE PRACTITIONER

## 2021-09-29 PROCEDURE — 4040F PNEUMOC VAC/ADMIN/RCVD: CPT | Performed by: NURSE PRACTITIONER

## 2021-09-29 PROCEDURE — 82962 GLUCOSE BLOOD TEST: CPT

## 2021-09-29 PROCEDURE — 85025 COMPLETE CBC W/AUTO DIFF WBC: CPT

## 2021-09-29 PROCEDURE — 83605 ASSAY OF LACTIC ACID: CPT

## 2021-09-29 PROCEDURE — 83615 LACTATE (LD) (LDH) ENZYME: CPT

## 2021-09-29 PROCEDURE — 1123F ACP DISCUSS/DSCN MKR DOCD: CPT | Performed by: NURSE PRACTITIONER

## 2021-09-29 PROCEDURE — 96374 THER/PROPH/DIAG INJ IV PUSH: CPT

## 2021-09-29 PROCEDURE — 71045 X-RAY EXAM CHEST 1 VIEW: CPT

## 2021-09-29 PROCEDURE — G8400 PT W/DXA NO RESULTS DOC: HCPCS | Performed by: NURSE PRACTITIONER

## 2021-09-29 PROCEDURE — 2580000003 HC RX 258: Performed by: EMERGENCY MEDICINE

## 2021-09-29 PROCEDURE — 84145 PROCALCITONIN (PCT): CPT

## 2021-09-29 PROCEDURE — 6370000000 HC RX 637 (ALT 250 FOR IP): Performed by: EMERGENCY MEDICINE

## 2021-09-29 PROCEDURE — G8428 CUR MEDS NOT DOCUMENT: HCPCS | Performed by: NURSE PRACTITIONER

## 2021-09-29 PROCEDURE — 1036F TOBACCO NON-USER: CPT | Performed by: NURSE PRACTITIONER

## 2021-09-29 PROCEDURE — 1200000000 HC SEMI PRIVATE

## 2021-09-29 PROCEDURE — 6370000000 HC RX 637 (ALT 250 FOR IP): Performed by: NURSE PRACTITIONER

## 2021-09-29 PROCEDURE — 93005 ELECTROCARDIOGRAM TRACING: CPT | Performed by: EMERGENCY MEDICINE

## 2021-09-29 PROCEDURE — 2580000003 HC RX 258: Performed by: NURSE PRACTITIONER

## 2021-09-29 PROCEDURE — 1090F PRES/ABSN URINE INCON ASSESS: CPT | Performed by: NURSE PRACTITIONER

## 2021-09-29 PROCEDURE — 87635 SARS-COV-2 COVID-19 AMP PRB: CPT

## 2021-09-29 PROCEDURE — 82728 ASSAY OF FERRITIN: CPT

## 2021-09-29 PROCEDURE — 87040 BLOOD CULTURE FOR BACTERIA: CPT

## 2021-09-29 PROCEDURE — 83880 ASSAY OF NATRIURETIC PEPTIDE: CPT

## 2021-09-29 PROCEDURE — 99213 OFFICE O/P EST LOW 20 MIN: CPT | Performed by: NURSE PRACTITIONER

## 2021-09-29 PROCEDURE — 85379 FIBRIN DEGRADATION QUANT: CPT

## 2021-09-29 PROCEDURE — 80053 COMPREHEN METABOLIC PANEL: CPT

## 2021-09-29 RX ORDER — DEXAMETHASONE SODIUM PHOSPHATE 10 MG/ML
10 INJECTION, SOLUTION INTRAMUSCULAR; INTRAVENOUS ONCE
Status: COMPLETED | OUTPATIENT
Start: 2021-09-29 | End: 2021-09-29

## 2021-09-29 RX ORDER — CELECOXIB 200 MG/1
200 CAPSULE ORAL DAILY
Status: DISCONTINUED | OUTPATIENT
Start: 2021-09-30 | End: 2021-10-01 | Stop reason: HOSPADM

## 2021-09-29 RX ORDER — HYDROXYZINE HYDROCHLORIDE 25 MG/1
25 TABLET, FILM COATED ORAL 3 TIMES DAILY PRN
Status: DISCONTINUED | OUTPATIENT
Start: 2021-09-29 | End: 2021-10-01 | Stop reason: HOSPADM

## 2021-09-29 RX ORDER — SODIUM CHLORIDE 0.9 % (FLUSH) 0.9 %
5-40 SYRINGE (ML) INJECTION EVERY 12 HOURS SCHEDULED
Status: DISCONTINUED | OUTPATIENT
Start: 2021-09-29 | End: 2021-10-01 | Stop reason: HOSPADM

## 2021-09-29 RX ORDER — ACETAMINOPHEN 325 MG/1
650 TABLET ORAL EVERY 6 HOURS PRN
Status: DISCONTINUED | OUTPATIENT
Start: 2021-09-29 | End: 2021-10-01 | Stop reason: HOSPADM

## 2021-09-29 RX ORDER — BACLOFEN 10 MG/1
10 TABLET ORAL 3 TIMES DAILY PRN
Status: DISCONTINUED | OUTPATIENT
Start: 2021-09-29 | End: 2021-10-01 | Stop reason: HOSPADM

## 2021-09-29 RX ORDER — IPRATROPIUM BROMIDE AND ALBUTEROL SULFATE 2.5; .5 MG/3ML; MG/3ML
1 SOLUTION RESPIRATORY (INHALATION)
Status: DISCONTINUED | OUTPATIENT
Start: 2021-09-30 | End: 2021-09-29

## 2021-09-29 RX ORDER — DEXAMETHASONE SODIUM PHOSPHATE 10 MG/ML
6 INJECTION, SOLUTION INTRAMUSCULAR; INTRAVENOUS EVERY 24 HOURS
Status: DISCONTINUED | OUTPATIENT
Start: 2021-09-30 | End: 2021-10-01 | Stop reason: HOSPADM

## 2021-09-29 RX ORDER — PANTOPRAZOLE SODIUM 40 MG/1
40 TABLET, DELAYED RELEASE ORAL
Status: DISCONTINUED | OUTPATIENT
Start: 2021-09-30 | End: 2021-10-01 | Stop reason: HOSPADM

## 2021-09-29 RX ORDER — GUAIFENESIN/DEXTROMETHORPHAN 100-10MG/5
5 SYRUP ORAL EVERY 4 HOURS PRN
Status: DISCONTINUED | OUTPATIENT
Start: 2021-09-29 | End: 2021-10-01 | Stop reason: HOSPADM

## 2021-09-29 RX ORDER — ACETAMINOPHEN 650 MG/1
650 SUPPOSITORY RECTAL EVERY 6 HOURS PRN
Status: DISCONTINUED | OUTPATIENT
Start: 2021-09-29 | End: 2021-10-01 | Stop reason: HOSPADM

## 2021-09-29 RX ORDER — SERTRALINE HYDROCHLORIDE 100 MG/1
100 TABLET, FILM COATED ORAL DAILY
Status: DISCONTINUED | OUTPATIENT
Start: 2021-09-30 | End: 2021-10-01 | Stop reason: HOSPADM

## 2021-09-29 RX ORDER — SODIUM CHLORIDE 9 MG/ML
INJECTION, SOLUTION INTRAVENOUS CONTINUOUS
Status: DISCONTINUED | OUTPATIENT
Start: 2021-09-29 | End: 2021-09-30

## 2021-09-29 RX ORDER — POLYETHYLENE GLYCOL 3350 17 G/17G
17 POWDER, FOR SOLUTION ORAL DAILY PRN
Status: DISCONTINUED | OUTPATIENT
Start: 2021-09-29 | End: 2021-10-01 | Stop reason: HOSPADM

## 2021-09-29 RX ORDER — SODIUM CHLORIDE 9 MG/ML
25 INJECTION, SOLUTION INTRAVENOUS PRN
Status: DISCONTINUED | OUTPATIENT
Start: 2021-09-29 | End: 2021-10-01 | Stop reason: HOSPADM

## 2021-09-29 RX ORDER — AMITRIPTYLINE HYDROCHLORIDE 25 MG/1
25 TABLET, FILM COATED ORAL NIGHTLY
Status: DISCONTINUED | OUTPATIENT
Start: 2021-09-29 | End: 2021-10-01 | Stop reason: HOSPADM

## 2021-09-29 RX ORDER — 0.9 % SODIUM CHLORIDE 0.9 %
1000 INTRAVENOUS SOLUTION INTRAVENOUS ONCE
Status: COMPLETED | OUTPATIENT
Start: 2021-09-29 | End: 2021-09-29

## 2021-09-29 RX ORDER — VITAMIN B COMPLEX
2000 TABLET ORAL DAILY
Status: DISCONTINUED | OUTPATIENT
Start: 2021-09-29 | End: 2021-10-01 | Stop reason: HOSPADM

## 2021-09-29 RX ORDER — GUAIFENESIN 100 MG/5ML
200 SOLUTION ORAL ONCE
Status: COMPLETED | OUTPATIENT
Start: 2021-09-29 | End: 2021-09-29

## 2021-09-29 RX ORDER — SODIUM CHLORIDE 0.9 % (FLUSH) 0.9 %
5-40 SYRINGE (ML) INJECTION PRN
Status: DISCONTINUED | OUTPATIENT
Start: 2021-09-29 | End: 2021-10-01 | Stop reason: HOSPADM

## 2021-09-29 RX ORDER — ACETAMINOPHEN 500 MG
500 TABLET ORAL EVERY 6 HOURS PRN
Status: DISCONTINUED | OUTPATIENT
Start: 2021-09-29 | End: 2021-09-29

## 2021-09-29 RX ORDER — ALBUTEROL SULFATE 90 UG/1
2 AEROSOL, METERED RESPIRATORY (INHALATION) EVERY 4 HOURS PRN
Status: DISCONTINUED | OUTPATIENT
Start: 2021-09-29 | End: 2021-10-01 | Stop reason: HOSPADM

## 2021-09-29 RX ORDER — ONDANSETRON 2 MG/ML
4 INJECTION INTRAMUSCULAR; INTRAVENOUS EVERY 6 HOURS PRN
Status: DISCONTINUED | OUTPATIENT
Start: 2021-09-29 | End: 2021-10-01 | Stop reason: HOSPADM

## 2021-09-29 RX ORDER — ONDANSETRON 4 MG/1
4 TABLET, ORALLY DISINTEGRATING ORAL EVERY 8 HOURS PRN
Status: DISCONTINUED | OUTPATIENT
Start: 2021-09-29 | End: 2021-10-01 | Stop reason: HOSPADM

## 2021-09-29 RX ORDER — ALBUTEROL SULFATE 90 UG/1
2 AEROSOL, METERED RESPIRATORY (INHALATION)
Status: DISCONTINUED | OUTPATIENT
Start: 2021-09-30 | End: 2021-10-01 | Stop reason: HOSPADM

## 2021-09-29 RX ADMIN — DEXAMETHASONE SODIUM PHOSPHATE 10 MG: 10 INJECTION, SOLUTION INTRAMUSCULAR; INTRAVENOUS at 17:58

## 2021-09-29 RX ADMIN — GUAIFENESIN 200 MG: 200 SOLUTION ORAL at 17:59

## 2021-09-29 RX ADMIN — AMITRIPTYLINE HYDROCHLORIDE 25 MG: 25 TABLET, FILM COATED ORAL at 22:34

## 2021-09-29 RX ADMIN — SODIUM CHLORIDE: 9 INJECTION, SOLUTION INTRAVENOUS at 22:33

## 2021-09-29 RX ADMIN — SODIUM CHLORIDE 1000 ML: 9 INJECTION, SOLUTION INTRAVENOUS at 20:46

## 2021-09-29 RX ADMIN — Medication 2000 UNITS: at 23:13

## 2021-09-29 RX ADMIN — CEFTRIAXONE SODIUM 1000 MG: 1 INJECTION, POWDER, FOR SOLUTION INTRAMUSCULAR; INTRAVENOUS at 20:48

## 2021-09-29 RX ADMIN — INSULIN LISPRO 2 UNITS: 100 INJECTION, SOLUTION INTRAVENOUS; SUBCUTANEOUS at 22:44

## 2021-09-29 RX ADMIN — AZITHROMYCIN MONOHYDRATE 500 MG: 500 INJECTION, POWDER, LYOPHILIZED, FOR SOLUTION INTRAVENOUS at 21:39

## 2021-09-29 RX ADMIN — HYDROXYZINE HYDROCHLORIDE 25 MG: 25 TABLET, FILM COATED ORAL at 22:34

## 2021-09-29 RX ADMIN — BACLOFEN 10 MG: 10 TABLET ORAL at 22:34

## 2021-09-29 RX ADMIN — GUAIFENESIN SYRUP AND DEXTROMETHORPHAN 5 ML: 100; 10 SYRUP ORAL at 22:34

## 2021-09-29 RX ADMIN — ENOXAPARIN SODIUM 40 MG: 40 INJECTION SUBCUTANEOUS at 22:35

## 2021-09-29 ASSESSMENT — ENCOUNTER SYMPTOMS
CHEST TIGHTNESS: 0
EYES NEGATIVE: 1
ABDOMINAL PAIN: 0
CONSTIPATION: 0
NAUSEA: 0
SORE THROAT: 0
COUGH: 1
SHORTNESS OF BREATH: 1
VOMITING: 0

## 2021-09-29 ASSESSMENT — PAIN SCALES - GENERAL: PAINLEVEL_OUTOF10: 0

## 2021-09-29 NOTE — ED PROVIDER NOTES
Triage Chief Complaint:   Shortness of Breath and Cough      [place]:  Chase Holloway is a 68 y.o. female that presents to the emergency department with a constant cough for the last 3 to 4 days. No fevers or chills. States she has pain across her chest with coughing only. No leg edema. She is not vaccinated for Covid. Denies any known Covid exposure. She does not have a history of asthma or COPD. She denies that she wears oxygen at home. Per EMS sats have been in the 90s ranging from 91 to 95% on room air. .    Past Medical History:   Diagnosis Date    (HFpEF) heart failure with preserved ejection fraction (HCC)     Chronic anemia     Chronic pain     right hip and knee, pain management, Ligia Menjivar PA-C [place]    Essential hypertension     GERD (gastroesophageal reflux disease)     H/O percutaneous left heart catheterization 12/13/2016    False positive. No CAD    History of nuclear stress test 12/06/2016    lexiscan-mild ischemia mid inferior,EF70%    Hx of echocardiogram 06/26/2017    EF 55-60%. Grade 1 diastolic dysfunction.     Hyperhomocysteinemia (HCC)     Moderate depressive disorder     Morbid obesity (HCC)     Neuropathy     Normocytic anemia     Orthostasis     Osteoarthritis     Sleep apnea     Spinal stenosis of lumbar region      Past Surgical History:   Procedure Laterality Date    CATARACT REMOVAL  2008    CHOLECYSTECTOMY      EYE SURGERY      cataracts    GASTRIC BYPASS SURGERY  2010    HYSTERECTOMY      JOINT REPLACEMENT Left 09/17/2018    Mark Raritan Right 2003    Dr. Deon Rooney  2006    total lt shoulder     SHOULDER SURGERY  2006    total rt shoulder    TOTAL KNEE ARTHROPLASTY Right     TOTAL KNEE ARTHROPLASTY  02/2011    left     Family History   Problem Relation Age of Onset    Cancer Mother     High Blood Pressure Mother     Stroke Mother     Heart Disease Father     High Blood Pressure Father     Cancer Maternal Aunt     Diabetes type 2  Daughter     Coronary Art Dis Daughter     Obesity Daughter     COPD Daughter     No Known Problems Daughter     No Known Problems Son     No Known Problems Son     No Known Problems Son     No Known Problems Son     No Known Problems Son     No Known Problems Son      Social History     Socioeconomic History    Marital status:      Spouse name: Not on file    Number of children: 6    Years of education: Not on file    Highest education level: Not on file   Occupational History    Occupation: management     Comment: /retired   Tobacco Use    Smoking status: Never Smoker    Smokeless tobacco: Never Used   Vaping Use    Vaping Use: Never used   Substance and Sexual Activity    Alcohol use: No    Drug use: No    Sexual activity: Not Currently     Partners: Male   Other Topics Concern    Not on file   Social History Narrative    Not on file     Social Determinants of Health     Financial Resource Strain:     Difficulty of Paying Living Expenses:    Food Insecurity:     Worried About Running Out of Food in the Last Year:     [address] in the Last Year:    Transportation Needs:     Lack of Transportation (Medical):      Lack of Transportation (Non-Medical):    Physical Activity:     Days of Exercise per Week:     Minutes of Exercise per Session:    Stress:     Feeling of Stress :    Social Connections:     Frequency of Communication with Friends and Family:     Frequency of Social Gatherings with Friends and Family:     Attends Voodoo Services:     Active Member of Clubs or Organizations:     Attends Club or Organization Meetings:     Marital Status:    Intimate Partner Violence:     Fear of Current or Ex-Partner:     Emotionally Abused:     Physically Abused:     Sexually Abused:      Current Facility-Administered Medications   Medication Dose Route Frequency Provider Last Rate Last Admin    azithromycin (ZITHROMAX) 500 mg in dextrose 5 % 250 mL IVPB  500 mg IntraVENous Q24H Kenia Godoy MD        And    cefTRIAXone (ROCEPHIN) 1000 mg IVPB in 50 mL D5W minibag  1,000 mg IntraVENous Q24H Kenia Godoy MD        0.9 % sodium chloride bolus  1,000 mL IntraVENous Once Kenia Godoy MD         Current Outpatient Medications   Medication Sig Dispense Refill    Baclofen (LIORESAL) 5 MG tablet Take 1 or 2 tablets by mouth up to three times daily as needed for back pain. 60 tablet 1    hydrOXYzine (ATARAX) 25 MG tablet Take 1 tablet by mouth twice daily as needed for anxiety 60 tablet 2    celecoxib (CELEBREX) 200 MG capsule TAKE 1 CAPSULE BY MOUTH TWICE DAILY 180 capsule 0    albuterol sulfate HFA (VENTOLIN HFA) 108 (90 Base) MCG/ACT inhaler Inhale 2 puffs into the lungs 4 times daily as needed for Wheezing or Shortness of Breath 3 Inhaler 0    cyclobenzaprine (FLEXERIL) 10 MG tablet Take 1 tablet by mouth 3 times daily as needed for Muscle spasms 15 tablet 0    omeprazole (PRILOSEC) 20 MG delayed release capsule TAKE 1 CAPSULE BY MOUTH TWICE DAILY 180 capsule 0    sertraline (ZOLOFT) 100 MG tablet Take 1 tablet by mouth daily 90 tablet 1    amitriptyline (ELAVIL) 25 MG tablet TAKE 1 TABLET BY MOUTH EVERY DAY AT BEDTIME 90 tablet 0    acetaminophen (TYLENOL) 500 MG tablet Take 500 mg by mouth as needed for Pain       Allergies   Allergen Reactions    Latex     Prednisone Hives     Nursing Notes Reviewed    ROS:  At least 10 systems reviewed and otherwise negative except as in the [place]. Physical Exam:  ED Triage Vitals [09/29/21 1703]   Enc Vitals Group      BP       Pulse 81      Resp 20      Temp 98.8 °F (37.1 °C)      Temp Source Oral      SpO2 92 %      Weight 217 lb (98.4 kg)      Height 5' 2\" (1.575 m)      Head Circumference       Peak Flow       Pain Score       Pain Loc       Pain Edu? Excl. in [address]?       My pulse oximetry interpretation is which is abnormal    GENERAL APPEARANCE: Awake and alert. Cooperative. Patient with constant dry cough. HEAD: Normocephalic. Atraumatic. EYES: EOM's grossly intact. Sclera anicteric. ENT: Mucous membranes are moist. Tolerates saliva. No trismus. NECK: Supple. No meningismus. Trachea midline. HEART: RRR. Radial pulses 2+. LUNGS: Respirations unlabored. CTAB. No wheezing or stridor. Constant dry cough  ABDOMEN: Soft. Non-tender. No guarding or rebound. EXTREMITIES: No acute deformities. No pitting edema  SKIN: Warm and dry. NEUROLOGICAL: No gross facial drooping. Moves all 4 extremities spontaneously. PSYCHIATRIC: Normal mood.     I have reviewed and interpreted all of the currently available lab results from this visit (if applicable):  Results for orders placed or performed during the hospital encounter of 09/29/21   COVID-19, Rapid    Specimen: Nasopharyngeal   Result Value Ref Range    Source UNKNOWN     SARS-CoV-2, NAAT DETECTED (A) NOT DETECTED   CBC with Auto Diff   Result Value Ref Range    WBC 5.9 4.0 - 10.5 K/CU MM    RBC 4.71 4.2 - 5.4 M/CU MM    Hemoglobin 12.8 12.5 - 16.0 GM/DL    Hematocrit 40.8 37 - 47 %    MCV 86.6 78 - 100 FL    MCH 27.2 27 - 31 PG    MCHC 31.4 (L) 32.0 - 36.0 %    RDW 13.4 11.7 - 14.9 %    Platelets 823 008 - 152 K/CU MM    MPV 9.6 7.5 - 11.1 FL    Differential Type AUTOMATED DIFFERENTIAL     Segs Relative 66.2 (H) 36 - 66 %    Lymphocytes % 24.1 24 - 44 %    Monocytes % 9.0 (H) 0 - 4 %    Eosinophils % 0.0 0 - 3 %    Basophils % 0.2 0 - 1 %    Segs Absolute 3.9 K/CU MM    Lymphocytes Absolute 1.4 K/CU MM    Monocytes Absolute 0.5 K/CU MM    Eosinophils Absolute 0.0 K/CU MM    Basophils Absolute 0.0 K/CU MM    Nucleated RBC % 0.0 %    Total Nucleated RBC 0.0 K/CU MM    Total Immature Neutrophil 0.03 K/CU MM    Immature Neutrophil % 0.5 (H) 0 - 0.43 %   CMP   Result Value Ref Range    Sodium 131 (L) 135 - 145 MMOL/L    Potassium 4.1 3.5 - 5.1 MMOL/L    Chloride 96 (L) 99 - 110 mMol/L    CO2 20 (L) 21 - 32 MMOL/L    BUN 19 6 - 23 MG/DL    CREATININE 1.0 0.6 - 1.1 MG/DL    Glucose 120 (H) 70 - 99 MG/DL    Calcium 8.7 8.3 - 10.6 MG/DL    Albumin 4.0 3.4 - 5.0 GM/DL    Total Protein 7.4 6.4 - 8.2 GM/DL    Total Bilirubin 0.6 0.0 - 1.0 MG/DL    ALT 33 10 - 40 U/L    AST 63 (H) 15 - 37 IU/L    Alkaline Phosphatase 122 40 - 129 IU/L    GFR Non- 54 (L) >60 mL/min/1.73m2    GFR African American >60 >60 mL/min/1.73m2    Anion Gap 15 4 - 16   Brain Natriuretic Peptide   Result Value Ref Range    Pro-.3 <300 PG/ML   Blood Gas, Venous   Result Value Ref Range    pH, [place] 7.44 (H) 7.32 - 7.42    pCO2, [place] 31 (L) 38 - 52 mmHG    pO2, [place] 29 28 - 48 mmHG    Base Excess 2 0 - 2.4    HCO3, Venous 21.1 19 - 25 MMOL/L    O2 Sat, [place] 60.9 50 - 70 %    Comment VBG    Lactic acid, plasma   Result Value Ref Range    Lactate 0.7 0.4 - 2.0 mMOL/L   EKG 12 Lead   Result Value Ref Range    Ventricular Rate 75 BPM    Atrial Rate 75 BPM    P-R Interval 128 ms    QRS Duration 86 ms    Q-T Interval 366 ms    QTc Calculation (Bazett) 408 ms    R Axis -15 degrees    T Axis 19 degrees    Diagnosis       Normal sinus rhythm  Low voltage QRS  Borderline ECG  When compared with ECG of 03-AUG-2018 05:31,  No significant change was found          Radiographs:  [] Radiologist's Wet Read Report Reviewed:      XR CHEST PORTABLE (Preliminary result)  Result time 09/29/21 17:49:01  Preliminary result by Barrie Davis MD (09/29/21 17:49:01)                Impression:    Patchy bilateral airspace changes in the lung bases consistent with   infiltrates.  Follow up to resolution is suggested. Narrative:    EXAMINATION:   ONE X-RAY VIEW OF THE CHEST     9/29/2021 5:32 pm     COMPARISON:   08/02/2018     HISTORY:   ORDERING SYSTEM PROVIDED HISTORY:  SOB, cough     TECHNOLOGIST PROVIDED HISTORY:     Reason for Exam:  SOB, cough     Reason for Exam:  SOB, cough     Acuity:  Acute     Type of Exam:  Initial     FINDINGS:   The trachea is midline.  The cardiac silhouette is unremarkable. Omelia Sovereign is   patchy airspace changes in the lung bases bilaterally suspicious for   infiltrates.  There is no pleural fluid.  Bilateral shoulder replacements.                 Preliminary result by Araseli Cox MD (09/29/21 17:46:00)                Impression:    Patchy bilateral airspace changes in the lung bases consistent with   infiltrates.  Follow up to resolution is suggested.                       [] Discussed with Radiologist:     [] The following radiograph was interpreted by myself in the absence of a radiologist:     EKG: (All EKG's are interpreted by myself in the absence of a cardiologist)  The Ekg interpreted by me shows  normal sinus rhythm with a rate of 75  Axis is   Normal  QTc is  normal  Intervals and Durations are unremarkable. ST Segments: no acute change  No significant change from prior EKG dated 8-2-2018        MDM:  Patient is normotensive. Afebrile. Heart rate in the 80s. Left her off oxygen and watched her sats for several minutes. Patient did drop down to 88 to 89% on room air. Patient was placed on 2 L of oxygen which did bring her sats up to 94%. Patient given Robitussin p.o., Decadron IV. Venous blood gas shows a PCO2 of 31. Venous pH of 7.33. EKG shows normal sinus rhythm without any acute findings. CBC shows a normal white count of 5.9. Hemoglobin of 12.8 CMP shows a sodium of 131. Decreased CO2 of 20. Normal anion gap of 15. BNP within normal limits. COVID test positive. Chest x-ray shows patchy bilateral airspace changes in the lung bases consistent with infiltrates. Started on Rocephin and azithromycin.   Lactic acid normal.  Patient needs to be admitted for hypoxia with Covid, Covid pneumonia, requiring oxygen she agrees to admit    CRITICAL CARE NOTE:  There was a high probability of clinically significant life-threatening deterioration of the patient's condition requiring my urgent intervention due to hypoxia, cough, Covid positive, Covid pneumonia. Patient placed on oxygen, Decadron IV, IV antibiotics, chart review, reeval, admit was performed to address this. Total critical care time is at least  40 minutes. This includes vital sign monitoring, pulse oximetry monitoring, telemetry monitoring, clinical response to the IV medications, reviewing the nursing notes, consultation time, dictation/documentation time, and interpretation of the lab work. This time excludes time spent performing procedures and separately billable procedures and family discussion time. Clinical Impression:  1. COVID-19    2. Pneumonia due to COVID-19 virus    3. Hypoxia    4. Cough        Disposition Vitals:  [unfilled], [unfilled], [unfilled], [unfilled]    Disposition referral (if applicable):  No follow-up provider specified.     Disposition medications (if applicable):  New Prescriptions    No medications on file         (Please note that portions of this note may have been completed with a voice recognition program. Efforts were made to edit the dictations but occasionally words are mis-transcribed.)    MD Rico Santa MD  09/29/21 Eileen Madison

## 2021-09-29 NOTE — PROGRESS NOTES
9/29/21  Jesus Reading  1945    FLU/COVID-19 CLINIC EVALUATION    HPI SYMPTOMS:    Employer:    [] Fevers  [] Chills  [x] Cough  [] Coughing up blood  [] Chest Congestion  [] Nasal Congestion  [x] Feeling short of breath  [] Sometimes  [] Frequently  [x] All the time  [x] Chest pain  [] Headaches  []Tolerable  [] Severe  [x] Sore throat  [] Muscle aches  [] Nausea  [] Vomiting  []Unable to keep fluids down  [] Diarrhea  []Severe    [] OTHER SYMPTOMS:      Symptom Duration:   [] 1  [x] 2   [] 3   [] 4    [] 5   [] 6   [] 7   [] 8   [] 9   [] 10   [] 11   [] 12   [] 13   [] 14   [] Longer than 14 days    Symptom course:   [] Worsening     [x] Stable     [] Improving    RISK FACTORS:    [] Pregnant or possibly pregnant  [x] Age over 61  [] Diabetes  [] Heart disease  [] Asthma  [] COPD/Other chronic lung diseases  [] Active Cancer  [] On Chemotherapy  [] Taking oral steroids  [] History Lymphoma/Leukemia  [] Close contact with a lab confirmed COVID-19 patient within 14 days of symptom onset  [] History of travel from affected geographical areas within 14 days of symptom onset       VITALS:  There were no vitals filed for this visit. TESTS:    POCT FLU:  [] Positive     []Negative    ASSESSMENT:    [] Flu  [] Possible COVID-19  [] Strep    PLAN:    [] Discharge home with written instructions for:  [] Flu management  [] Possible COVID-19 infection with self-quarantine and management of symptoms  [] Follow-up with primary care physician or emergency department if worsens  [] Evaluation per physician/NP/PA in clinic  [] Sent to ER       An  electronic signature was used to authenticate this note.      --Cam Pablo on 9/29/2021 at 3:49 PM

## 2021-09-29 NOTE — ED NOTES
Bed: ED-19  Expected date:   Expected time:   Means of arrival:   Comments:  EMS - probably [id number] Ashley Tay RN  09/29/21 [id number]

## 2021-09-29 NOTE — ED TRIAGE NOTES
Patient presents from [facility] with c/o shortness of breath, generalized weakness, and cough. Patient states her symptoms began aprox. One week ago.  Patient on 4L/ min O2 stat at 93%

## 2021-09-29 NOTE — PROGRESS NOTES
9/29/2021    HPI:  Chief complaint and history of present illness as per medical assistant/nurse documented today in the Flu/COVID-19 clinic. Here today with daughter with c/o non-stop coughing, sob, difficulty breathing, chest pain, headache, and sore throat. She arrives diaphoretic, in acute distress, unable to talk except in 2 word sentences, 02 sat 90% on RA. Wen Primmer charge  MEDICATIONS:  Prior to Visit Medications    Medication Sig Taking? Authorizing Provider   Baclofen (LIORESAL) 5 MG tablet Take 1 or 2 tablets by mouth up to three times daily as needed for back pain. Choco Hopson PA-C   hydrOXYzine (ATARAX) 25 MG tablet Take 1 tablet by mouth twice daily as needed for anxiety  Choco Hopson PA-C   celecoxib (CELEBREX) 200 MG capsule TAKE 1 CAPSULE BY MOUTH TWICE DAILY  Logan Mendoza PA-C   albuterol sulfate HFA (VENTOLIN HFA) 108 (90 Base) MCG/ACT inhaler Inhale 2 puffs into the lungs 4 times daily as needed for Wheezing or Shortness of Breath  Ya Ibarra, APRN - CNP   cyclobenzaprine (FLEXERIL) 10 MG tablet Take 1 tablet by mouth 3 times daily as needed for Muscle spasms  Brianne Ashton PA-C   omeprazole (PRILOSEC) 20 MG delayed release capsule TAKE 1 CAPSULE BY MOUTH TWICE DAILY  Logan Mendoza PA-C   sertraline (ZOLOFT) 100 MG tablet Take 1 tablet by mouth daily  Choco Hopson PA-C   amitriptyline (ELAVIL) 25 MG tablet TAKE 1 TABLET BY MOUTH EVERY DAY AT BEDTIME  Rekha Bazan MD   acetaminophen (TYLENOL) 500 MG tablet Take 500 mg by mouth as needed for Pain  Historical Provider, MD           PHYSICAL EXAM:  Physical Exam  Vitals and nursing note reviewed. Constitutional:       General: She is in acute distress. Appearance: Normal appearance. She is well-developed. She is obese. She is ill-appearing and diaphoretic. She is not toxic-appearing. HENT:      Head: Normocephalic and atraumatic.       Right Ear: Tympanic membrane, ear canal and external ear normal. Left Ear: Tympanic membrane, ear canal and external ear normal.      Nose: Congestion present. Mouth/Throat:      Mouth: Mucous membranes are moist.   Eyes:      General:         Right eye: No discharge. Left eye: No discharge. Extraocular Movements: Extraocular movements intact. Conjunctiva/sclera: Conjunctivae normal.      Pupils: Pupils are equal, round, and reactive to light. Neck:      Thyroid: No thyroid mass or thyromegaly. Trachea: Trachea normal.   Cardiovascular:      Rate and Rhythm: Normal rate and regular rhythm. Pulses: Normal pulses. Heart sounds: S1 normal and S2 normal.   Pulmonary:      Effort: Respiratory distress present. No accessory muscle usage. Breath sounds: No stridor. Decreased breath sounds present. No wheezing, rhonchi or rales. Comments: Non-stop coughing. She arrives diaphoretic, in acute distress, unable to talk except in 2 word sentences, 02 sat 90% on RA. Musculoskeletal:         General: Normal range of motion. Right shoulder: No swelling, deformity, tenderness, bony tenderness or crepitus. Normal range of motion. Cervical back: Full passive range of motion without pain, normal range of motion and neck supple. No rigidity. No muscular tenderness. Lymphadenopathy:      Cervical: No cervical adenopathy. Skin:     General: Skin is warm. Coloration: Skin is not pale. Findings: No erythema or rash. Nails: There is no clubbing. Neurological:      Mental Status: She is alert and oriented to person, place, and time. Psychiatric:         Mood and Affect: Mood normal.         Speech: Speech normal.         Behavior: Behavior normal.         Thought Content: Thought content normal.         Judgment: Judgment normal.       Pulse 72   Temp 97.2 °F (36.2 °C) (Infrared)   Resp 26   SpO2 90% Comment: RA     ASSESSMENT/PLAN:    1.  Cough  Discussed with daughter and patient that she needs to be evaluated and treated through the ED due to her distress. Sent to ED by EMS due to Here today with daughter with c/o non-stop coughing, sob, difficulty breathing, chest pain, diaphoretic, in acute distress, unable to talk except in 2 word sentences, 02 sat 90% on RA. Report given to Lucas Lopez RN    - Covid-19 Ambulatory  - Covid-19 Ambulatory      FOLLOW-UP:  Return if symptoms worsen or fail to improve, for Sent to ED by EMS.     In addition to other information, the printed after visit summary provided to the patient includes:  [x] COVID-19 Self care instructions  [x] COVID-19 General patient information

## 2021-09-29 NOTE — PATIENT INSTRUCTIONS
Your COVID 19 test can take 1-5 days for the results to come back. We ask that you make a Mychart page and view your test results this way. You will need to Self quarantine until you know your results. Increase fluids and rest  Saline nasal spray as needed for nasal congestion  Warm salt gargles as needed for throat discomfort  Monitor temperature twice a day  Tylenol as needed for fevers and/or discomfort. Big deep breaths periodically throughout the day  Regular Mucinex over the counter as needed for chest congestion  If symptoms worsen -Go to the ER. Follow up with your primary care provider      To Whom it May Concern:    Roseann Paredes was tested for COVID-19 9/29/2021. He/she must stay home until test results are back. If test is positive, he/she must quarantine for a total of 10 days starting from day one of symptom onset. He/she must also be fever-free for 24 hours at that time, and also have improvement in symptoms. We do not recommend retesting as patients may continue to test positive for months even though no longer contagious. It is suggested you call [address] or [address] [place] [address] with any questions regarding quarantine timeframe/return to work/school details.

## 2021-09-30 ENCOUNTER — TELEPHONE (OUTPATIENT)
Dept: FAMILY MEDICINE CLINIC | Age: 76
End: 2021-09-30

## 2021-09-30 LAB
D DIMER: 401 NG/ML(DDU)
EKG ATRIAL RATE: 75 BPM
EKG DIAGNOSIS: NORMAL
EKG P-R INTERVAL: 128 MS
EKG Q-T INTERVAL: 366 MS
EKG QRS DURATION: 86 MS
EKG QTC CALCULATION (BAZETT): 408 MS
EKG R AXIS: -15 DEGREES
EKG T AXIS: 19 DEGREES
EKG VENTRICULAR RATE: 75 BPM
FERRITIN: 303 NG/ML (ref 15–150)
GLUCOSE BLD-MCNC: 139 MG/DL (ref 70–99)
GLUCOSE BLD-MCNC: 149 MG/DL (ref 70–99)
GLUCOSE BLD-MCNC: 151 MG/DL (ref 70–99)
GLUCOSE BLD-MCNC: 191 MG/DL (ref 70–99)
GLUCOSE BLD-MCNC: 210 MG/DL (ref 70–99)
HIGH SENSITIVE C-REACTIVE PROTEIN: 121.1 MG/L
LACTATE DEHYDROGENASE: 322 IU/L (ref 120–246)
LACTATE: 0.8 MMOL/L (ref 0.4–2)
LEGIONELLA URINARY AG: NEGATIVE
PROCALCITONIN: 0.19
STREP PNEUMONIAE ANTIGEN: NORMAL

## 2021-09-30 PROCEDURE — 83605 ASSAY OF LACTIC ACID: CPT

## 2021-09-30 PROCEDURE — 6370000000 HC RX 637 (ALT 250 FOR IP): Performed by: NURSE PRACTITIONER

## 2021-09-30 PROCEDURE — 93010 ELECTROCARDIOGRAM REPORT: CPT | Performed by: INTERNAL MEDICINE

## 2021-09-30 PROCEDURE — 2700000000 HC OXYGEN THERAPY PER DAY

## 2021-09-30 PROCEDURE — 82962 GLUCOSE BLOOD TEST: CPT

## 2021-09-30 PROCEDURE — 1200000000 HC SEMI PRIVATE

## 2021-09-30 PROCEDURE — 85379 FIBRIN DEGRADATION QUANT: CPT

## 2021-09-30 PROCEDURE — 94640 AIRWAY INHALATION TREATMENT: CPT

## 2021-09-30 PROCEDURE — 36415 COLL VENOUS BLD VENIPUNCTURE: CPT

## 2021-09-30 PROCEDURE — 2580000003 HC RX 258: Performed by: NURSE PRACTITIONER

## 2021-09-30 PROCEDURE — 86141 C-REACTIVE PROTEIN HS: CPT

## 2021-09-30 PROCEDURE — 99222 1ST HOSP IP/OBS MODERATE 55: CPT | Performed by: INTERNAL MEDICINE

## 2021-09-30 PROCEDURE — 87899 AGENT NOS ASSAY W/OPTIC: CPT

## 2021-09-30 PROCEDURE — 6360000002 HC RX W HCPCS: Performed by: NURSE PRACTITIONER

## 2021-09-30 PROCEDURE — 87449 NOS EACH ORGANISM AG IA: CPT

## 2021-09-30 PROCEDURE — 83615 LACTATE (LD) (LDH) ENZYME: CPT

## 2021-09-30 PROCEDURE — 82728 ASSAY OF FERRITIN: CPT

## 2021-09-30 PROCEDURE — 99232 SBSQ HOSP IP/OBS MODERATE 35: CPT | Performed by: INTERNAL MEDICINE

## 2021-09-30 PROCEDURE — 94761 N-INVAS EAR/PLS OXIMETRY MLT: CPT

## 2021-09-30 PROCEDURE — 84145 PROCALCITONIN (PCT): CPT

## 2021-09-30 RX ADMIN — ENOXAPARIN SODIUM 40 MG: 40 INJECTION SUBCUTANEOUS at 08:12

## 2021-09-30 RX ADMIN — SERTRALINE HYDROCHLORIDE 100 MG: 100 TABLET ORAL at 08:12

## 2021-09-30 RX ADMIN — SODIUM CHLORIDE, PRESERVATIVE FREE 10 ML: 5 INJECTION INTRAVENOUS at 08:13

## 2021-09-30 RX ADMIN — HYDROXYZINE HYDROCHLORIDE 25 MG: 25 TABLET, FILM COATED ORAL at 21:09

## 2021-09-30 RX ADMIN — INSULIN LISPRO 1 UNITS: 100 INJECTION, SOLUTION INTRAVENOUS; SUBCUTANEOUS at 17:52

## 2021-09-30 RX ADMIN — Medication 2 PUFF: at 12:02

## 2021-09-30 RX ADMIN — Medication 2 PUFF: at 16:54

## 2021-09-30 RX ADMIN — ENOXAPARIN SODIUM 40 MG: 40 INJECTION SUBCUTANEOUS at 21:09

## 2021-09-30 RX ADMIN — SODIUM CHLORIDE: 9 INJECTION, SOLUTION INTRAVENOUS at 08:15

## 2021-09-30 RX ADMIN — PANTOPRAZOLE SODIUM 40 MG: 40 TABLET, DELAYED RELEASE ORAL at 05:54

## 2021-09-30 RX ADMIN — ALBUTEROL SULFATE 2 PUFF: 90 AEROSOL, METERED RESPIRATORY (INHALATION) at 16:55

## 2021-09-30 RX ADMIN — SODIUM CHLORIDE, PRESERVATIVE FREE 10 ML: 5 INJECTION INTRAVENOUS at 21:12

## 2021-09-30 RX ADMIN — AMITRIPTYLINE HYDROCHLORIDE 25 MG: 25 TABLET, FILM COATED ORAL at 21:09

## 2021-09-30 RX ADMIN — BACLOFEN 10 MG: 10 TABLET ORAL at 14:09

## 2021-09-30 RX ADMIN — ALBUTEROL SULFATE 2 PUFF: 90 AEROSOL, METERED RESPIRATORY (INHALATION) at 12:01

## 2021-09-30 RX ADMIN — DEXAMETHASONE SODIUM PHOSPHATE 6 MG: 10 INJECTION, SOLUTION INTRAMUSCULAR; INTRAVENOUS at 09:17

## 2021-09-30 RX ADMIN — CELECOXIB 200 MG: 200 CAPSULE ORAL at 08:12

## 2021-09-30 RX ADMIN — Medication 2000 UNITS: at 09:17

## 2021-09-30 RX ADMIN — INSULIN LISPRO 1 UNITS: 100 INJECTION, SOLUTION INTRAVENOUS; SUBCUTANEOUS at 08:16

## 2021-09-30 ASSESSMENT — PAIN SCALES - GENERAL
PAINLEVEL_OUTOF10: 0

## 2021-09-30 NOTE — TELEPHONE ENCOUNTER
To Luciana Calderon-    Reporting:     Patient was admitted to [facility] for [facility] yesterday.

## 2021-09-30 NOTE — CONSULTS
Pulmonary Consult Note      Reason for Consult: COVID, ? Barcitinib  Requesting Physician:RUSSELL Lewis  Subjective:   CHIEF COMPLAINT :SOB    Patient Active Problem List    Diagnosis Date Noted    Intercostal pain      Priority: High    Essential hypertension      Priority: High    Dyslipidemia      Priority: High    COVID-19 09/29/2021    Type 2 diabetes mellitus without complication, without long-term current use of insulin ([facility])     Sleep apnea     Moderate depressive disorder     Normocytic anemia     GERD (gastroesophageal reflux disease)     Spinal stenosis of lumbar region     Paresthesias in right hand 08/02/2018    Stroke-like symptoms 08/02/2018    Class 2 obesity in adult 08/02/2018    Chest pain with high risk of acute coronary syndrome 04/11/2018    Hyperhomocysteinemia ([facility] [address] 09/19/2017    Anxiety 09/16/2017    Anemia 09/16/2017    CKD (chronic kidney disease) stage 3, GFR 30-59 ml/min ([facility]) 09/16/2017    TIA (transient ischemic attack) 06/17/2017    Chronic left-sided low back pain with left-sided sciatica 05/31/2017    Primary osteoarthritis of left hip 05/31/2017    Morbid obesity with BMI of 40.0-44.9, adult ([facility] [address] 05/31/2017    Unstable angina ([facility] [address]     Arthritis 08/02/2016    Abdominal pain 09/07/2013     Overview Note:     Probable gastritis      Status post gastric bypass for obesity 09/07/2013    CHARLIE (generalized anxiety disorder) 09/07/2013        HPI:                The patient is a 68 y.o. female with significant past medical history of Morbid Obesity, ANITHA, HTN, GERD, Diastolic dysfunction  presents with complaints of SOB x 5 days. She has associated cough, little phlegm, no fever, no headache, no n/v, no abd pain, no diarrhea, no dysuria. Her CXR showed bilateral pneumonia. She has been tested positive for COVID-19. At this time she is on Decadron and Insulin.     Past Medical History:      Diagnosis Date    (HFpEF) heart failure with preserved ejection fraction (HCC)     Chronic anemia     Chronic pain     right hip and knee, pain management, Maddy Buckner PA-C [place]    COVID-19 09/29/2021    Essential hypertension     GERD (gastroesophageal reflux disease)     H/O percutaneous left heart catheterization 12/13/2016    False positive. No CAD    History of nuclear stress test 12/06/2016    lexiscan-mild ischemia mid inferior,EF70%    Hx of echocardiogram 06/26/2017    EF 55-60%. Grade 1 diastolic dysfunction.  Hyperhomocysteinemia (HCC)     Moderate depressive disorder     Morbid obesity (HCC)     Neuropathy     Normocytic anemia     Orthostasis     Osteoarthritis     Sleep apnea     Spinal stenosis of lumbar region       Past Surgical History:        Procedure Laterality Date    CATARACT REMOVAL  2008    CHOLECYSTECTOMY      EYE SURGERY      cataracts    GASTRIC BYPASS SURGERY  2010    HYSTERECTOMY      JOINT REPLACEMENT Left 09/17/2018    [place]    JOINT REPLACEMENT Right 2003    Dr. Allan Simth  2006    total lt shoulder     SHOULDER SURGERY  2006    total rt shoulder    TOTAL KNEE ARTHROPLASTY Right     TOTAL KNEE ARTHROPLASTY  02/2011    left     Current Medications:     sertraline  100 mg Oral Daily    pantoprazole  40 mg Oral QAM AC    amitriptyline  25 mg Oral Nightly    celecoxib  200 mg Oral Daily    sodium chloride flush  5-40 mL IntraVENous 2 times per day    enoxaparin  40 mg SubCUTAneous BID    dexamethasone  6 mg IntraVENous Q24H    Vitamin D  2,000 Units Oral Daily    insulin lispro  0-6 Units SubCUTAneous TID WC    insulin lispro  0-3 Units SubCUTAneous Nightly    albuterol sulfate HFA  2 puff Inhalation Q4H WA    And    ipratropium  2 puff Inhalation Q4H WA     Allergies:    Social History:    TOBACCO:   reports that she has never smoked. She has never used smokeless tobacco.  ETOH:   reports no history of alcohol use.   Patient currently lives independently    Family History: Problem Relation Age of Onset    Cancer Mother     High Blood Pressure Mother     Stroke Mother     Heart Disease Father     High Blood Pressure Father     Cancer Maternal Aunt     Diabetes type 2  Daughter     Coronary Art Dis Daughter     Obesity Daughter     COPD Daughter     No Known Problems Daughter     No Known Problems Son     No Known Problems Son     No Known Problems Son     No Known Problems Son     No Known Problems Son     No Known Problems Son        REVIEW OF SYSTEMS:    CONSTITUTIONAL:  negative for fevers, chills, diaphoresis, activity change, appetite change, fatigue, night sweats and unexpected weight change. EYES:  negative for blurred vision, eye discharge, visual disturbance and icterus  HEENT:  negative for hearing loss, tinnitus, ear drainage, sinus pressure, nasal congestion, epistaxis and snoring  RESPIRATORY:  See HPI  CARDIOVASCULAR:  negative for chest pain, palpitations, exertional chest pressure/discomfort, edema, syncope  GASTROINTESTINAL:  negative for nausea, vomiting, diarrhea, constipation, blood in stool and abdominal pain  GENITOURINARY:  negative for frequency, dysuria, urinary incontinence, decreased urine volume, and hematuria  HEMATOLOGIC/LYMPHATIC:  negative for easy bruising, bleeding and lymphadenopathy  ALLERGIC/IMMUNOLOGIC:  negative for recurrent infections, angioedema, anaphylaxis and drug reactions  ENDOCRINE:  negative for weight changes and diabetic symptoms including polyuria, polydipsia and polyphagia  MUSCULOSKELETAL:  negative for  pain, joint swelling, decreased range of motion and muscle weakness  NEUROLOGICAL:  negative for headaches, slurred speech, unilateral weakness  PSYCHIATRIC/BEHAVIORAL: negative for hallucinations, behavioral problems, confusion and agitation.      Objective:   PHYSICAL EXAM:      VITALS:  BP (!) 135/90   Pulse 66   Temp 98.2 °F (36.8 °C) (Oral)   Resp 13   Ht 5' 2\" (1.575 m)   Wt 225 lb 1.4 oz (102.1 kg)   SpO2 96%   BMI 41.17 kg/m²   24HR INTAKE/OUTPUT:      Intake/Output Summary (Last 24 hours) at 2021 1327  Last data filed at 2021 1103  Gross per 24 hour   Intake 2463.2 ml   Output --   Net 2463.2 ml     CURRENT PULSE OXIMETRY:  SpO2: 96 %  24HR PULSE OXIMETRY RANGE:  SpO2  Av.3 %  Min: 87 %  Max: 96 %    CONSTITUTIONAL:  awake, alert, cooperative, no apparent distress, and appears stated age  NECK:  Supple, symmetrical, trachea midline, no adenopathy, thyroid symmetric, not enlarged and no tenderness, skin normal  LUNGS:  Occasional basal crackles  CARDIOVASCULAR:  normal S1 and S2, no edema and no JVD  ABDOMEN:  normal bowel sounds, non-distended and no masses palpated, and no tenderness to palpation. No hepatospleenomegaly  LYMPHADENOPATHY:  no axillary or supraclavicular adenopathy. No cervical adnenopathy  PSYCHIATRIC: Oriented to person place and time. No obvious depression or anxiety. MUSCULOSKELETAL: No obvious misalignment or effusion of the joints. No clubbing, cyanosis of the digits. SKIN:  normal skin color, texture, turgor and no redness, warmth, or swelling.  No palpable nodules    DATA:    Old records have been reviewed  CBC with Differential:    Lab Results   Component Value Date    WBC 5.9 2021    RBC 4.71 2021    HGB 12.8 2021    HCT 40.8 2021     2021    MCV 86.6 2021    MCH 27.2 2021    MCHC 31.4 2021    RDW 13.4 2021    SEGSPCT 66.2 2021    LYMPHOPCT 24.1 2021    MONOPCT 9.0 2021    EOSPCT 1.0 2011    BASOPCT 0.2 2021    MONOSABS 0.5 2021    LYMPHSABS 1.4 2021    EOSABS 0.0 2021    BASOSABS 0.0 2021    DIFFTYPE AUTOMATED DIFFERENTIAL 2021     BMP:    Lab Results   Component Value Date     2021    K 4.1 2021    CL 96 2021    CO2 20 2021    BUN 19 2021    CREATININE 1.0 2021    CALCIUM 8.7 2021    GFRAA >60 09/29/2021    LABGLOM 54 09/29/2021    GLUCOSE 120 09/29/2021     Hepatic Function Panel:    Lab Results   Component Value Date    ALKPHOS 122 09/29/2021    ALT 33 09/29/2021    AST 63 09/29/2021    PROT 7.4 09/29/2021    PROT 7.0 12/08/2011    BILITOT 0.6 09/29/2021     ABG:  No results found for: YBJ4NKN, BEART, W2QBCWLJ, PHART, THGBART, BTL3NVX, PO2ART, VLG8BXC    Cultures:   Pending    Radiology Review:    Patchy bilateral airspace changes in the lung bases consistent with   infiltrates.  Follow up to resolution is suggested               Assessment/Plan       Patient Active Problem List    Diagnosis Date Noted    Intercostal pain      Priority: High    Essential hypertension      Priority: High    Dyslipidemia      Priority: High    COVID-19 09/29/2021    Type 2 diabetes mellitus without complication, without long-term current use of insulin ([facility])     Sleep apnea     Moderate depressive disorder     Normocytic anemia     GERD (gastroesophageal reflux disease)     Spinal stenosis of lumbar region     Paresthesias in right hand 08/02/2018    Stroke-like symptoms 08/02/2018    Class 2 obesity in adult 08/02/2018    Chest pain with high risk of acute coronary syndrome 04/11/2018    Hyperhomocysteinemia ([facility] [address] 09/19/2017    Anxiety 09/16/2017    Anemia 09/16/2017    CKD (chronic kidney disease) stage 3, GFR 30-59 ml/min ([facility]) 09/16/2017    TIA (transient ischemic attack) 06/17/2017    Chronic left-sided low back pain with left-sided sciatica 05/31/2017    Primary osteoarthritis of left hip 05/31/2017    Morbid obesity with BMI of 40.0-44.9, adult ([facility] [address] 05/31/2017    Unstable angina ([facility] [address]     Arthritis 08/02/2016    Abdominal pain 09/07/2013     Overview Note:     Probable gastritis      Status post gastric bypass for obesity 09/07/2013    CHARLIE (generalized anxiety disorder) 09/07/2013     Acute Hypoxic resp failure- slowly improving  Bilateral Pneumonia sec to COVID-19  Morbid Obesity  ANITHA  HTN      PLAN  1. Decadron  2. Insulin  3. BIPAP qhs and prn  4. Keep sats > 92%  5. CXR in am  6. No Need of Barcitinib at time as she is only of 2 L/min of oxygen  7. ICS  8. OOB  9. DVT and GI Prophylaxis  10.  C/w present management      Electronically signed by Aracelis Nair MD on 9/30/2021 at 1:27 PM

## 2021-09-30 NOTE — PLAN OF CARE
Problem: Falls - Risk of:  Goal: Will remain free from falls  Description: Will remain free from falls  Outcome: Met This Shift  Goal: Absence of physical injury  Description: Absence of physical injury  Outcome: Met This Shift     Problem: Airway Clearance - Ineffective  Goal: Achieve or maintain patent airway  Outcome: Met This Shift     Problem: Gas Exchange - Impaired  Goal: Absence of hypoxia  Outcome: Met This Shift  Goal: Promote optimal lung function  Outcome: Met This Shift     Problem: Breathing Pattern - Ineffective  Goal: Ability to achieve and maintain a regular respiratory rate  Outcome: Met This Shift     Problem:  Body Temperature -  Risk of, Imbalanced  Goal: Ability to maintain a body temperature within defined limits  Outcome: Met This Shift  Goal: Will regain or maintain usual level of consciousness  Outcome: Met This Shift  Goal: Complications related to the disease process, condition or treatment will be avoided or minimized  Outcome: Met This Shift     Problem: Isolation Precautions - Risk of Spread of Infection  Goal: Prevent transmission of infection  Outcome: Met This Shift     Problem: Nutrition Deficits  Goal: Optimize nutritional status  Outcome: Met This Shift     Problem: Risk for Fluid Volume Deficit  Goal: Maintain normal heart rhythm  Outcome: Met This Shift  Goal: Maintain absence of muscle cramping  Outcome: Met This Shift  Goal: Maintain normal serum potassium, sodium, calcium, phosphorus, and pH  Outcome: Met This Shift     Problem: Loneliness or Risk for Loneliness  Goal: Demonstrate positive use of time alone when socialization is not possible  Outcome: Met This Shift     Problem: Fatigue  Goal: Verbalize increase energy and improved vitality  Outcome: Met This Shift     Problem: Patient Education: Go to Patient Education Activity  Goal: Patient/Family Education  Outcome: Met This Shift     Problem: Discharge Planning:  Goal: Participates in care planning  Description: Participates in care planning  Outcome: Met This Shift  Goal: Discharged to appropriate level of care  Description: Discharged to appropriate level of care  Outcome: Met This Shift     Problem:  Activity Intolerance:  Goal: Ability to tolerate increased activity will improve  Description: Ability to tolerate increased activity will improve  Outcome: Met This Shift

## 2021-09-30 NOTE — H&P
HISTORY AND PHYSICAL             Date: 9/29/2021        Patient Name: Esther Reeves     YOB: 1945      Age:  68 y.o. Chief Complaint     Chief Complaint   Patient presents with    Shortness of Breath    Cough           History Obtained From   patient, electronic medical record    History of Present Illness   59-year-old female who presented to the ED today from the [facility] with chief complaint of shortness of breath x5 days and cough with oxygen saturations of 88% on room air. She is unable to talk except in 2 word sentences. Patient denies fevers or chills to me but chart review shows that she endorses fevers and chills to [facility] clinic. Endorses dyspnea on exertion or at rest.  Denies nausea vomiting diarrhea additional 10 point review of symptoms negative unless otherwise noted below    Past Medical History     Past Medical History:   Diagnosis Date    (HFpEF) heart failure with preserved ejection fraction (HCC)     Chronic anemia     Chronic pain     right hip and knee, pain management, Ligia Menjivar PA-C [place]    Essential hypertension     GERD (gastroesophageal reflux disease)     H/O percutaneous left heart catheterization 12/13/2016    False positive. No CAD    History of nuclear stress test 12/06/2016    lexiscan-mild ischemia mid inferior,EF70%    Hx of echocardiogram 06/26/2017    EF 55-60%. Grade 1 diastolic dysfunction.     Hyperhomocysteinemia (HCC)     Moderate depressive disorder     Morbid obesity (HCC)     Neuropathy     Normocytic anemia     Orthostasis     Osteoarthritis     Sleep apnea     Spinal stenosis of lumbar region         Past Surgical History     Past Surgical History:   Procedure Laterality Date    CATARACT REMOVAL  2008    CHOLECYSTECTOMY      EYE SURGERY      cataracts    GASTRIC BYPASS SURGERY  2010    HYSTERECTOMY      [facility]17/2018    [facility]    Dr. Ruthy Killian SHOULDER SURGERY  2006    total lt shoulder     SHOULDER SURGERY  2006    total rt shoulder    TOTAL KNEE ARTHROPLASTY Right     TOTAL KNEE ARTHROPLASTY  02/2011    left        Medications Prior to Admission     Prior to Admission medications    Medication Sig Start Date End Date Taking? Authorizing Provider   Baclofen (LIORESAL) 5 MG tablet Take 1 or 2 tablets by mouth up to three times daily as needed for back pain.  9/29/21   Claribel Murdock PA-C   hydrOXYzine (ATARAX) 25 MG tablet Take 1 tablet by mouth twice daily as needed for anxiety 9/29/21   Claribel Murdock PA-C   celecoxib (CELEBREX) 200 MG capsule TAKE 1 CAPSULE BY MOUTH TWICE DAILY 8/27/21   Claribel Murdock PA-C   albuterol sulfate HFA (VENTOLIN HFA) 108 (90 Base) MCG/ACT inhaler Inhale 2 puffs into the lungs 4 times daily as needed for Wheezing or Shortness of Breath 8/23/21 April T RUSSELL Ibarra - CNP   cyclobenzaprine (FLEXERIL) 10 MG tablet Take 1 tablet by mouth 3 times daily as needed for Muscle spasms 8/18/21   Berkley Luque PA-C   omeprazole (PRILOSEC) 20 MG delayed release capsule TAKE 1 CAPSULE BY MOUTH TWICE DAILY 7/26/21   Claribel Murdock PA-C   sertraline (ZOLOFT) 100 MG tablet Take 1 tablet by mouth daily 5/28/21 11/24/21  Claribel Murdock PA-C   amitriptyline (ELAVIL) 25 MG tablet TAKE 1 TABLET BY MOUTH EVERY DAY AT BEDTIME 3/18/20 9/29/21  Kee Sam MD   acetaminophen (TYLENOL) 500 MG tablet Take 500 mg by mouth as needed for Pain    Historical Provider, MD        Allergies   Latex and Prednisone    Social History     Social History     Tobacco History     Smoking Status  Never Smoker    Smokeless Tobacco Use  Never Used          Alcohol History     Alcohol Use Status  No          Drug Use     Drug Use Status  No          Sexual Activity     Sexually Active  Not Currently Partners  Male                Family History     Family History   Problem Relation Age of Onset    Cancer Mother     High Blood Pressure Mother     Stroke Mother     Heart Disease Father     High Blood Pressure Father     Cancer Maternal Aunt     Diabetes type 2  Daughter     Coronary Art Dis Daughter     Obesity Daughter     COPD Daughter     No Known Problems Daughter     No Known Problems Son     No Known Problems Son     No Known Problems Son     No Known Problems Son     No Known Problems Son     No Known Problems Son        Review of Systems   Review of Systems   Constitutional: Positive for activity change, appetite change, chills, fatigue and fever. HENT: Negative for congestion and sore throat. Eyes: Negative. Respiratory: Positive for cough and shortness of breath. Negative for chest tightness. Cardiovascular: Negative for chest pain and leg swelling. Gastrointestinal: Negative for abdominal pain, constipation, nausea and vomiting. Endocrine: Negative. Genitourinary: Negative for dysuria and hematuria. Musculoskeletal: Negative for neck pain. Skin: Negative for wound. Neurological: Negative for dizziness and light-headedness. All other systems reviewed and are negative. Physical Exam   /64   Pulse 81   Temp 98.8 °F (37.1 °C) (Oral)   Resp 20   Ht 5' 2\" (1.575 m)   Wt 217 lb (98.4 kg)   SpO2 95%   BMI 39.69 kg/m²     Physical Exam  Vitals and nursing note reviewed. Constitutional:       General: She is not in acute distress. Appearance: Normal appearance. She is ill-appearing. She is not toxic-appearing. HENT:      Head: Normocephalic. Cardiovascular:      Pulses: Normal pulses. Pulmonary:      Effort: Pulmonary effort is normal. No tachypnea, accessory muscle usage or respiratory distress. Breath sounds: Wheezing and rhonchi present. No decreased breath sounds or rales. Abdominal:      General: Abdomen is flat. Bowel sounds are normal. There is no distension. Musculoskeletal:         General: Normal range of motion. Skin:     General: Skin is warm and dry.       Capillary Refill: Capillary refill takes 2 to 3 seconds. Neurological:      General: No focal deficit present. Mental Status: She is alert and oriented to person, place, and time.          Labs      Recent Results (from the past 24 hour(s))   Blood Gas, Venous    Collection Time: 09/29/21  5:15 PM   Result Value Ref Range    pH, [place] 7.44 (H) 7.32 - 7.42    pCO2, [place] 31 (L) 38 - 52 mmHG    pO2, [place] 29 28 - 48 mmHG    Base Excess 2 0 - 2.4    HCO3, Venous 21.1 19 - 25 MMOL/L    O2 Sat, [place] 60.9 50 - 70 %    Comment VBG    EKG 12 Lead    Collection Time: 09/29/21  5:21 PM   Result Value Ref Range    Ventricular Rate 75 BPM    Atrial Rate 75 BPM    P-R Interval 128 ms    QRS Duration 86 ms    Q-T Interval 366 ms    QTc Calculation (Bazett) 408 ms    R Axis -15 degrees    T Axis 19 degrees    Diagnosis       Normal sinus rhythm  Low voltage QRS  Borderline ECG  When compared with ECG of 03-AUG-2018 05:31,  No significant change was found     CBC with Auto Diff    Collection Time: 09/29/21  5:25 PM   Result Value Ref Range    WBC 5.9 4.0 - 10.5 K/CU MM    RBC 4.71 4.2 - 5.4 M/CU MM    Hemoglobin 12.8 12.5 - 16.0 GM/DL    Hematocrit 40.8 37 - 47 %    MCV 86.6 78 - 100 FL    MCH 27.2 27 - 31 PG    MCHC 31.4 (L) 32.0 - 36.0 %    RDW 13.4 11.7 - 14.9 %    Platelets 256 251 - 609 K/CU MM    MPV 9.6 7.5 - 11.1 FL    Differential Type AUTOMATED DIFFERENTIAL     Segs Relative 66.2 (H) 36 - 66 %    Lymphocytes % 24.1 24 - 44 %    Monocytes % 9.0 (H) 0 - 4 %    Eosinophils % 0.0 0 - 3 %    Basophils % 0.2 0 - 1 %    Segs Absolute 3.9 K/CU MM    Lymphocytes Absolute 1.4 K/CU MM    Monocytes Absolute 0.5 K/CU MM    Eosinophils Absolute 0.0 K/CU MM    Basophils Absolute 0.0 K/CU MM    Nucleated RBC % 0.0 %    Total Nucleated RBC 0.0 K/CU MM    Total Immature Neutrophil 0.03 K/CU MM    Immature Neutrophil % 0.5 (H) 0 - 0.43 %   CMP    Collection Time: 09/29/21  5:25 PM   Result Value Ref Range    Sodium 131 (L) 135 - 145 MMOL/L Potassium 4.1 3.5 - 5.1 MMOL/L    Chloride 96 (L) 99 - 110 mMol/L    CO2 20 (L) 21 - 32 MMOL/L    BUN 19 6 - 23 MG/DL    CREATININE 1.0 0.6 - 1.1 MG/DL    Glucose 120 (H) 70 - 99 MG/DL    Calcium 8.7 8.3 - 10.6 MG/DL    Albumin 4.0 3.4 - 5.0 GM/DL    Total Protein 7.4 6.4 - 8.2 GM/DL    Total Bilirubin 0.6 0.0 - 1.0 MG/DL    ALT 33 10 - 40 U/L    AST 63 (H) 15 - 37 IU/L    Alkaline Phosphatase 122 40 - 129 IU/L    GFR Non- 54 (L) >60 mL/min/1.73m2    GFR African American >60 >60 mL/min/1.73m2    Anion Gap 15 4 - 16   Brain Natriuretic Peptide    Collection Time: 09/29/21  5:25 PM   Result Value Ref Range    Pro-.3 <300 PG/ML   COVID-19, Rapid    Collection Time: 09/29/21  5:25 PM    Specimen: Nasopharyngeal   Result Value Ref Range    Source UNKNOWN     SARS-CoV-2, NAAT DETECTED (A) NOT DETECTED   Lactic acid, plasma    Collection Time: 09/29/21  7:25 PM   Result Value Ref Range    Lactate 0.7 0.4 - 2.0 mMOL/L        Imaging/Diagnostics Last 24 Hours   XR CHEST PORTABLE    Result Date: 9/29/2021  EXAMINATION: ONE X-RAY VIEW OF THE CHEST 9/29/2021 5:32 pm COMPARISON: 08/02/2018 HISTORY: ORDERING SYSTEM PROVIDED HISTORY:  SOB, cough TECHNOLOGIST PROVIDED HISTORY: Reason for Exam:  SOB, cough Reason for Exam:  SOB, cough Acuity:  Acute Type of Exam:  Initial FINDINGS: The trachea is midline. The cardiac silhouette is unremarkable. There is patchy airspace changes in the lung bases bilaterally suspicious for infiltrates. There is no pleural fluid. Bilateral shoulder replacements. Patchy bilateral airspace changes in the lung bases consistent with infiltrates. Follow up to resolution is suggested.        Assessment      Hospital Problems         Last Modified POA    COVID-19 9/29/2021 Yes          Plan   #COVID 19 pneumonia  #Acute respiratory failure with hypoxia     Admit to inpatient , COVID 19 unit    -Chest x-ray with nonspecific pulmonary infiltrate consistent with atypical/viral pneumonia    -Check urine for Legionella antigen, Strep PNA    -Start Decadron, received azithromycin and ceftriaxone  in the ED x1. Will check inflammatory markers including pro calcitonin and continue if elevated, otherwise will treat for viral pneumonia for with supporive care    -Wean oxygen as tolerated   -Albuterol,  duo nebs as needed for wheezing   Daily CBC, CMP, CRP, INR, D-dimer, procalcitonin q48hr   Telemetry and continuous pulse ox   Droplet plus precaution   Pharmacy consult for  Remdesevir d/t onset roughly 4-5 days >   -Check LDH, D-dimer, ferritin, CRP for evidence of inflammatory biomarkers      #Generalized anxiety disorder   -Patient very anxious about being hospitalized on a Covid unit, is unclear how she contracted Covid  though she is unvaccinated and lives in a household with her grandchildren who go to public school. Supportive listening provided. Nursing staff will assist patient in calling her daughter since she is unable to visit .    -Continue home Zoloft, Hydroxyxzine prn     #Noninsulin diabetes mellitus    -Place on sliding scale with carb coverage     Continue home medications once otherwise noted above for chronic medical conditions including but not limited to CKD, anemia, hypertension, dyslipidemia, status post gastric bypass, arthritis,  Osteoarthritis     Diet ADULT DIET; Regular     DVT Prophylaxis [x] Lovenox, []  Heparin, [] SCDs, [] Ambulation   GI Prophylaxis [x] PPI,  [] H2 Blocker,  [] Carafate,  [] Diet/Tube Feeds   Code Status Full Code   Disposition Patient requires continued admission due to Covid 19 pneumonia     MDM [] Low, [] Moderate,[x]  High  Patient's risk as above      Patient discussed with and evaluated by Dr Angelina Aragon who agrees with the above diagnosis, treatment and disposition. All testing  and results reviewed with patient . All questions answered.  Patient  voiced understanding      Consultations Ordered:  IP CONSULT TO HOSPITALIST    Electronically signed by RUSSELL Bush CNP on 9/29/21 at 8:57 PM EDT

## 2021-09-30 NOTE — PROGRESS NOTES
Pharmacy Consult for Remdesivir Initiation per RUSSELL Montalvo - CNP    Criteria per [facility] P&T Committee  **All criteria need to be met to receive   Remdesivir for COVID-19 patients**   Laboratory Results    Confirmed positive COVID-19   Yes   Clinical Status     Within 7 days of symptom onset (< 7 days)  Receiving corticosteroid therapy      Yes   Oxygen Status     Requiring supplemental oxygen (<10L)   Yes   Special Considerations    eGFR < 30mL/min: use with caution due to risk of cyclodextrin toxicity      None     Contraindications    1. Invasive or non-invasive mechanical ventilation (>10L NC, HFNC, heated vapotherm, biPAP, or mechanical ventilation)    2. Use of more than 1 vasopressor agent    3. Improving on current treatment/supportive regimen     4. Hepatotoxicity - ALT >10x ULN    5. Elevated inflammatory biomarkers? (Any elevation in CRP, D-dimer, ferritin, or LDH)   ? Elevated biomarkers       Based on the above criteria, the patient is not a candidate for remdesivir therapy. Covering provider Oliver Pineda NP notified. Decision made to not proceed with Remdesivir.       Thank you for the consult,  Katherine Mcdermott RPh

## 2021-09-30 NOTE — PROGRESS NOTES
[facility] Progress Note    Admitting Date and Time: 9/29/2021  5:03 PM  Admit Dx: Cough [R05]  Hypoxia [R09.02]  Pneumonia due to COVID-19 virus [U07.1, J12.82]  COVID-19 [U07.1]    Subjective:  Patient is being followed for Cough [R05]  Hypoxia [R09.02]  Pneumonia due to COVID-19 virus [U07.1, J12.82]  COVID-19 [U07.1]   Pt feels improved compared to yesterday. Still has significant cough but is better than it was yesterday. Has some weakness    ROS: denies fever, chills, cp, sob, n/v, HA unless stated above.       azithromycin  500 mg IntraVENous Q24H    And    cefTRIAXone (ROCEPHIN) IV  1,000 mg IntraVENous Q24H    sertraline  100 mg Oral Daily    pantoprazole  40 mg Oral QAM AC    amitriptyline  25 mg Oral Nightly    celecoxib  200 mg Oral Daily    sodium chloride flush  5-40 mL IntraVENous 2 times per day    enoxaparin  40 mg SubCUTAneous BID    dexamethasone  6 mg IntraVENous Q24H    Vitamin D  2,000 Units Oral Daily    insulin lispro  0-6 Units SubCUTAneous TID WC    insulin lispro  0-3 Units SubCUTAneous Nightly    albuterol sulfate HFA  2 puff Inhalation Q4H WA    And    ipratropium  2 puff Inhalation Q4H WA     albuterol sulfate HFA, 2 puff, Q4H PRN  Baclofen, 10 mg, TID PRN  hydrOXYzine, 25 mg, TID PRN  sodium chloride flush, 5-40 mL, PRN  sodium chloride, 25 mL, PRN  ondansetron, 4 mg, Q8H PRN   Or  ondansetron, 4 mg, Q6H PRN  polyethylene glycol, 17 g, Daily PRN  acetaminophen, 650 mg, Q6H PRN   Or  acetaminophen, 650 mg, Q6H PRN  guaiFENesin-dextromethorphan, 5 mL, Q4H PRN       Objective:    BP (!) 135/90   Pulse 65   Temp 98.2 °F (36.8 °C) (Oral)   Resp 18   Ht 5' 2\" (1.575 m)   Wt 225 lb 1.4 oz (102.1 kg)   SpO2 96%   BMI 41.17 kg/m²   General Appearance: alert and oriented to person, place and time and in no acute distress  Skin: warm and dry  Head: normocephalic and atraumatic  Eyes: pupils equal, round, and reactive to light, extraocular eye movements intact, conjunctivae normal  Neck: neck supple and non tender without mass   Pulmonary/Chest: clear to auscultation bilaterally- no wheezes, rales or rhonchi, normal air movement, no respiratory distress  Cardiovascular: normal rate, normal S1 and S2 and no carotid bruits  Abdomen: soft, non-tender, non-distended, normal bowel sounds, no masses or organomegaly  Extremities: no cyanosis, no clubbing and no edema  Neurologic: no cranial nerve deficit and speech normal        Recent Labs     09/29/21  1725   *   K 4.1   CL 96*   CO2 20*   BUN 19   CREATININE 1.0   GLUCOSE 120*   CALCIUM 8.7       Recent Labs     09/29/21  1725   WBC 5.9   RBC 4.71   HGB 12.8   HCT 40.8   MCV 86.6   MCH 27.2   MCHC 31.4*   RDW 13.4      MPV 9.6       Assessment/Plan:  COVID-19 pneumonia  Acute respiratory failure with hypoxia  -Now off oxygen  -Inflammatory markers elevated therefore did not qualify for remdesivir. As she is off oxygen with good saturation will hold off baricitinib  -Continue supplements  -Monitor for another day and repeat inflammatory markers. Home oxygen assessment prior to discharge  -Procalcitonin low, discontinue antibiotics    Generalized anxiety disorder  -Continue home regimen of Zoloft, Elavil, as needed Atarax    NOTE: This report was transcribed using voice recognition software. Every effort was made to ensure accuracy; however, inadvertent computerized transcription errors may be present.   Electronically signed by Becky Upton MD on 9/30/2021 at 9:44 AM

## 2021-09-30 NOTE — PLAN OF CARE
Problem: Falls - Risk of:  Goal: Will remain free from falls  Description: Will remain free from falls  9/30/2021 0821 by Janet Borrero RN  Outcome: Ongoing  9/29/2021 2307 by Noé Keith RN  Outcome: Met This Shift  Goal: Absence of physical injury  Description: Absence of physical injury  9/30/2021 0821 by Janet Borrero RN  Outcome: Ongoing  9/29/2021 2307 by Noé Keith RN  Outcome: Met This Shift     Problem: Airway Clearance - Ineffective  Goal: Achieve or maintain patent airway  9/30/2021 0821 by Janet Borrero RN  Outcome: Ongoing  9/29/2021 2307 by Noé Keith RN  Outcome: Met This Shift     Problem: Gas Exchange - Impaired  Goal: Absence of hypoxia  9/30/2021 0821 by Janet Borrero RN  Outcome: Ongoing  9/29/2021 2307 by Noé Keith RN  Outcome: Met This Shift  Goal: Promote optimal lung function  9/30/2021 0821 by Janet Borrero RN  Outcome: Ongoing  9/29/2021 2307 by Noé Keith RN  Outcome: Met This Shift     Problem: Breathing Pattern - Ineffective  Goal: Ability to achieve and maintain a regular respiratory rate  9/30/2021 0821 by Janet Borrero RN  Outcome: Ongoing  9/29/2021 2307 by Noé Keith RN  Outcome: Met This Shift     Problem:  Body Temperature -  Risk of, Imbalanced  Goal: Ability to maintain a body temperature within defined limits  9/30/2021 0821 by Janet Borrero RN  Outcome: Ongoing  9/29/2021 2307 by Noé Keith RN  Outcome: Met This Shift  Goal: Will regain or maintain usual level of consciousness  9/30/2021 0821 by Janet Borrero RN  Outcome: Ongoing  9/29/2021 2307 by Noé Keith RN  Outcome: Met This Shift  Goal: Complications related to the disease process, condition or treatment will be avoided or minimized  9/30/2021 0821 by Janet Borrero RN  Outcome: Ongoing  9/29/2021 2307 by Noé Keith RN  Outcome: Met This Shift     Problem: Isolation Precautions - Risk of Spread of Infection  Goal: Prevent transmission of infection  9/30/2021 [id number] by Claire Mcfadden RN  Outcome: Ongoing  9/29/2021 2307 by Shanon Hylton RN  Outcome: Met This Shift     Problem: Nutrition Deficits  Goal: Optimize nutritional status  9/30/2021 0821 by Claire Mcfadden RN  Outcome: Ongoing  9/29/2021 2307 by Shanon Hylton RN  Outcome: Met This Shift     Problem: Risk for Fluid Volume Deficit  Goal: Maintain normal heart rhythm  9/30/2021 0821 by Claire Mcfadden RN  Outcome: Ongoing  9/29/2021 2307 by Shanon Hylton RN  Outcome: Met This Shift  Goal: Maintain absence of muscle cramping  9/30/2021 0821 by Claire Mcfadden RN  Outcome: Ongoing  9/29/2021 2307 by Shanon Hylton RN  Outcome: Met This Shift  Goal: Maintain normal serum potassium, sodium, calcium, phosphorus, and pH  9/30/2021 0821 by Claire Mcfadden RN  Outcome: Ongoing  9/29/2021 2307 by Shanon Hylton RN  Outcome: Met This Shift     Problem: Loneliness or Risk for Loneliness  Goal: Demonstrate positive use of time alone when socialization is not possible  9/30/2021 0821 by Claire Mcfadden RN  Outcome: Ongoing  9/29/2021 2307 by Shanon Hylton RN  Outcome: Met This Shift     Problem: Fatigue  Goal: Verbalize increase energy and improved vitality  9/30/2021 0821 by Claire Mcfadden RN  Outcome: Ongoing  9/29/2021 2307 by Shanon Hylton RN  Outcome: Met This Shift     Problem: Patient Education: Go to Patient Education Activity  Goal: Patient/Family Education  9/30/2021 [id number] by Claire Mcfadden RN  Outcome: Ongoing  9/29/2021 2307 by Shanon Hylton RN  Outcome: Met This Shift     Problem: Discharge Planning:  Goal: Participates in care planning  Description: Participates in care planning  9/30/2021 0821 by Claire Mcfadden RN  Outcome: Ongoing  9/29/2021 2307 by Shanon Hylton RN  Outcome: Met This Shift  Goal: Discharged to appropriate level of care  Description: Discharged to appropriate level of care  9/30/2021 0821 by Cookie Yun STACI Acosta  Outcome: Ongoing  9/29/2021 2307 by Emma Dyer RN  Outcome: Met This Shift     Problem:  Activity Intolerance:  Goal: Ability to tolerate increased activity will improve  Description: Ability to tolerate increased activity will improve  9/30/2021 0821 by Ruibo Hebert RN  Outcome: Ongoing  9/29/2021 2307 by Emma Dyer RN  Outcome: Met This Shift

## 2021-10-01 ENCOUNTER — APPOINTMENT (OUTPATIENT)
Dept: GENERAL RADIOLOGY | Age: 76
DRG: 177 | End: 2021-10-01
Payer: MEDICARE

## 2021-10-01 VITALS
RESPIRATION RATE: 18 BRPM | WEIGHT: 225.09 LBS | HEART RATE: 78 BPM | SYSTOLIC BLOOD PRESSURE: 104 MMHG | OXYGEN SATURATION: 95 % | HEIGHT: 62 IN | TEMPERATURE: 98.1 F | DIASTOLIC BLOOD PRESSURE: 70 MMHG | BODY MASS INDEX: 41.42 KG/M2

## 2021-10-01 LAB
C-REACTIVE PROTEIN, HIGH SENSITIVITY: 28.1 MG/L
D DIMER: 290 NG/ML(DDU)
GLUCOSE BLD-MCNC: 115 MG/DL (ref 70–99)
GLUCOSE BLD-MCNC: 122 MG/DL (ref 70–99)

## 2021-10-01 PROCEDURE — 6370000000 HC RX 637 (ALT 250 FOR IP): Performed by: NURSE PRACTITIONER

## 2021-10-01 PROCEDURE — 82962 GLUCOSE BLOOD TEST: CPT

## 2021-10-01 PROCEDURE — 94664 DEMO&/EVAL PT USE INHALER: CPT

## 2021-10-01 PROCEDURE — 71045 X-RAY EXAM CHEST 1 VIEW: CPT

## 2021-10-01 PROCEDURE — 99232 SBSQ HOSP IP/OBS MODERATE 35: CPT | Performed by: INTERNAL MEDICINE

## 2021-10-01 PROCEDURE — 6360000002 HC RX W HCPCS: Performed by: NURSE PRACTITIONER

## 2021-10-01 PROCEDURE — 94640 AIRWAY INHALATION TREATMENT: CPT

## 2021-10-01 PROCEDURE — 85379 FIBRIN DEGRADATION QUANT: CPT

## 2021-10-01 PROCEDURE — 94761 N-INVAS EAR/PLS OXIMETRY MLT: CPT

## 2021-10-01 PROCEDURE — 36415 COLL VENOUS BLD VENIPUNCTURE: CPT

## 2021-10-01 PROCEDURE — 94618 PULMONARY STRESS TESTING: CPT

## 2021-10-01 PROCEDURE — 86140 C-REACTIVE PROTEIN: CPT

## 2021-10-01 PROCEDURE — 2580000003 HC RX 258: Performed by: NURSE PRACTITIONER

## 2021-10-01 RX ORDER — SERTRALINE HYDROCHLORIDE 100 MG/1
100 TABLET, FILM COATED ORAL NIGHTLY
Qty: 90 TABLET | Refills: 1
Start: 2021-10-01 | End: 2022-03-30 | Stop reason: SDUPTHER

## 2021-10-01 RX ORDER — GUAIFENESIN/DEXTROMETHORPHAN 100-10MG/5
5 SYRUP ORAL EVERY 4 HOURS PRN
Qty: 120 ML | Refills: 0 | Status: SHIPPED | OUTPATIENT
Start: 2021-10-01 | End: 2021-10-11

## 2021-10-01 RX ADMIN — PANTOPRAZOLE SODIUM 40 MG: 40 TABLET, DELAYED RELEASE ORAL at 06:36

## 2021-10-01 RX ADMIN — Medication 2 PUFF: at 08:59

## 2021-10-01 RX ADMIN — CELECOXIB 200 MG: 200 CAPSULE ORAL at 09:32

## 2021-10-01 RX ADMIN — SODIUM CHLORIDE, PRESERVATIVE FREE 10 ML: 5 INJECTION INTRAVENOUS at 09:33

## 2021-10-01 RX ADMIN — Medication 2000 UNITS: at 09:33

## 2021-10-01 RX ADMIN — ALBUTEROL SULFATE 2 PUFF: 90 AEROSOL, METERED RESPIRATORY (INHALATION) at 08:58

## 2021-10-01 RX ADMIN — ENOXAPARIN SODIUM 40 MG: 40 INJECTION SUBCUTANEOUS at 09:33

## 2021-10-01 RX ADMIN — GUAIFENESIN SYRUP AND DEXTROMETHORPHAN 5 ML: 100; 10 SYRUP ORAL at 09:47

## 2021-10-01 RX ADMIN — DEXAMETHASONE SODIUM PHOSPHATE 6 MG: 10 INJECTION, SOLUTION INTRAMUSCULAR; INTRAVENOUS at 09:33

## 2021-10-01 RX ADMIN — ACETAMINOPHEN 650 MG: 325 TABLET ORAL at 09:47

## 2021-10-01 RX ADMIN — SERTRALINE HYDROCHLORIDE 100 MG: 100 TABLET ORAL at 09:32

## 2021-10-01 ASSESSMENT — PAIN DESCRIPTION - PROGRESSION: CLINICAL_PROGRESSION: NOT CHANGED

## 2021-10-01 ASSESSMENT — PAIN SCALES - GENERAL
PAINLEVEL_OUTOF10: 6
PAINLEVEL_OUTOF10: 0
PAINLEVEL_OUTOF10: 6
PAINLEVEL_OUTOF10: 0

## 2021-10-01 ASSESSMENT — PAIN DESCRIPTION - LOCATION: LOCATION: HEAD

## 2021-10-01 ASSESSMENT — PAIN DESCRIPTION - PAIN TYPE: TYPE: ACUTE PAIN

## 2021-10-01 ASSESSMENT — PAIN - FUNCTIONAL ASSESSMENT: PAIN_FUNCTIONAL_ASSESSMENT: ACTIVITIES ARE NOT PREVENTED

## 2021-10-01 ASSESSMENT — PAIN DESCRIPTION - FREQUENCY: FREQUENCY: INTERMITTENT

## 2021-10-01 ASSESSMENT — PAIN DESCRIPTION - ONSET: ONSET: ON-GOING

## 2021-10-01 ASSESSMENT — PAIN DESCRIPTION - DESCRIPTORS: DESCRIPTORS: HEADACHE

## 2021-10-01 NOTE — DISCHARGE SUMMARY
Discharge Summary    Name:  Chan Silverio /Age/Sex: 1945  (68 y.o. female)   MRN & CSN:  [id number] & [id number] Admission Date/Time: 2021  5:03 PM   Attending:  No att. providers found Discharging Physician: Yoko Kapadia MD     Hospital Course:   Chan Silverio is a 68 y.o.  female with a past medical history of morbid obesity, DJD s/p bilateral TKA, anxiety depression, who present to the ED 2 days ago on account of intractable cough of about 4 days duration PTA. Patient ruled in for COVID-19 and so was managed for COVID-19 pneumonia. Due to minimal O2 requirement, patient did not qualify for any Covid directed therapies. Patient is feeling much improved after antitussives and inhaler treatments and request for discharge. I think this is reasonable as she can follow-up closely with her PCP. Patient is currently on room air at the time of my evaluation today. The patient/family expressed appropriate understanding of and agreement with the discharge recommendations, medications, and plan. Consults this admission:  IP CONSULT TO HOSPITALIST  IP CONSULT TO PHARMACY  IP CONSULT TO PULMONOLOGY    Discharge Instruction:   Follow up appointments: PCP  Primary care physician:  within 2 weeks    Diet:  regular diet   Activity: activity as tolerated  Disposition: Discharged to:   [x]Home, []C, []SNF, []Acute Rehab, []Hospice   Condition on discharge: Stable    Discharge Medications:       [facility]   Home Medication Instructions [id number]    Printed on:10/01/21 1331   Medication Information                      acetaminophen (TYLENOL) 500 MG tablet  Take 500 mg by mouth as needed for Pain             albuterol sulfate HFA (VENTOLIN HFA) 108 (90 Base) MCG/ACT inhaler  Inhale 2 puffs into the lungs 4 times daily as needed for Wheezing or Shortness of Breath             amitriptyline (ELAVIL) 25 MG tablet  TAKE 1 TABLET BY MOUTH EVERY DAY AT BEDTIME             Baclofen (LIORESAL) 5 MG tablet  Take 1 or 2 tablets by mouth up to three times daily as needed for back pain. celecoxib (CELEBREX) 200 MG capsule  TAKE 1 CAPSULE BY MOUTH TWICE DAILY             guaiFENesin-dextromethorphan (ROBITUSSIN DM) 100-10 MG/5ML syrup  Take 5 mLs by mouth every 4 hours as needed for Cough             hydrOXYzine (ATARAX) 25 MG tablet  Take 1 tablet by mouth twice daily as needed for anxiety             omeprazole (PRILOSEC) 20 MG delayed release capsule  TAKE 1 CAPSULE BY MOUTH TWICE DAILY             sertraline (ZOLOFT) 100 MG tablet  Take 1 tablet by mouth nightly                 Objective Findings at Discharge:   /70   Pulse 78   Temp 98.1 °F (36.7 °C) (Oral)   Resp 18   Ht 5' 2\" (1.575 m)   Wt 225 lb 1.4 oz (102.1 kg)   SpO2 95%   BMI 41.17 kg/m²            PHYSICAL EXAM  GEN: Awake female, alert and oriented x3 in no apparent distress. Appears given age. Morbidly obese. Mild central tremor. HEENT: Normal   NECK: Supple, no apparent thyromegaly or masses. No LUCERO  RESP: Clear lung fields bilaterally. Symmetric chest movement while on RA  CVS: RRR, S1, S2  GI/: Abdomen is soft, nontender, no organomegaly. . Bowel sounds normal, rectal exam deferred. No CVA tenderness. MSK: No gross joint deformities. No tenderness  SKIN: Normal coloration, warm, dry. Bilateral knee midline healed scars  NEURO: Cranial nerves appear grossly intact, normal speech, no lateralizing weakness. PSYCH: Awake, alert, oriented x 4. Affect appropriate.     BMP/CBC  Recent Labs     09/29/21  1725   *   K 4.1   CL 96*   CO2 20*   BUN 19   CREATININE 1.0   WBC 5.9   HCT 40.8          Discharge Time of 35 minutes    Electronically signed by Tiffanie Willis MD on 10/1/2021 at 1:31 PM

## 2021-10-01 NOTE — PROGRESS NOTES
10/1/2021 11:52 AM  Patient Room #: 8048/1544-F  Patient Name: Junior Moses    (Step 1 Done by RN if possible otherwise call Pulmonary Diagnostics)  1. Place patient on room air at rest for at least 30 minutes. If patient falls below 88% before 30 minutes then you can record the level and stop. Record room air saturation level _95_ %. If patient is at 88% or below, they will qualify for home oxygen and you can stop. If level does not fall below 88%, fill in level above. If indicated continue to Step 2. Signature:_Jennifer Ly, RRT__ Date: _10/01/2021__  (Step 2&3 Done by RCP)  2. Ambulate patient on room air until saturation falls below 89%. Record level of room air saturation with ambulation__91_ %. Next, place patient back on ___lpm oxygen and ambulate, record level __%. (Note:  this level must show improvement from room air level done with ambulation.)  If patients saturation on room air with ambulation is 88% or below AND patient shows improvement with oxygen during ambulation, they will qualify for home oxygen and you can stop. If patient does not drop below 89%, then patient should have an overnight oximetry trending on room air to see if level falls below 88%. Complete level in Step 3 below. 3. Room air overnight oximetry level 88 % for___  cumulative minutes. If patients room air oxygen level is < 89% for at least 5 cumulative minutes, patient will qualify for home oxygen and you can stop. (Attach Night Trending Report)    Complete order below: Diagnosis:__COVID-19__  Home oxygen at:  Length of Need: ? Lifetime ?  3 Months     ___lpm or __%   via  [] nasal cannula  []mask  [] other:___         []continuous []  with activity  []  Nocturnal   [] Portable Tanks []  Concentrator        Therapist Signature:__Jennifer Ly, RRT___     Date:  __10/01/2021_  Physician Signature:  __Electronically Signed in EMR_    Date:___  Physician Printed Name:  Ordering User: Katalina Garcia Donnell Hernandez MD  NPI:  [id number]  [] Patient Qualifies      [x] Patient Does NOT qualify

## 2021-10-01 NOTE — RT PROTOCOL NOTE
RT Inhaler-Nebulizer Bronchodilator Protocol Note    There is a bronchodilator order in the chart from a provider indicating to follow the RT Bronchodilator Protocol and there is an Initiate RT Inhaler-Nebulizer Bronchodilator Protocol order as well (see protocol at bottom of note). CXR Findings:  XR CHEST PORTABLE    Result Date: 10/1/2021  Mild patchy consolidation in the peripheral left lung and right lung base. XR CHEST PORTABLE    Result Date: 9/29/2021  Patchy bilateral airspace changes in the lung bases consistent with infiltrates. Follow up to resolution is suggested. The findings from the last RT Protocol Assessment were as follows:   History Pulmonary Disease: None or smoker <15 pack years  Respiratory Pattern: Dyspnea on exertion or RR 21-25 bpm  Breath Sounds: Clear breath sounds  Cough: Strong, spontaneous, non-productive  Indication for Bronchodilator Therapy: On home bronchodilators  Bronchodilator Assessment Score: 2    Aerosolized bronchodilator medication orders have been revised according to the RT Inhaler-Nebulizer Bronchodilator Protocol below. Respiratory Therapist to perform RT Therapy Protocol Assessment initially then follow the protocol. Repeat RT Therapy Protocol Assessment PRN for score 0-3 or on second treatment, BID, and PRN for scores above 3. No Indications - adjust the frequency to every 6 hours PRN wheezing or bronchospasm, if no treatments needed after 48 hours then discontinue using Per Protocol order mode. If indication present, adjust the RT bronchodilator orders based on the Bronchodilator Assessment Score as indicated below.   Use Inhaler orders unless patient has one or more of the following: on home nebulizer, not able to hold breath for 10 seconds, is not alert and oriented, cannot activate and use MDI correctly, or respiratory rate 25 breaths per minute or more, then use the equivalent nebulizer order(s) with same Frequency and PRN reasons based on the score. If a patient is on this medication at home then do not decrease Frequency below that used at home. 0-3 - enter or revise RT bronchodilator order(s) to equivalent RT Bronchodilator order with Frequency of every 4 hours PRN for wheezing or increased work of breathing using Per Protocol order mode. 4-6 - enter or revise RT Bronchodilator order(s) to two equivalent RT bronchodilator orders with one order with BID Frequency and one order with Frequency of every 4 hours PRN wheezing or increased work of breathing using Per Protocol order mode. 7-10 - enter or revise RT Bronchodilator order(s) to two equivalent RT bronchodilator orders with one order with TID Frequency and one order with Frequency of every 4 hours PRN wheezing or increased work of breathing using Per Protocol order mode. 11-13 - enter or revise RT Bronchodilator order(s) to one equivalent RT bronchodilator order with QID Frequency and an Albuterol order with Frequency of every 4 hours PRN wheezing or increased work of breathing using Per Protocol order mode. Greater than 13 - enter or revise RT Bronchodilator order(s) to one equivalent RT bronchodilator order with every 4 hours Frequency and an Albuterol order with Frequency of every 2 hours PRN wheezing or increased work of breathing using Per Protocol order mode. RT to enter RT Home Evaluation for COPD & MDI Assessment order using Per Protocol order mode.     Electronically signed by Terrence Dawson RCP on 10/1/2021 at 12:05 PM

## 2021-10-01 NOTE — PROGRESS NOTES
Pt did not qualify for oxygen first 2 Steps. Overnight trend will not be ordered because pt has sleep apnea.

## 2021-10-01 NOTE — PLAN OF CARE
Problem: Falls - Risk of:  Goal: Will remain free from falls  Description: Will remain free from falls  9/30/2021 2213 by Naeem Casas RN  Outcome: Met This Shift  9/30/2021 0821 by Nova Frias RN  Outcome: Ongoing  Goal: Absence of physical injury  Description: Absence of physical injury  9/30/2021 2213 by Naeem Casas RN  Outcome: Met This Shift  9/30/2021 0821 by Nova Frias RN  Outcome: Ongoing     Problem: Airway Clearance - Ineffective  Goal: Achieve or maintain patent airway  9/30/2021 2213 by Naeem Casas RN  Outcome: Met This Shift  9/30/2021 0821 by Nova Frias RN  Outcome: Ongoing     Problem: Gas Exchange - Impaired  Goal: Absence of hypoxia  9/30/2021 2213 by Naeem Casas RN  Outcome: Met This Shift  9/30/2021 0821 by Nova Frias RN  Outcome: Ongoing  Goal: Promote optimal lung function  9/30/2021 2213 by Naeem Casas RN  Outcome: Met This Shift  9/30/2021 0821 by Nova Frias RN  Outcome: Ongoing     Problem: Breathing Pattern - Ineffective  Goal: Ability to achieve and maintain a regular respiratory rate  9/30/2021 2213 by Naeem Casas RN  Outcome: Met This Shift  9/30/2021 0821 by Nova Frias RN  Outcome: Ongoing     Problem:  Body Temperature -  Risk of, Imbalanced  Goal: Ability to maintain a body temperature within defined limits  9/30/2021 2213 by Naeem Casas RN  Outcome: Met This Shift  9/30/2021 0821 by Nova Frias RN  Outcome: Ongoing  Goal: Will regain or maintain usual level of consciousness  9/30/2021 2213 by Naeem Casas RN  Outcome: Met This Shift  9/30/2021 0821 by Nova Frias RN  Outcome: Ongoing  Goal: Complications related to the disease process, condition or treatment will be avoided or minimized  9/30/2021 2213 by Naeem Casas RN  Outcome: Met This Shift  9/30/2021 0821 by Nova Frias RN  Outcome: Ongoing     Problem: Isolation Precautions - Risk of Spread of Infection  Goal: Prevent transmission of infection  9/30/2021 2213 by Gavin Dan RN  Outcome: Met This Shift  9/30/2021 0821 by Mikal Deshpande RN  Outcome: Ongoing     Problem: Nutrition Deficits  Goal: Optimize nutritional status  9/30/2021 2213 by Gavin Dan RN  Outcome: Met This Shift  9/30/2021 0821 by Mikal Deshpande RN  Outcome: Ongoing     Problem: Risk for Fluid Volume Deficit  Goal: Maintain normal heart rhythm  9/30/2021 2213 by Gavin Dan RN  Outcome: Met This Shift  9/30/2021 0821 by Mikal Deshpande RN  Outcome: Ongoing  Goal: Maintain absence of muscle cramping  9/30/2021 2213 by Gavin Dan RN  Outcome: Met This Shift  9/30/2021 0821 by Mikal Deshpande RN  Outcome: Ongoing  Goal: Maintain normal serum potassium, sodium, calcium, phosphorus, and pH  9/30/2021 2213 by Gavin Dan RN  Outcome: Met This Shift  9/30/2021 0821 by Mikal Deshpande RN  Outcome: Ongoing     Problem: Loneliness or Risk for Loneliness  Goal: Demonstrate positive use of time alone when socialization is not possible  9/30/2021 2213 by Gavin Dan RN  Outcome: Met This Shift  9/30/2021 0821 by Mikal Deshpande RN  Outcome: Ongoing     Problem: Fatigue  Goal: Verbalize increase energy and improved vitality  9/30/2021 2213 by Gavin Dan RN  Outcome: Met This Shift  9/30/2021 0821 by Mikal Deshpande RN  Outcome: Ongoing     Problem: Patient Education: Go to Patient Education Activity  Goal: Patient/Family Education  9/30/2021 2213 by Gavin Dan RN  Outcome: Met This Shift  9/30/2021 0821 by Mikal Deshpande RN  Outcome: Ongoing     Problem: Discharge Planning:  Goal: Participates in care planning  Description: Participates in care planning  9/30/2021 2213 by Gavin Dan RN  Outcome: Met This Shift  9/30/2021 0821 by Mikal Deshpande RN  Outcome: Ongoing  Goal: Discharged to appropriate level of care  Description: Discharged to appropriate level of care  9/30/2021 2213 by Gavin Dan RN  Outcome: Met This Shift  9/30/2021 0821 by Renea Goldberg RN  Outcome: Ongoing     Problem:  Activity Intolerance:  Goal: Ability to tolerate increased activity will improve  Description: Ability to tolerate increased activity will improve  9/30/2021 2213 by Piero Burgos RN  Outcome: Met This Shift  9/30/2021 0821 by Renea Goldberg RN  Outcome: Ongoing

## 2021-10-01 NOTE — PROGRESS NOTES
(transient ischemic attack) 06/17/2017    Chronic left-sided low back pain with left-sided sciatica 05/31/2017    Primary osteoarthritis of left hip 05/31/2017    Morbid obesity with BMI of 40.0-44.9, adult ([facility] [address] [address] 05/31/2017    Unstable angina ([facility]     Arthritis 08/02/2016    Abdominal pain 09/07/2013     Overview Note:     Probable gastritis      Status post gastric bypass for obesity 09/07/2013    CHARLIE (generalized anxiety disorder) 09/07/2013   Acute Hypoxic resp failure- slowly improving  Bilateral Pneumonia sec to COVID-19  Morbid Obesity  ANITHA  HTN       1. Decadron  2. Insulin  3. ICS  4. OOB  5. Keep sats > 92%  6. Home O2 eval  7. BIPAP  8.  C/w present management    Electronically signed by Heidi Byrd MD on 10/1/2021 at 11:38 AM

## 2021-10-03 NOTE — TELEPHONE ENCOUNTER
Pt c/o diarrhea . Pt requesting rx loperamide. Informed pt this gen imodium. Pt requesting rx. Noted on 5/28/21 ov note    - c/o migraines/ headaches pt states she mentioned this during 5/28/21 visit. Not noted.
Spoke with pt per puga note.  Pt agreed & voiced understanding
yes

## 2021-10-04 ENCOUNTER — CARE COORDINATION (OUTPATIENT)
Dept: CASE MANAGEMENT | Age: 76
End: 2021-10-04

## 2021-10-04 DIAGNOSIS — U07.1 COVID-19: Primary | ICD-10-CM

## 2021-10-04 LAB
CULTURE: NORMAL
CULTURE: NORMAL
Lab: NORMAL
Lab: NORMAL
SPECIMEN: NORMAL
SPECIMEN: NORMAL

## 2021-10-04 PROCEDURE — 1111F DSCHRG MED/CURRENT MED MERGE: CPT | Performed by: PHYSICIAN ASSISTANT

## 2021-10-04 NOTE — CARE COORDINATION
[facility] Transitions Initial Follow Up Call    Call within 2 business days of discharge: Yes    Patient: Chan Silverio Patient : 1945   MRN: [id number]  Reason for Admission: Lebron Lopez Resp Failure  Discharge Date: 10/1/21 RARS: Readmission Risk Score: 9  Facility: [address] [place] [address]       Complaint Diagnosis Description Type Department Provider    21 Shortness of Breath; Cough COVID-19 . .. ED to Hosp-Admission (Discharged) (ADMITTED) Mahad Alvarado MD; Ricardo Og. ..         Non-face-to-face services provided:  Obtained and reviewed discharge summary and/or continuity of care documents    Care Transitions 24 Hour Call    Schedule Follow Up Appointment with PCP: Wolf Armenta you have any ongoing symptoms?: Yes  Patient-reported symptoms: Cough  Interventions for patient-reported symptoms: Other (Comment: no reported or identified need)  Do you have a copy of your discharge instructions?: Yes  Do you have all of your prescriptions and are they filled?: Yes  Have you been contacted by a [address]?: No  Have you scheduled your follow up appointment?: Yes  How are you going to get to your appointment?: Other (Comment: VV)  Were you discharged with any Home Care or Post Acute Services: Yes  Post Acute Services: [address] (Comment: [facility] ([facility]) 7 days x week, 2 hrs in am, 3 hrs in pm)  Do you feel like you have everything you need to keep you well at home?: Yes  Care Transitions Interventions   Home Care Waiver: Completed        Transportation Support: Declined      DME Assistance: Declined     Senior Services: Declined         Future Appointments   Date Time Provider Johnny Chris   10/7/2021 11:00 AM Tono MOSCOSO LPN [address] [place] [facility]     1:00 PM Kevin Oden PA-C [address] [place] [facility]       Challenges to be reviewed by the provider   Additional needs identified to be addressed with provider: denies       Method of communication with provider : none    Was this a readmission? No  Patient stated reason for admission: sob, cough  Patients top risk factors for readmission: medical condition-Covid19 Pna, CHF    Spoke with Nakul Knott/HIREN for [facility] discharge call as Patient not home, verified Patient name and  as identifiers. Introduced self and explained BPCI program. Agreeable to ongoing BPCI follow up per [employer].     Phone Assessment: Reports Patient doing well since home other than rare cough which is significantly improved. Denies sob, fever, chills, malaise or other Covid19 related sx. Adds that total of 13 people live in same home w/ Patient and non have tested positive. Education Provided:   Reviewed discharge instructions, medical action plan and Covid19 red flags such as increased shortness of breath, increasing fever and signs of decompensation. Advised rest, pacing activity, adequate hydration/nutrition (within any MD recommended dietary/fluid restrictions). Discussed increased risk of blood clots associated w/ Covid19, sx and preventative measures. Discussed exposure protocols and quarantine with CDC Guidelines What to do if you are sick with coronavirus disease 2019.  POA verbalizes understanding. Has family/friends who are able to drop off any needed supplies or food. Advised to contact Health Department PCP re: any additional Covid19 questions. Patient has not received Covid19 vaccine series nor interested. Advised continued Covid19 preventative measures including mask covering nose and mouth, social distancing, hand washing, contacting MD at first sign of any flu-like sx. AVS/Meds: Confirmed copy of AVS received, reviewed with POA Confirmed Patient obtained and is taking all new and routine rx as directed. Medication review completed. Advised obtaining 90 day supply of routine, prn meds. Advised contacting pharmacy/md for refill needs.      Resource Need Assessment:   Living Arrangements: lives w/ family (total of 13 people in the home)   ADLS:requires assist from [facility]   DME:bsc, shower chair and cane   Transportation: family  [facility]  Waiver 7 days per week w/ 2hrs am and 3hrs pm  Community Assistance:Waiver   Referrals Made per CTN with Patient/Family Approval: [address] [place] [address] Follow Up Appointments: Discussed importance of 7 day hospital follow up appointment for continuity of care. Verified POA aware of VV appt w/ PCP 10/7/21 11am.    Advanced Care Planning / HCDM:  Advance Care Planning  -- POA uncertain  Healthcare Decision Maker:    Primary Decision Maker: Mady Waterman - [facility] - [phone]  POA    Advised to contact PCP 24/7 re: any health concerns for early outpatient intervention in an effort to avoid hospitalization.  Michael Jones RN

## 2021-10-05 NOTE — TELEPHONE ENCOUNTER
Reporting:    Patient has been discharged from hospital on 10/1/21---according to patient's granddaughter, patient was admitted to [facility] on 9/27/21, not 9/30/21. Patient is in quarantine at this time.

## 2021-10-11 ENCOUNTER — CARE COORDINATION (OUTPATIENT)
Dept: CASE MANAGEMENT | Age: 76
End: 2021-10-11

## 2021-10-11 NOTE — CARE COORDINATION
[facility] Transitions Follow Up Call    10/11/2021    Patient: Esther Andrade  Patient : 1945   MRN: [id number]  Reason for Admission:   Discharge Date: 10/1/21 RARS: Readmission Risk Score: 9    Attempted to contact patient for BPCI-A follow up. Unable to reach patient. Left message with contact information and request for call back.         Follow Up  Future Appointments   Date Time Provider Johnny Chris   2021  1:00 PM Lincoln Cerda[address] [facility]       Dewayne Claros RN

## 2021-10-18 ENCOUNTER — CARE COORDINATION (OUTPATIENT)
Dept: CASE MANAGEMENT | Age: 76
End: 2021-10-18

## 2021-10-18 NOTE — CARE COORDINATION
[facility] Transitions Follow Up Call    10/18/2021    Patient: Ilana Rodriguez  Patient : 1945   MRN: [id number]  Reason for Admission:   Discharge Date: 10/1/21 RARS: Readmission Risk Score: 9         Spoke with: Patient's daughter    Contacted patient for BPCI-A follow up. Spoke briefly with patient's daughter. She states that her mother is doing alright. Continues to become short of breath with exertion. Denies distress. She is not on oxygen. No c/o chest pain/discomfort, pressure, tightness, cough, fever, chills. Appetite is good and staying hydrated. Daughter confirmed patient had a follow up with her PCP. No new or changed medications. No needs or concerns at this time. Will continue to follow. Care Transitions Subsequent and Final Call    Subsequent and Final Calls  Do you have any ongoing symptoms?: Yes  Patient-reported symptoms: Shortness of Breath  Have your medications changed?: No  Do you have any questions related to your medications?: No  Are you currently active with any services?: Home Health  Do you have any needs or concerns that I can assist you with?: No  Care Transitions Interventions   Home Care Waiver: Completed        Transportation Support: Declined      DME Assistance: Declined     Senior Services: Declined    Other Interventions:            Follow Up  Future Appointments   Date Time Provider Johnny Chris   2021  1:00 PM Sebas Benavides[address] [facility] [facility] BELIA Ortez RN

## 2021-10-21 DIAGNOSIS — K21.9 GASTROESOPHAGEAL REFLUX DISEASE WITHOUT ESOPHAGITIS: ICD-10-CM

## 2021-10-21 RX ORDER — OMEPRAZOLE 20 MG/1
CAPSULE, DELAYED RELEASE ORAL
Qty: 180 CAPSULE | Refills: 0 | Status: SHIPPED | OUTPATIENT
Start: 2021-10-21 | End: 2022-03-30 | Stop reason: SDUPTHER

## 2021-10-23 DIAGNOSIS — N32.81 OVERACTIVE BLADDER: ICD-10-CM

## 2021-10-25 ENCOUNTER — CARE COORDINATION (OUTPATIENT)
Dept: CASE MANAGEMENT | Age: 76
End: 2021-10-25

## 2021-10-26 RX ORDER — MIRABEGRON 25 MG/1
TABLET, FILM COATED, EXTENDED RELEASE ORAL
Qty: 90 TABLET | Refills: 0 | Status: SHIPPED | OUTPATIENT
Start: 2021-10-26 | End: 2022-03-30 | Stop reason: SDUPTHER

## 2021-11-01 ENCOUNTER — CARE COORDINATION (OUTPATIENT)
Dept: CASE MANAGEMENT | Age: 76
End: 2021-11-01

## 2021-11-01 NOTE — CARE COORDINATION
[facility] Transitions Follow Up Call    2021    Patient: Virginia Francis  Patient : 1945   MRN: [id number]  Reason for Admission:   Discharge Date: 10/1/21 RARS: Readmission Risk Score: 9    Attempted to contact patient for BPCI-A follow up. Unable to reach patient. Left message with contact information and request for call back. Patient has ICD 10 of U07.1 (COVID). Next outreach scheduled for 21.       Follow Up  Future Appointments   Date Time Provider Johnny Chris   2021  1:00 PM Renato Orellana [facility] BELIA Ramos RN

## 2021-11-04 DIAGNOSIS — F41.1 GAD (GENERALIZED ANXIETY DISORDER): ICD-10-CM

## 2021-11-04 DIAGNOSIS — M48.062 SPINAL STENOSIS OF LUMBAR REGION WITH NEUROGENIC CLAUDICATION: ICD-10-CM

## 2021-11-05 RX ORDER — CELECOXIB 200 MG/1
CAPSULE ORAL
Qty: 180 CAPSULE | Refills: 0 | Status: SHIPPED | OUTPATIENT
Start: 2021-11-05 | End: 2022-02-09

## 2021-11-05 RX ORDER — SERTRALINE HYDROCHLORIDE 100 MG/1
TABLET, FILM COATED ORAL
Qty: 90 TABLET | Refills: 1 | OUTPATIENT
Start: 2021-11-05

## 2021-11-06 DIAGNOSIS — J06.9 VIRAL URI: ICD-10-CM

## 2021-12-10 ENCOUNTER — VIRTUAL VISIT (OUTPATIENT)
Dept: FAMILY MEDICINE CLINIC | Age: 76
End: 2021-12-10
Payer: MEDICARE

## 2021-12-10 DIAGNOSIS — Z00.00 ROUTINE GENERAL MEDICAL EXAMINATION AT A HEALTH CARE FACILITY: Primary | ICD-10-CM

## 2021-12-10 PROCEDURE — G0438 PPPS, INITIAL VISIT: HCPCS | Performed by: FAMILY MEDICINE

## 2021-12-10 PROCEDURE — 1123F ACP DISCUSS/DSCN MKR DOCD: CPT | Performed by: FAMILY MEDICINE

## 2021-12-10 PROCEDURE — 4040F PNEUMOC VAC/ADMIN/RCVD: CPT | Performed by: FAMILY MEDICINE

## 2021-12-10 SDOH — ECONOMIC STABILITY: FOOD INSECURITY: WITHIN THE PAST 12 MONTHS, THE FOOD YOU BOUGHT JUST DIDN'T LAST AND YOU DIDN'T HAVE MONEY TO GET MORE.: NEVER TRUE

## 2021-12-10 SDOH — ECONOMIC STABILITY: FOOD INSECURITY: WITHIN THE PAST 12 MONTHS, YOU WORRIED THAT YOUR FOOD WOULD RUN OUT BEFORE YOU GOT MONEY TO BUY MORE.: NEVER TRUE

## 2021-12-10 ASSESSMENT — COLUMBIA-SUICIDE SEVERITY RATING SCALE - C-SSRS
1. WITHIN THE PAST MONTH, HAVE YOU WISHED YOU WERE DEAD OR WISHED YOU COULD GO TO SLEEP AND NOT WAKE UP?: NO
6. HAVE YOU EVER DONE ANYTHING, STARTED TO DO ANYTHING, OR PREPARED TO DO ANYTHING TO END YOUR LIFE?: NO
2. HAVE YOU ACTUALLY HAD ANY THOUGHTS OF KILLING YOURSELF?: NO

## 2021-12-10 ASSESSMENT — PATIENT HEALTH QUESTIONNAIRE - PHQ9
7. TROUBLE CONCENTRATING ON THINGS, SUCH AS READING THE NEWSPAPER OR WATCHING TELEVISION: 3
6. FEELING BAD ABOUT YOURSELF - OR THAT YOU ARE A FAILURE OR HAVE LET YOURSELF OR YOUR FAMILY DOWN: 2
SUM OF ALL RESPONSES TO PHQ QUESTIONS 1-9: 23
10. IF YOU CHECKED OFF ANY PROBLEMS, HOW DIFFICULT HAVE THESE PROBLEMS MADE IT FOR YOU TO DO YOUR WORK, TAKE CARE OF THINGS AT HOME, OR GET ALONG WITH OTHER PEOPLE: 0
4. FEELING TIRED OR HAVING LITTLE ENERGY: 3
9. THOUGHTS THAT YOU WOULD BE BETTER OFF DEAD, OR OF HURTING YOURSELF: 0
SUM OF ALL RESPONSES TO PHQ9 QUESTIONS 1 & 2: 6
3. TROUBLE FALLING OR STAYING ASLEEP: 3
2. FEELING DOWN, DEPRESSED OR HOPELESS: 3
5. POOR APPETITE OR OVEREATING: 3
SUM OF ALL RESPONSES TO PHQ QUESTIONS 1-9: 23
SUM OF ALL RESPONSES TO PHQ QUESTIONS 1-9: 23
1. LITTLE INTEREST OR PLEASURE IN DOING THINGS: 3
8. MOVING OR SPEAKING SO SLOWLY THAT OTHER PEOPLE COULD HAVE NOTICED. OR THE OPPOSITE, BEING SO FIGETY OR RESTLESS THAT YOU HAVE BEEN MOVING AROUND A LOT MORE THAN USUAL: 3

## 2021-12-10 ASSESSMENT — SOCIAL DETERMINANTS OF HEALTH (SDOH): HOW HARD IS IT FOR YOU TO PAY FOR THE VERY BASICS LIKE FOOD, HOUSING, MEDICAL CARE, AND HEATING?: NOT HARD AT ALL

## 2021-12-10 ASSESSMENT — LIFESTYLE VARIABLES: HOW OFTEN DO YOU HAVE A DRINK CONTAINING ALCOHOL: 0

## 2021-12-10 NOTE — PROGRESS NOTES
management, Murray Carrera PA-C [place]    COVID-19 09/29/2021    Essential hypertension     GERD (gastroesophageal reflux disease)     H/O percutaneous left heart catheterization 12/13/2016    False positive. No CAD    History of nuclear stress test 12/06/2016    lexiscan-mild ischemia mid inferior,EF70%    Hx of echocardiogram 06/26/2017    EF 55-60%. Grade 1 diastolic dysfunction.     Hyperhomocysteinemia (HCC)     Moderate depressive disorder (HCC)     Morbid obesity (HCC)     Neuropathy     Normocytic anemia     Orthostasis     Osteoarthritis     Sleep apnea     Spinal stenosis of lumbar region        Past Surgical History:   Procedure Laterality Date    CATARACT REMOVAL  2008    CHOLECYSTECTOMY      EYE SURGERY      cataracts    GASTRIC BYPASS SURGERY  2010    HYSTERECTOMY      JOINT REPLACEMENT Left 09/17/2018    [place]    JOINT REPLACEMENT Right 2003    Dr. Oswald Davis  2006    total lt shoulder     SHOULDER SURGERY  2006    total rt shoulder    TOTAL KNEE ARTHROPLASTY Right     TOTAL KNEE ARTHROPLASTY  02/2011    left         Family History   Problem Relation Age of Onset    Cancer Mother     High Blood Pressure Mother     Stroke Mother     Heart Disease Father     High Blood Pressure Father     Cancer Maternal Aunt     Diabetes type 2  Daughter     Coronary Art Dis Daughter     Obesity Daughter     COPD Daughter     No Known Problems Daughter     No Known Problems Son     No Known Problems Son     No Known Problems Son     No Known Problems Son     No Known Problems Son     No Known Problems Son     No Known Problems Sister     No Known Problems Brother     No Known Problems Sister     No Known Problems Sister     No Known Problems Sister     No Known Problems Brother        CareTeam (Including outside providers/suppliers regularly involved in providing care):   Patient Care Team:  Danuta Stuart as PCP - General (Physician Assistant)  Jasbir Perez PA-C as PCP - [facility] Empaneled Provider  Boo Pretzel, RN as Care Transitions Nurse    Wt Readings from Last 3 Encounters:   09/30/21 225 lb 1.4 oz (102.1 kg)   09/29/21 217 lb (98.4 kg)   08/18/21 224 lb (101.6 kg)     There were no vitals filed for this visit. There is no height or weight on file to calculate BMI. Based upon direct observation of the patient, evaluation of cognition reveals recent and remote memory intact. Patient's complete Health Risk Assessment and screening values have been reviewed and are found in Flowsheets. The following problems were reviewed today and where indicated follow up appointments were made and/or referrals ordered. Positive Risk Factor Screenings with Interventions:       Depression:  PHQ-2 Score: 6  PHQ-9 Total Score: 23    Severity:1-4 = minimal depression, 5-9 = mild depression, 10-14 = moderate depression, 15-19 = moderately severe depression, 20-27 = severe depression  Depression Interventions:  · LPN INTERVENTION GUIDE: SCORE 15 OR ABOVE = MODERATE TO SEVERE DEPRESSION: PCP CONSULTED DURING VISIT-PCP consulted. Patient's score is 23, states her daughter passed away one week ago today. Patient denies suicidal ideation, declined office visit with PCP. Patient states she does not want to be medicated and \"feel drugged up\". Her other daughter was with her at the time of our visit. She was not crying, just trying to cope with this terrible loss. Encouraged patient to contact the office for any needs she may have. Patient verbalized understanding. General Health and ACP:  General  In general, how would you say your health is?: Very Good  In the past 7 days, have you experienced any of the following?  New or Increased Pain, New or Increased Fatigue, Loneliness, Social Isolation, Stress or Anger?: (!) Loneliness, Stress, Anger  Do you get the social and emotional support that you need?: Yes  Do you have a Living Will?: Yes  Advance Directives     Power of  Living Will ACP-Advance Directive ACP-Power of     Not on File Not on File Not on File Not on [address] Interventions:  · Loneliness: patient's comments regarding inadequate social support: Patient states she feels lonely since she is missing her daughter  · Stress: patient's comments regarding reasons for stress and/or anger: Patient states stress due to her daughter passing away last week  · Anger: patient's comments regarding reasons for stress and/or anger: Patient states she does feels some anger due to her daughter's passing  · No Living Will: ACP documents already completed- patient asked to provide copy to the office    Health Habits/Nutrition:  Health Habits/Nutrition  Do you exercise for at least 20 minutes 2-3 times per week?: Yes  Have you lost any weight without trying in the past 3 months?: No  Do you eat only one meal per day?: No  Have you seen the dentist within the past year?: (!) No     Health Habits/Nutrition Interventions:  · Dental exam overdue:  patient encouraged to make appointment with his/her dentist    Hearing/Vision:  No exam data present  Hearing/Vision  Do you or your family notice any trouble with your hearing that hasn't been managed with hearing aids?: No  Do you have difficulty driving, watching TV, or doing any of your daily activities because of your eyesight?: (!) Yes  Have you had an eye exam within the past year?: Yes  Hearing/Vision Interventions:  · Vision concerns:  Patient states she does not drive at night      Personalized Preventive Plan   Current Health Maintenance Status  Immunization History   Administered Date(s) Administered    Influenza Vaccine, unspecified formulation 11/05/2010, 09/15/2011, 10/03/2017    Influenza Virus Vaccine 11/05/2010, 09/15/2011, 12/07/2015, 10/03/2017    Influenza, High Dose (Fluzone 65 yrs and older) 06/19/2013, 09/22/2014, 09/24/2015, 10/11/2016, 09/06/2017, 09/20/2018, 10/01/2018, 10/11/2021    Influenza, Quadv, adjuvanted, 65 yrs +, IM, PF (Fluad) 11/02/2020    Influenza, Triv, inactivated, subunit, adjuvanted, IM (Fluad 65 yrs and older) 10/30/2019    Pneumococcal Conjugate 13-valent ([id number]) 12/13/2015    Pneumococcal Polysaccharide ([id number]) 05/02/2016    Tdap (Boostrix, Adacel) 10/13/2017        Health Maintenance   Topic Date Due    Annual Wellness Visit (AWV)  Never done    Shingles Vaccine (1 of 2) 01/01/2100 (Originally 2/20/1995)    COVID-19 Vaccine (1) 01/01/2100 (Originally 2/20/1957)    Potassium monitoring  09/29/2022    Creatinine monitoring  09/29/2022    DTaP/Tdap/Td vaccine (2 - Td or Tdap) 10/13/2027    DEXA (modify frequency per FRAX score)  Completed    Flu vaccine  Completed    Pneumococcal 65+ years Vaccine  Completed    Hepatitis C screen  Completed    Hepatitis A vaccine  Aged Out    Hib vaccine  Aged Out    Meningococcal (ACWY) vaccine  Aged Out     Recommendations for Numerate Due: see orders and patient instructions/AVS.     Unable to obtain 3 vital signs due to patient not having equipment to take blood pressure/temperature. Recommended screening schedule for the next 5-10 years is provided to the patient in written form: see Patient Instructions/AVS.    Amy FRIAS LPN, 54/55/8750, performed the documented evaluation under the direct supervision of the attending physician. Miriam Tierney, was evaluated through a synchronous (real-time) audio encounter. The patient (or guardian if applicable) is aware that this is a billable service. Verbal consent to proceed has been obtained within the past 12 months. The visit was conducted pursuant to the emergency declaration under the [address] [place] [address], [address] [place] [address] authority and the [employer] and [employer] time[employer] General Act. Patient identification was verified, and a caregiver was present when appropriate.  The patient was located in the state of [place], where the provider was credentialed to provide care. Total time spent for this encounter: Not billed by time    --Makayla Baez LPN on 52/29/7650 at 10:21 AM    An electronic signature was used to authenticate this note. This encounter was performed under Courtney fuller MDs, direct supervision, 12/10/2021.

## 2021-12-10 NOTE — PATIENT INSTRUCTIONS
Personalized Preventive Plan for Danni Williamson - 12/10/2021  Medicare offers a range of preventive health benefits. Some of the tests and screenings are paid in full while other may be subject to a deductible, co-insurance, and/or copay. Some of these benefits include a comprehensive review of your medical history including lifestyle, illnesses that may run in your family, and various assessments and screenings as appropriate. After reviewing your medical record and screening and assessments performed today your provider may have ordered immunizations, labs, imaging, and/or referrals for you. A list of these orders (if applicable) as well as your Preventive Care list are included within your After Visit Summary for your review. Other Preventive Recommendations:    · A preventive eye exam performed by an eye specialist is recommended every 1-2 years to screen for glaucoma; cataracts, macular degeneration, and other eye disorders. · A preventive dental visit is recommended every 6 months. · Try to get at least 150 minutes of exercise per week or 10,000 steps per day on a pedometer . · Order or download the FREE \"Exercise & Physical Activity: Your Everyday Guide\" from [employer]. Call 9-964.296.6570 or search [employer] online. · You need 7487-5010 mg of calcium and 7858-4128 IU of vitamin D per day. It is possible to meet your calcium requirement with diet alone, but a vitamin D supplement is usually necessary to meet this goal.  · When exposed to the sun, use a sunscreen that protects against both UVA and UVB radiation with an SPF of 30 or greater. Reapply every 2 to 3 hours or after sweating, drying off with a towel, or swimming. · Always wear a seat belt when traveling in a car. Always wear a helmet when riding a bicycle or motorcycle.

## 2021-12-30 ENCOUNTER — CARE COORDINATION (OUTPATIENT)
Dept: CASE MANAGEMENT | Age: 76
End: 2021-12-30

## 2022-02-09 DIAGNOSIS — F41.1 GAD (GENERALIZED ANXIETY DISORDER): ICD-10-CM

## 2022-02-09 DIAGNOSIS — M48.062 SPINAL STENOSIS OF LUMBAR REGION WITH NEUROGENIC CLAUDICATION: ICD-10-CM

## 2022-02-09 RX ORDER — CELECOXIB 200 MG/1
CAPSULE ORAL
Qty: 180 CAPSULE | Refills: 0 | Status: SHIPPED | OUTPATIENT
Start: 2022-02-09 | End: 2022-03-30

## 2022-02-09 RX ORDER — HYDROXYZINE HYDROCHLORIDE 25 MG/1
TABLET, FILM COATED ORAL
Qty: 60 TABLET | Refills: 2 | Status: SHIPPED | OUTPATIENT
Start: 2022-02-09 | End: 2022-03-30 | Stop reason: SDUPTHER

## 2022-03-30 ENCOUNTER — OFFICE VISIT (OUTPATIENT)
Dept: FAMILY MEDICINE CLINIC | Age: 77
End: 2022-03-30
Payer: MEDICARE

## 2022-03-30 ENCOUNTER — TELEPHONE (OUTPATIENT)
Dept: FAMILY MEDICINE CLINIC | Age: 77
End: 2022-03-30

## 2022-03-30 VITALS
DIASTOLIC BLOOD PRESSURE: 80 MMHG | RESPIRATION RATE: 18 BRPM | SYSTOLIC BLOOD PRESSURE: 110 MMHG | HEART RATE: 94 BPM | OXYGEN SATURATION: 93 % | BODY MASS INDEX: 41 KG/M2 | TEMPERATURE: 97.8 F | HEIGHT: 62 IN | WEIGHT: 222.8 LBS

## 2022-03-30 DIAGNOSIS — F32.A MODERATE DEPRESSIVE DISORDER: ICD-10-CM

## 2022-03-30 DIAGNOSIS — G45.9 TIA (TRANSIENT ISCHEMIC ATTACK): ICD-10-CM

## 2022-03-30 DIAGNOSIS — R29.90 STROKE-LIKE SYMPTOMS: ICD-10-CM

## 2022-03-30 DIAGNOSIS — D64.9 NORMOCYTIC ANEMIA: ICD-10-CM

## 2022-03-30 DIAGNOSIS — E11.9 TYPE 2 DIABETES MELLITUS WITHOUT COMPLICATION, WITHOUT LONG-TERM CURRENT USE OF INSULIN (HCC): ICD-10-CM

## 2022-03-30 DIAGNOSIS — M16.12 PRIMARY OSTEOARTHRITIS OF LEFT HIP: ICD-10-CM

## 2022-03-30 DIAGNOSIS — M48.062 SPINAL STENOSIS OF LUMBAR REGION WITH NEUROGENIC CLAUDICATION: Primary | ICD-10-CM

## 2022-03-30 DIAGNOSIS — F41.1 GAD (GENERALIZED ANXIETY DISORDER): ICD-10-CM

## 2022-03-30 DIAGNOSIS — N18.31 STAGE 3A CHRONIC KIDNEY DISEASE (HCC): ICD-10-CM

## 2022-03-30 DIAGNOSIS — J06.9 VIRAL URI: ICD-10-CM

## 2022-03-30 DIAGNOSIS — E66.01 MORBID OBESITY WITH BMI OF 40.0-44.9, ADULT (HCC): ICD-10-CM

## 2022-03-30 DIAGNOSIS — J30.2 SEASONAL ALLERGIC RHINITIS, UNSPECIFIED TRIGGER: ICD-10-CM

## 2022-03-30 DIAGNOSIS — Z98.84 STATUS POST GASTRIC BYPASS FOR OBESITY: ICD-10-CM

## 2022-03-30 DIAGNOSIS — J45.30 MILD PERSISTENT ASTHMA, UNSPECIFIED WHETHER COMPLICATED: ICD-10-CM

## 2022-03-30 DIAGNOSIS — K21.9 GASTROESOPHAGEAL REFLUX DISEASE WITHOUT ESOPHAGITIS: ICD-10-CM

## 2022-03-30 DIAGNOSIS — N32.81 OVERACTIVE BLADDER: ICD-10-CM

## 2022-03-30 PROBLEM — E66.9 CLASS 2 OBESITY IN ADULT: Status: RESOLVED | Noted: 2018-08-02 | Resolved: 2022-03-30

## 2022-03-30 PROBLEM — R20.2 PARESTHESIAS IN RIGHT HAND: Status: RESOLVED | Noted: 2018-08-02 | Resolved: 2022-03-30

## 2022-03-30 PROBLEM — E72.11 HYPERHOMOCYSTEINEMIA (HCC): Status: RESOLVED | Noted: 2017-09-19 | Resolved: 2022-03-30

## 2022-03-30 PROBLEM — E66.812 CLASS 2 OBESITY IN ADULT: Status: RESOLVED | Noted: 2018-08-02 | Resolved: 2022-03-30

## 2022-03-30 LAB
BASOPHILS ABSOLUTE: 0 K/UL (ref 0–0.2)
BASOPHILS RELATIVE PERCENT: 0.4 %
EOSINOPHILS ABSOLUTE: 0.1 K/UL (ref 0–0.6)
EOSINOPHILS RELATIVE PERCENT: 1.6 %
HCT VFR BLD CALC: 37.6 % (ref 36–48)
HEMOGLOBIN: 12.3 G/DL (ref 12–16)
LYMPHOCYTES ABSOLUTE: 1.9 K/UL (ref 1–5.1)
LYMPHOCYTES RELATIVE PERCENT: 21.5 %
MCH RBC QN AUTO: 27.3 PG (ref 26–34)
MCHC RBC AUTO-ENTMCNC: 32.7 G/DL (ref 31–36)
MCV RBC AUTO: 83.4 FL (ref 80–100)
MONOCYTES ABSOLUTE: 0.6 K/UL (ref 0–1.3)
MONOCYTES RELATIVE PERCENT: 6.4 %
NEUTROPHILS ABSOLUTE: 6.3 K/UL (ref 1.7–7.7)
NEUTROPHILS RELATIVE PERCENT: 70.1 %
PDW BLD-RTO: 15.6 % (ref 12.4–15.4)
PLATELET # BLD: 356 K/UL (ref 135–450)
PMV BLD AUTO: 7.6 FL (ref 5–10.5)
RBC # BLD: 4.5 M/UL (ref 4–5.2)
WBC # BLD: 9 K/UL (ref 4–11)

## 2022-03-30 PROCEDURE — 36415 COLL VENOUS BLD VENIPUNCTURE: CPT | Performed by: FAMILY MEDICINE

## 2022-03-30 PROCEDURE — 99215 OFFICE O/P EST HI 40 MIN: CPT | Performed by: FAMILY MEDICINE

## 2022-03-30 PROCEDURE — G8400 PT W/DXA NO RESULTS DOC: HCPCS | Performed by: FAMILY MEDICINE

## 2022-03-30 PROCEDURE — 1090F PRES/ABSN URINE INCON ASSESS: CPT | Performed by: FAMILY MEDICINE

## 2022-03-30 PROCEDURE — G8417 CALC BMI ABV UP PARAM F/U: HCPCS | Performed by: FAMILY MEDICINE

## 2022-03-30 PROCEDURE — 1123F ACP DISCUSS/DSCN MKR DOCD: CPT | Performed by: FAMILY MEDICINE

## 2022-03-30 PROCEDURE — 4040F PNEUMOC VAC/ADMIN/RCVD: CPT | Performed by: FAMILY MEDICINE

## 2022-03-30 PROCEDURE — G8427 DOCREV CUR MEDS BY ELIG CLIN: HCPCS | Performed by: FAMILY MEDICINE

## 2022-03-30 PROCEDURE — G8482 FLU IMMUNIZE ORDER/ADMIN: HCPCS | Performed by: FAMILY MEDICINE

## 2022-03-30 PROCEDURE — 1036F TOBACCO NON-USER: CPT | Performed by: FAMILY MEDICINE

## 2022-03-30 RX ORDER — FLUTICASONE PROPIONATE 50 MCG
1 SPRAY, SUSPENSION (ML) NASAL DAILY
Qty: 16 G | Refills: 5 | Status: SHIPPED | OUTPATIENT
Start: 2022-03-30 | End: 2022-06-22

## 2022-03-30 RX ORDER — SERTRALINE HYDROCHLORIDE 100 MG/1
100 TABLET, FILM COATED ORAL NIGHTLY
Qty: 90 TABLET | Refills: 1 | Status: SHIPPED | OUTPATIENT
Start: 2022-03-30 | End: 2022-08-09 | Stop reason: SDUPTHER

## 2022-03-30 RX ORDER — FLUTICASONE PROPIONATE 50 MCG
1 SPRAY, SUSPENSION (ML) NASAL DAILY
COMMUNITY
End: 2022-03-30 | Stop reason: SDUPTHER

## 2022-03-30 RX ORDER — OXYCODONE AND ACETAMINOPHEN 7.5; 325 MG/1; MG/1
1 TABLET ORAL EVERY 4 HOURS PRN
COMMUNITY
End: 2022-03-30 | Stop reason: SDUPTHER

## 2022-03-30 RX ORDER — FLUTICASONE FUROATE AND VILANTEROL TRIFENATATE 100; 25 UG/1; UG/1
1 POWDER RESPIRATORY (INHALATION) DAILY
Qty: 3 EACH | Refills: 3 | Status: SHIPPED | OUTPATIENT
Start: 2022-03-30 | End: 2022-08-09 | Stop reason: SDUPTHER

## 2022-03-30 RX ORDER — GABAPENTIN 300 MG/1
300 CAPSULE ORAL 3 TIMES DAILY
COMMUNITY
End: 2022-03-30

## 2022-03-30 RX ORDER — ALBUTEROL SULFATE 90 UG/1
2 AEROSOL, METERED RESPIRATORY (INHALATION) EVERY 6 HOURS PRN
Qty: 1 EACH | Refills: 3 | Status: SHIPPED | OUTPATIENT
Start: 2022-03-30

## 2022-03-30 RX ORDER — HYDROXYZINE HYDROCHLORIDE 25 MG/1
25 TABLET, FILM COATED ORAL EVERY 8 HOURS PRN
Qty: 60 TABLET | Refills: 2 | Status: SHIPPED | OUTPATIENT
Start: 2022-03-30 | End: 2022-10-11 | Stop reason: SDUPTHER

## 2022-03-30 RX ORDER — FLUTICASONE FUROATE AND VILANTEROL TRIFENATATE 100; 25 UG/1; UG/1
POWDER RESPIRATORY (INHALATION) DAILY
COMMUNITY
End: 2022-03-30 | Stop reason: SDUPTHER

## 2022-03-30 RX ORDER — OXYCODONE AND ACETAMINOPHEN 7.5; 325 MG/1; MG/1
1 TABLET ORAL 2 TIMES DAILY PRN
Qty: 60 TABLET | Refills: 0 | Status: SHIPPED | OUTPATIENT
Start: 2022-03-30 | End: 2022-04-27 | Stop reason: SDUPTHER

## 2022-03-30 RX ORDER — AMITRIPTYLINE HYDROCHLORIDE 50 MG/1
50 TABLET, FILM COATED ORAL NIGHTLY
Qty: 90 TABLET | Refills: 1 | Status: SHIPPED | OUTPATIENT
Start: 2022-03-30 | End: 2022-04-27 | Stop reason: SDUPTHER

## 2022-03-30 RX ORDER — OMEPRAZOLE 20 MG/1
20 CAPSULE, DELAYED RELEASE ORAL 2 TIMES DAILY PRN
Qty: 180 CAPSULE | Refills: 1 | Status: SHIPPED | OUTPATIENT
Start: 2022-03-30 | End: 2022-08-09 | Stop reason: SDUPTHER

## 2022-03-30 ASSESSMENT — ENCOUNTER SYMPTOMS
SHORTNESS OF BREATH: 0
WHEEZING: 0
DIARRHEA: 0
COUGH: 0
VOMITING: 0
NAUSEA: 0
CONSTIPATION: 0
BACK PAIN: 1

## 2022-03-30 NOTE — TELEPHONE ENCOUNTER
Patient's daughter called stating patinet is needing refill of omeprazole and requip.  Patient uses [facility] on [address]

## 2022-03-30 NOTE — PROGRESS NOTES
3/30/22    Jerry Greenwood  1945    SUBJECTIVE    HPI - Deoon Kassie is a 68 y.o. female who presents today for evaluation of:  Chief Complaint   Patient presents with    New Patient     Establish care    Migraine     daily    Dizziness     PreDM : Many years ago had DM and was up to 325#. Formerly was diabetic but got better with bypass surgery. Bypass surgery 20 yr ago. Mood :   a couple years aog and hs ehas been living with daughters. One grandson is autistic which can be annoying. Amitriptyline helps her sleep and she feels she needs 100mg rather than a smaller amount. CKD 3a : she is unaware of it. Lumbar stenosis : Takes celecoxib 100mg nightly but it makes her sick. She had been taking oxycodone 7.5 mg up to qid. ;She has not had any since another doctor lost ability to prescribe. She has been without it for a month or so. Sometimes pain is severe. She thinks she could get by with 2 tabs a day. Diarrhea : gets diarrhea after milk or butter. Review of Systems   Constitutional: Positive for fatigue. Negative for fever. Respiratory: Negative for cough, shortness of breath and wheezing. Cardiovascular: Negative for chest pain and palpitations. Gastrointestinal: Negative for constipation, diarrhea, nausea and vomiting. Musculoskeletal: Positive for back pain and neck pain (mostly in back and down both legs. ). Psychiatric/Behavioral: Positive for dysphoric mood. Negative for suicidal ideas. The patient is nervous/anxious (infrequent). Fasting: Yes    Allergies   Allergen Reactions    Latex     Prednisone Hives        OBJECTIVE    /80 (Site: Left Upper Arm, Position: Sitting, Cuff Size: Medium Adult)   Pulse 94   Temp 97.8 °F (36.6 °C) (Infrared)   Resp 18   Ht 5' 1.75\" (1.568 m)   Wt 222 lb 12.8 oz (101.1 kg)   SpO2 93%   BMI 41.08 kg/m²     Physical Exam   Constitutional:       General: Not in acute distress. Appearance: Normal appearance.  Not ill-appearing. Eyes:      General: No scleral icterus. Cardiovascular:      Rate and Rhythm: Normal rate and regular rhythm. Heart sounds: No murmur heard. No friction rub. No gallop. Pulmonary:      Effort: Pulmonary effort is normal. No respiratory distress. Breath sounds: No wheezing, rhonchi or rales. Abdominal:      Palpations: Abdomen is soft. There is no mass. Tenderness: There is no abdominal tenderness. Musculoskeletal:     Moves all extremities normally. Skin:     General: Skin is warm. Coloration: Skin is not jaundiced. Neurological:      Mental Status: Patient is alert. Psychiatric:         Behavior: Behavior normal.         Thought Content: Thought content normal.         Judgment: Judgment normal.  Feet:      Right foot:      Skin integrity: No ulcer or skin breakdown. No deformity or bunion. Left foot:      Skin integrity: No ulcer or skin breakdown. No deformity or bunion. Pulses:           Dorsalis pedis pulses are 2+ on the right side and 2+ on the left side. Posterior tibial pulses are 2+ on the right side and 2+ on the left side. Sensation:     Right foot: Normal monofilament sense on 5 toes, medial and lateral plantar MTPJ area, and heel. Normal vibratory sense right great toe. Left foot: Normal monofilament sense on 5 toes, medial and lateral plantar MTPJ area, and heel. Normal vibratory sense left great toe. ASSESSMENT/PLAN:    1. Spinal stenosis of lumbar region with neurogenic claudication  Assessment & Plan:   Continuing oxycodone as described under primary osteoarthritis of left hip. 2. Status post gastric bypass for obesity  Assessment & Plan:   I will check for vitamin B12 or folate vitamin deficiency. Orders:  -     Vitamin B12 & Folate  3. Moderate depressive disorder (HCC)  Assessment & Plan:   Continue Zoloft and amitriptyline for depression.   I will reduce the amitriptyline to 50 mg since she states that she had been taking 100 mg. Orders:  -     amitriptyline (ELAVIL) 50 MG tablet; Take 1 tablet by mouth nightly, Disp-90 tablet, R-1Normal  4. Type 2 diabetes mellitus without complication, without long-term current use of insulin ([facility] [address]  Assessment & Plan:   She describes having had diabetes in the past but indicates that it went away when she had bariatric surgery which is quite possible. Orders:  -     Hemoglobin A1C  -     Microalbumin / Creatinine Urine Ratio  -      DIABETES FOOT EXAM  5. Stage 3a chronic kidney disease (HCC)  Assessment & Plan:   I will check a CMP level today to check kidney function. Orders:  -     Comprehensive Metabolic Panel  6. Overactive bladder  Assessment & Plan:   I will refill Myrbetriq to manage overactive bladder. Orders:  -     mirabegron (MYRBETRIQ) 25 MG TB24; Take 1 tablet by mouth daily, Disp-90 tablet, R-2Normal  7. CHARLIE (generalized anxiety disorder)  Assessment & Plan:   I will refill Zoloft and hydroxyzine for anxiety. Orders:  -     hydrOXYzine (ATARAX) 25 MG tablet; Take 1 tablet by mouth every 8 hours as needed for Anxiety, Disp-60 tablet, R-2Normal  -     sertraline (ZOLOFT) 100 MG tablet; Take 1 tablet by mouth nightly, Disp-90 tablet, R-1Normal  8. Normocytic anemia  Assessment & Plan:   I will check a CBC to monitor her anemia. Orders:  -     CBC with Auto Differential  9. Primary osteoarthritis of left hip  Assessment & Plan:   I checked PDMP and her last prescription for oxycodone stated 4 times a day and was in January. She feels she could reduce to 2 times a day. I feel more comfortable prescribing it at 2 times a day. She may take extra acetaminophen to help if needed. Maximum total dose of acetaminophen should be kept less than 3000 to 4000 mg daily. There are 650 mg of acetaminophen in the 2 pills of oxycodone daily. Orders:  -     oxyCODONE-acetaminophen (PERCOCET) 7.5-325 MG per tablet;  Take 1 tablet by mouth 2 times daily as needed for Pain for up to 30 days. , Disp-60 tablet, R-0Normal  10. Morbid obesity with BMI of 40.0-44.9, adult [facility])  Assessment & Plan:   She is status post bariatric surgery and has lost a great amount of weight in the past.  11. Stroke-like symptoms  12. TIA (transient ischemic attack)  Assessment & Plan:   She reports a history of TIA or strokelike symptoms. 13. Viral URI  -     albuterol sulfate HFA (VENTOLIN HFA) 108 (90 Base) MCG/ACT inhaler; Inhale 2 puffs into the lungs every 6 hours as needed for Wheezing, Disp-1 each, R-3Normal  14. Mild persistent asthma, unspecified whether complicated  -     fluticasone-vilanterol (BREO ELLIPTA) 100-25 MCG/INH AEPB inhaler; Inhale 1 puff into the lungs daily, Disp-3 each, R-3Normal  15. Seasonal allergic rhinitis, unspecified trigger  -     fluticasone (FLONASE) 50 MCG/ACT nasal spray; 1 spray by Each Nostril route daily, Disp-16 g, R-5Normal  16. Gastroesophageal reflux disease without esophagitis  Assessment & Plan:   Continue omeprazole for GERD. Orders:  -     omeprazole (PRILOSEC) 20 MG delayed release capsule; Take 1 capsule by mouth 2 times daily as needed (stomach symptoms), Disp-180 capsule, R-1Normal    Note that after the visit her daughter called and asked about Requip. Requip for restless legs did not come up in today's visit conversation but I think it would be reasonable to call in a low-dose of Requip if she or her daughter confirms that that is what she takes it for. Counseling provided for:  Healthy eating - Avoid sugar and other refined carbohydrates. , Eat more foods with fiber like vegetables and whole grain products. , Intermittent fasting: Pick one or two days each week to eat significantly less than usual. and Time restricted eating: Only eat between certain hours each day.  For example, Only eat if it is between 7am and 8am, between 12noon and 1pm, or between 6pm and 7pm.  >> Exercise - 1> try to do 150 minutes a week of exercise that is as hard as walking briskly (30 minutes 5 days a week or 22 minutes every day); and 2> do some strength training 2 or 3 times a week. Opioid Safety Documentation:   ^ PDMP was reviewed.  ^ He/She knows to keep the oxycodone in a secure place. ^ The pain relief is adequate.  ^ Other things have been tried but she/he does not feel that they help adequately. (celecoxib bothers stomach)  ^ Patient is aware of the sedative effects with controlled substances.   ^ I advised not mixing the controlled substances with EtOH or other CNS depressants.  ^ I am prescribing a lower dose thatn she has been taking a couple months ago. Return in about 4 weeks (around 4/27/2022) for back pain (oxycodone) likely needs UDS and pain contract form. .     51 minutes were spent this calendar day in pre-charting and chart review; with the patient doing history, exam, medical decision making, and counseling; and completing orders and documentation.     Danilo [place] MD

## 2022-03-30 NOTE — PATIENT INSTRUCTIONS
Patient Education        Kidney Disease and Diabetes: Care Instructions  Overview     When you have diabetes, your body cannot make enough insulin or use it the wayit should. Your body needs insulin to help sugar move from the blood to the cells. Withoutit, your blood sugar gets too high. High blood sugar damages your kidneys and makes it hard for them to filterblood. This causes fluid and waste to build up in your blood. If you have diabetes, it is very important to keep your blood sugar in your target range. There are many steps you can take to do this. If you can control your blood sugar, you will have the best chance to slow or stop damage to yourkidneys. Follow-up care is a key part of your treatment and safety. Be sure to make and go to all appointments, and call your doctor if you are having problems. It's also a good idea to know your test results and keep alist of the medicines you take. How can you care for yourself at home? To manage your diabetes and slow or stop damage to your kidneys   Keep your blood sugar in your target range. The American Diabetes Association recommends a hemoglobin A1c (Hb A1c) target level of less than 7%. Talk to your doctor about your target. The lower your A1c, the better your chance of stopping kidney damage.  Keep your blood pressure in your target range. Doctors recommend specific types of blood pressure medicines for people who have diabetes and kidney disease. Examples include ACE inhibitors and angiotensin II receptor blockers (ARBs). Your doctor may have you take one of these even if you don't have high blood pressure.  Take all of your medicines. You may have several. For example, you may take medicines for diabetes, cholesterol, and blood pressure. It's very important to take all of them just as your doctor tells you to and to keep taking them.  Make good food choices. Follow an eating plan that is best for your diabetes and your kidneys.  You may want to work with a dietitian to make a plan. This will help you know how much carbohydrate to have for meals and snacks. It will also make sure that you get the right amount of salt (sodium), fluids, and protein.  Stay at a healthy weight. If you need help to lose weight, talk to your doctor or dietitian. Even small changes can make a difference. Try to be aware of your portion sizes, eat more fruits and vegetables, and add some activity to your daily routine.  Exercise. Get at least 30 minutes of activity on most days of the week. Walking is a great exercise that most people can do. Being more active can help you manage your blood sugar and stay at a healthy weight. It also can help you lower cholesterol and blood pressure. To improve your kidney health   Follow your treatment plan. Check your blood sugar as many times a day as your doctor recommends. Go to all of your follow-up appointments, and be sure to have all the tests your doctor orders. Call your doctor if you think you are having a problem with your medicines.  Avoid certain medicines. Nonsteroidal anti-inflammatory drugs (NSAIDs), such as ibuprofen and naproxen, can damage your kidneys. It is important to talk to your doctor about all medicine that you take.  Avoid tobacco. Do not smoke or use other tobacco products. If you need help quitting, talk to your doctor about stop-smoking programs and medicines. These can increase your chances of quitting for good. When should you call for help? Call 911 anytime you think you may need emergency care. For example, call if:     You passed out (lost consciousness).    Call your doctor now or seek immediate medical care if:     You have new or worse nausea and vomiting.      You have much less urine than normal, or you have no urine.      You are feeling confused or cannot think clearly.      You have new or more blood in your urine.      You have new swelling.      You are dizzy or lightheaded, or you feel like you may faint. Watch closely for changes in your health, and be sure to contact your doctor if:     You do not get better as expected. Where can you learn more? Go to https://[employer]360T.Impressto. org and sign in to your [employer] account. Enter C726 in the [employer] box to learn more about \"Kidney Disease and Diabetes: Care Instructions. \"     If you do not have an account, please click on the \"Sign Up Now\" link. Current as of: September 8, 2021               Content Version: 13.2  © 2904-7908 Healthwise, Incorporated. Care instructions adapted under license by [facility] ([facility]). If you have questions about a medical condition or this instruction, always ask your healthcare professional. [address] any warranty or liability for your use of this information.

## 2022-03-30 NOTE — ASSESSMENT & PLAN NOTE
She describes having had diabetes in the past but indicates that it went away when she had bariatric surgery which is quite possible.

## 2022-03-30 NOTE — TELEPHONE ENCOUNTER
I sent in a prescription of the omeprazole. I need a clarification on the reason she takes Requip and the dose to call that in.

## 2022-03-30 NOTE — ASSESSMENT & PLAN NOTE
Continue Zoloft and amitriptyline for depression. I will reduce the amitriptyline to 50 mg since she states that she had been taking 100 mg.

## 2022-03-30 NOTE — ASSESSMENT & PLAN NOTE
I checked PDMP and her last prescription for oxycodone stated 4 times a day and was in January. She feels she could reduce to 2 times a day. I feel more comfortable prescribing it at 2 times a day. She may take extra acetaminophen to help if needed. Maximum total dose of acetaminophen should be kept less than 3000 to 4000 mg daily. There are 650 mg of acetaminophen in the 2 pills of oxycodone daily.

## 2022-03-31 LAB
A/G RATIO: 1.4 (ref 1.1–2.2)
ALBUMIN SERPL-MCNC: 4 G/DL (ref 3.4–5)
ALP BLD-CCNC: 101 U/L (ref 40–129)
ALT SERPL-CCNC: 13 U/L (ref 10–40)
ANION GAP SERPL CALCULATED.3IONS-SCNC: 16 MMOL/L (ref 3–16)
AST SERPL-CCNC: 18 U/L (ref 15–37)
BILIRUB SERPL-MCNC: <0.2 MG/DL (ref 0–1)
BUN BLDV-MCNC: 19 MG/DL (ref 7–20)
CALCIUM SERPL-MCNC: 8.9 MG/DL (ref 8.3–10.6)
CHLORIDE BLD-SCNC: 108 MMOL/L (ref 99–110)
CO2: 20 MMOL/L (ref 21–32)
CREAT SERPL-MCNC: 1.1 MG/DL (ref 0.6–1.2)
ESTIMATED AVERAGE GLUCOSE: 122.6 MG/DL
FOLATE: 17.01 NG/ML (ref 4.78–24.2)
GFR AFRICAN AMERICAN: 58
GFR NON-AFRICAN AMERICAN: 48
GLUCOSE BLD-MCNC: 114 MG/DL (ref 70–99)
HBA1C MFR BLD: 5.9 %
POTASSIUM SERPL-SCNC: 4.3 MMOL/L (ref 3.5–5.1)
SODIUM BLD-SCNC: 144 MMOL/L (ref 136–145)
TOTAL PROTEIN: 6.8 G/DL (ref 6.4–8.2)
VITAMIN B-12: 983 PG/ML (ref 211–911)

## 2022-04-08 RX ORDER — ROPINIROLE 0.5 MG/1
0.5 TABLET, FILM COATED ORAL 3 TIMES DAILY
Qty: 270 TABLET | Refills: 0 | Status: SHIPPED | OUTPATIENT
Start: 2022-04-08 | End: 2022-06-22 | Stop reason: SDUPTHER

## 2022-04-27 ENCOUNTER — OFFICE VISIT (OUTPATIENT)
Dept: FAMILY MEDICINE CLINIC | Age: 77
End: 2022-04-27
Payer: MEDICARE

## 2022-04-27 VITALS
TEMPERATURE: 97.1 F | OXYGEN SATURATION: 93 % | DIASTOLIC BLOOD PRESSURE: 88 MMHG | HEART RATE: 57 BPM | SYSTOLIC BLOOD PRESSURE: 120 MMHG | HEIGHT: 62 IN | WEIGHT: 220 LBS | BODY MASS INDEX: 40.48 KG/M2

## 2022-04-27 DIAGNOSIS — E66.01 MORBID OBESITY WITH BMI OF 40.0-44.9, ADULT (HCC): ICD-10-CM

## 2022-04-27 DIAGNOSIS — N18.31 STAGE 3A CHRONIC KIDNEY DISEASE (HCC): ICD-10-CM

## 2022-04-27 DIAGNOSIS — F32.A MODERATE DEPRESSIVE DISORDER: ICD-10-CM

## 2022-04-27 DIAGNOSIS — G45.9 TIA (TRANSIENT ISCHEMIC ATTACK): ICD-10-CM

## 2022-04-27 DIAGNOSIS — M16.0 PRIMARY OSTEOARTHRITIS OF BOTH HIPS: Primary | ICD-10-CM

## 2022-04-27 DIAGNOSIS — Z79.899 LONG-TERM USE OF HIGH-RISK MEDICATION: ICD-10-CM

## 2022-04-27 LAB
AMPHETAMINE SCREEN, URINE: ABNORMAL
BARBITURATE SCREEN URINE: ABNORMAL
BENZODIAZEPINE SCREEN, URINE: ABNORMAL
CANNABINOID SCREEN URINE: ABNORMAL
COCAINE METABOLITE SCREEN URINE: ABNORMAL
Lab: ABNORMAL
METHADONE SCREEN, URINE: ABNORMAL
OPIATE SCREEN URINE: ABNORMAL
OXYCODONE URINE: POSITIVE
PH UA: 6
PHENCYCLIDINE SCREEN URINE: ABNORMAL
PROPOXYPHENE SCREEN: ABNORMAL

## 2022-04-27 PROCEDURE — 1090F PRES/ABSN URINE INCON ASSESS: CPT | Performed by: FAMILY MEDICINE

## 2022-04-27 PROCEDURE — 1036F TOBACCO NON-USER: CPT | Performed by: FAMILY MEDICINE

## 2022-04-27 PROCEDURE — 1123F ACP DISCUSS/DSCN MKR DOCD: CPT | Performed by: FAMILY MEDICINE

## 2022-04-27 PROCEDURE — 99214 OFFICE O/P EST MOD 30 MIN: CPT | Performed by: FAMILY MEDICINE

## 2022-04-27 PROCEDURE — G8400 PT W/DXA NO RESULTS DOC: HCPCS | Performed by: FAMILY MEDICINE

## 2022-04-27 PROCEDURE — G8427 DOCREV CUR MEDS BY ELIG CLIN: HCPCS | Performed by: FAMILY MEDICINE

## 2022-04-27 PROCEDURE — G8417 CALC BMI ABV UP PARAM F/U: HCPCS | Performed by: FAMILY MEDICINE

## 2022-04-27 PROCEDURE — 4040F PNEUMOC VAC/ADMIN/RCVD: CPT | Performed by: FAMILY MEDICINE

## 2022-04-27 RX ORDER — OXYCODONE AND ACETAMINOPHEN 7.5; 325 MG/1; MG/1
1 TABLET ORAL EVERY 8 HOURS PRN
Qty: 90 TABLET | Refills: 0 | Status: SHIPPED | OUTPATIENT
Start: 2022-04-27 | End: 2022-05-25 | Stop reason: SDUPTHER

## 2022-04-27 RX ORDER — AMITRIPTYLINE HYDROCHLORIDE 75 MG/1
75 TABLET, FILM COATED ORAL NIGHTLY
Qty: 90 TABLET | Refills: 1 | Status: SHIPPED | OUTPATIENT
Start: 2022-04-27 | End: 2022-05-25 | Stop reason: SDUPTHER

## 2022-04-27 ASSESSMENT — ENCOUNTER SYMPTOMS
CONSTIPATION: 0
DIARRHEA: 0

## 2022-04-27 NOTE — ASSESSMENT & PLAN NOTE
She takes amitriptyline both for depression and for help with sleep. She states that on the 50 mg dose she was unable to sleep until perhaps 3 in the morning. I will increase the dose to the 75 mg dose.   (In the past she took 100 mg nightly.)

## 2022-04-27 NOTE — ASSESSMENT & PLAN NOTE
I will increase the oxycodone to 7.5 mg 3 times a day. In the past she took 7.5 mg 4 times a day. She did not tolerate a trial of twice a day. She is willing to try adding some extra Tylenol to see if that helps.

## 2022-04-27 NOTE — ASSESSMENT & PLAN NOTE
I verified on recent labs that she does have CKD stage IIIa. We will plan to monitor GFR in the future. Most recent GFR was about 45 which was down slightly compared to 1 last September in the 46s.

## 2022-04-27 NOTE — ASSESSMENT & PLAN NOTE
Her labile of the transient ischemic attack occurred when she lost consciousness during a severe illness of her . It is doubtful that it is a kind of TIA that would be helped with a cholesterol-lowering medication. Her cholesterol levels are excellent with an HDL over 80.

## 2022-04-27 NOTE — PROGRESS NOTES
4/27/22    Yasmani Show  1945    SUBJECTIVE    HPI - Stephania Hutton is a 68 y.o. female who presents today for evaluation of:  Chief Complaint   Patient presents with    1 Month Follow-Up     back pain, med refill    Other     would like pain med increased to where they were before and amitriptyline back to 100 mg cant sleep    hip OA : bilateral. She did not tolerate the reduction of oxycodone to bid. She formerly took it qid and is willing to try tid. No recent falls or clsoe falls. She makes a point ot keep active and walks around large stores. In the past she has used ibuprofen and acetaminophen which were not adequate in controlling her pain. Her pain control enables her to be more physically active. Insomnia : she really needs the 100mg amitriptyline. No clouding of memory or cognition. Hx of TIA : blacked out in hospital when  as very ill. It was diagnosed as TIA. She has not had an actual stroke. She has excellent HDL cholesterol. Review of Systems   Gastrointestinal: Negative for constipation and diarrhea. Musculoskeletal: Positive for arthralgias. Neurological: Negative for seizures, syncope, speech difficulty, light-headedness, numbness and headaches. Psychiatric/Behavioral: Negative for dysphoric mood. The patient is not nervous/anxious. Allergies   Allergen Reactions    Latex     Prednisone Hives        OBJECTIVE    /88 (Site: Left Upper Arm, Position: Sitting, Cuff Size: Medium Adult)   Pulse 57   Temp 97.1 °F (36.2 °C) (Infrared)   Ht 5' 1.75\" (1.568 m)   Wt 220 lb (99.8 kg)   SpO2 93%   BMI 40.57 kg/m²     Physical Exam   Constitutional:       General: Not in acute distress. Appearance: Normal appearance. Not ill-appearing. Eyes:      General: No scleral icterus. Cardiovascular:      Rate and Rhythm: Normal rate and regular rhythm. Heart sounds: No murmur heard. No friction rub. No gallop.     Pulmonary:      Effort: Pulmonary effort is normal. No respiratory distress. Breath sounds: No wheezing, rhonchi or rales. Abdominal:      Palpations: Abdomen is soft. There is no mass. Tenderness: There is no abdominal tenderness. Musculoskeletal:     Moves all extremities normally. Skin:     General: Skin is warm. Coloration: Skin is not jaundiced. Neurological:      Mental Status: Patient is alert. Psychiatric:         Behavior: Behavior normal.         Thought Content: Thought content normal.         Judgment: Judgment normal.      ASSESSMENT/PLAN:    1. Primary osteoarthritis of both hips  Assessment & Plan:   I will increase the oxycodone to 7.5 mg 3 times a day. In the past she took 7.5 mg 4 times a day. She did not tolerate a trial of twice a day. She is willing to try adding some extra Tylenol to see if that helps. Orders:  -     oxyCODONE-acetaminophen (PERCOCET) 7.5-325 MG per tablet; Take 1 tablet by mouth every 8 hours as needed for Pain for up to 30 days. , Disp-90 tablet, R-0Normal  -     Urine Drug Screen; Future  2. Moderate depressive disorder [facility])  Assessment & Plan:   She takes amitriptyline both for depression and for help with sleep. She states that on the 50 mg dose she was unable to sleep until perhaps 3 in the morning. I will increase the dose to the 75 mg dose. (In the past she took 100 mg nightly.)  Orders:  -     amitriptyline (ELAVIL) 75 MG tablet; Take 1 tablet by mouth nightly, Disp-90 tablet, R-1Normal  3. CKD, Stage 3a chronic kidney disease ([facility] [address]  Assessment & Plan:   I verified on recent labs that she does have CKD stage IIIa. We will plan to monitor GFR in the future. Most recent GFR was about 45 which was down slightly compared to 1 last September in the 46s. 4. TIA (transient ischemic attack)  Assessment & Plan:   Her labile of the transient ischemic attack occurred when she lost consciousness during a severe illness of her .   It is doubtful that it is a kind of TIA that would be helped with a cholesterol-lowering medication. Her cholesterol levels are excellent with an HDL over 80.  5. Morbid obesity with BMI of 40.0-44.9, adult [facility])  Assessment & Plan:   Encouraged healthy eating and avoiding sugar. 6. Long-term use of high-risk medication  Assessment & Plan:   Pain contract and consent signed 4/27/22. Orders:  -     Urine Drug Screen; Future        Counseling provided for:  Healthy eating - Avoid sugar and other refined carbohydrates. >> Exercise - 1> try to do 150 minutes a week of exercise that is as hard as walking briskly (30 minutes 5 days a week or 22 minutes every day); and 2> do some strength training 2 or 3 times a week. Return in about 4 weeks (around 5/25/2022) for Hip OA and insomnia and UDS.  Sherly Pryor MD

## 2022-04-27 NOTE — PATIENT INSTRUCTIONS
Patient Education        Hip Arthritis: Exercises  Introduction  Here are some examples of exercises for you to try. The exercises may be suggested for a condition or for rehabilitation. Start each exercise slowly. Ease off the exercises if you start to have pain. You will be told when to start these exercises and which ones will work bestfor you. How to do the exercises  Straight-leg raises to the outside    1. Lie on your side, with your affected hip on top. 2. Tighten the front thigh muscles of your top leg to keep your knee straight. 3. Keep your hip and your leg straight in line with the rest of your body, and keep your knee pointing forward. Do not drop your hip back. 4. Lift your top leg straight up toward the ceiling, about 12 inches off the floor. Hold for about 6 seconds, then slowly lower your leg. 5. Repeat 8 to 12 times. 6. Switch legs and repeat steps 1 through 5, even if only one hip is sore. Straight-leg raises to the inside    1. Lie on your side with your affected hip on the floor. 2. You can either prop up your other leg on a chair, or you can bend that knee and put that foot in front of your other knee. Do not drop your hip back. 3. Tighten the muscles on the front thigh of your bottom leg to straighten that knee. 4. Keep your kneecap pointing forward and your leg straight, and lift your bottom leg up toward the ceiling about 6 inches. Hold for about 6 seconds, then lower slowly. 5. Repeat 8 to 12 times. 6. Switch legs and repeat steps 1 through 5, even if only one hip is sore. Hip hike    1. Stand sideways on the bottom step of a staircase, and hold on to the banister or wall. 2. Keeping both knees straight, lift your good leg off the step and let it hang down. Then hike your good hip up to the same level as your affected hip or a little higher. 3. Repeat 8 to 12 times. 4. Switch legs and repeat steps 1 through 3, even if only one hip is sore. Bridging    1.  Lie on your back with both knees bent. Your knees should be bent about 90 degrees. 2. Then push your feet into the floor, squeeze your buttocks, and lift your hips off the floor until your shoulders, hips, and knees are all in a straight line. 3. Hold for about 6 seconds as you continue to breathe normally, and then slowly lower your hips back down to the floor and rest for up to 10 seconds. 4. Repeat 8 to 12 times. Hamstring stretch (lying down)    1. Lie flat on your back with your legs straight. If you feel discomfort in your back, place a small towel roll under your lower back. 2. Holding the back of your affected leg, lift your leg straight up and toward your body until you feel a stretch at the back of your thigh. 3. Hold the stretch for at least 30 seconds. 4. Repeat 2 to 4 times. 5. Switch legs and repeat steps 1 through 4, even if only one hip is sore. Standing quadriceps stretch    1. If you are not steady on your feet, hold on to a chair, counter, or wall. You can also lie on your stomach or your side to do this exercise. 2. Bend the knee of the leg you want to stretch, and reach behind you to grab the front of your foot or ankle with the hand on the same side. For example, if you are stretching your right leg, use your right hand. 3. Keeping your knees next to each other, pull your foot toward your buttock until you feel a gentle stretch across the front of your hip and down the front of your thigh. Your knee should be pointed directly to the ground, and not out to the side. 4. Hold the stretch for at least 15 to 30 seconds. 5. Repeat 2 to 4 times. 6. Switch legs and repeat steps 1 through 5, even if only one hip is sore. Hip rotator stretch    1. Lie on your back with both knees bent and your feet flat on the floor. 2. Put the ankle of your affected leg on your opposite thigh near your knee.   3. Use your hand to gently push your knee away from your body until you feel a gentle stretch around your hip.  4. Hold the stretch for 15 to 30 seconds. 5. Repeat 2 to 4 times. 6. Repeat steps 1 through 5, but this time use your hand to gently pull your knee toward your opposite shoulder. 7. Switch legs and repeat steps 1 through 6, even if only one hip is sore. Knee-to-chest    1. Lie on your back with your knees bent and your feet flat on the floor. 2. Bring your affected leg to your chest, keeping the other foot flat on the floor (or keeping the other leg straight, whichever feels better on your lower back). 3. Keep your lower back pressed to the floor. Hold for at least 15 to 30 seconds. 4. Relax, and lower the knee to the starting position. 5. Repeat 2 to 4 times. 6. Switch legs and repeat steps 1 through 5, even if only one hip is sore. 7. To get more stretch, put your other leg flat on the floor while pulling your knee to your chest.  Clamshell    1. Lie on your side, with your affected hip on top. Keep your feet and knees together and your knees bent. 2. Raise your top knee, but keep your feet together. Do not let your hips roll back. Your legs should open up like a clamshell. 3. Hold for 6 seconds. 4. Slowly lower your knee back down. Rest for 10 seconds. 5. Repeat 8 to 12 times. 6. Switch legs and repeat steps 1 through 5, even if only one hip is sore. Follow-up care is a key part of your treatment and safety. Be sure to make and go to all appointments, and call your doctor if you are having problems. It's also a good idea to know your test results and keep alist of the medicines you take. Where can you learn more? Go to https://Topic.[employer]. org and sign in to your [employer] account. Enter X627 in the Rewalon box to learn more about \"Hip Arthritis: Exercises. \"     If you do not have an account, please click on the \"Sign Up Now\" link. Current as of: July 1, 2021               Content Version: 13.2  © 2789-9486 Healthwise, Incorporated.    Care instructions adapted under license by [address] If you have questions about a medical condition or this instruction, always ask your healthcare professional. [place] [address] any warranty or liability for your use of this information.

## 2022-05-25 ENCOUNTER — OFFICE VISIT (OUTPATIENT)
Dept: FAMILY MEDICINE CLINIC | Age: 77
End: 2022-05-25
Payer: MEDICARE

## 2022-05-25 VITALS
OXYGEN SATURATION: 97 % | SYSTOLIC BLOOD PRESSURE: 120 MMHG | DIASTOLIC BLOOD PRESSURE: 80 MMHG | BODY MASS INDEX: 40.37 KG/M2 | HEIGHT: 62 IN | RESPIRATION RATE: 16 BRPM | WEIGHT: 219.4 LBS | HEART RATE: 77 BPM | TEMPERATURE: 97 F

## 2022-05-25 DIAGNOSIS — M16.0 PRIMARY OSTEOARTHRITIS OF BOTH HIPS: Primary | ICD-10-CM

## 2022-05-25 DIAGNOSIS — F32.A MODERATE DEPRESSIVE DISORDER: ICD-10-CM

## 2022-05-25 DIAGNOSIS — N18.31 STAGE 3A CHRONIC KIDNEY DISEASE (HCC): ICD-10-CM

## 2022-05-25 DIAGNOSIS — E66.01 MORBID OBESITY WITH BMI OF 40.0-44.9, ADULT (HCC): ICD-10-CM

## 2022-05-25 PROCEDURE — 99213 OFFICE O/P EST LOW 20 MIN: CPT | Performed by: FAMILY MEDICINE

## 2022-05-25 PROCEDURE — 36415 COLL VENOUS BLD VENIPUNCTURE: CPT | Performed by: FAMILY MEDICINE

## 2022-05-25 PROCEDURE — 1123F ACP DISCUSS/DSCN MKR DOCD: CPT | Performed by: FAMILY MEDICINE

## 2022-05-25 PROCEDURE — G8400 PT W/DXA NO RESULTS DOC: HCPCS | Performed by: FAMILY MEDICINE

## 2022-05-25 PROCEDURE — G8417 CALC BMI ABV UP PARAM F/U: HCPCS | Performed by: FAMILY MEDICINE

## 2022-05-25 PROCEDURE — 1090F PRES/ABSN URINE INCON ASSESS: CPT | Performed by: FAMILY MEDICINE

## 2022-05-25 PROCEDURE — G8427 DOCREV CUR MEDS BY ELIG CLIN: HCPCS | Performed by: FAMILY MEDICINE

## 2022-05-25 PROCEDURE — 1036F TOBACCO NON-USER: CPT | Performed by: FAMILY MEDICINE

## 2022-05-25 RX ORDER — OXYCODONE AND ACETAMINOPHEN 7.5; 325 MG/1; MG/1
1 TABLET ORAL EVERY 8 HOURS PRN
Qty: 90 TABLET | Refills: 0 | Status: SHIPPED | OUTPATIENT
Start: 2022-05-26 | End: 2022-06-22 | Stop reason: SDUPTHER

## 2022-05-25 RX ORDER — AMITRIPTYLINE HYDROCHLORIDE 100 MG/1
100 TABLET, FILM COATED ORAL NIGHTLY
Qty: 90 TABLET | Refills: 1 | Status: SHIPPED | OUTPATIENT
Start: 2022-05-25 | End: 2022-07-20 | Stop reason: ALTCHOICE

## 2022-05-25 NOTE — ASSESSMENT & PLAN NOTE
I will check a basic metabolic panel to monitor her kidney function. EGFR dropped from 54-48 between September 2021 and March 2022.

## 2022-05-25 NOTE — ASSESSMENT & PLAN NOTE
I will increase the Elavil back to 100 mg daily to help with her depression. She felt it was beneficial.  Also continue sertraline.

## 2022-05-25 NOTE — PATIENT INSTRUCTIONS
Patient Education        Recovering From Depression: Care Instructions  Your Care Instructions     Taking good care of yourself is important as you recover from depression. In time, your symptoms will fade as your treatment takes hold. Do not give up. Instead, focus your energy on getting better. Your mood will improve. It just takes some time. Focus on things that can help you feel better, such as being with friends and family, eating well, and getting enough rest. But take things slowly. Do not do too much too soon. Youwill begin to feel better gradually. Follow-up care is a key part of your treatment and safety. Be sure to make and go to all appointments, and call your doctor if you are having problems. It's also a good idea to know your test results and keep alist of the medicines you take. How can you care for yourself at home? Be realistic   If you have a large task to do, break it up into smaller steps you can handle, and just do what you can.  You may want to put off important decisions until your depression has lifted. If you have plans that will have a major impact on your life, such as marriage, divorce, or a job change, try to wait a bit. Talk it over with friends and loved ones who can help you look at the overall picture first.   Reaching out to people for help is important. Do not isolate yourself. Let your family and friends help you. Find someone you can trust and confide in, and talk to that person.  Be patient, and be kind to yourself. Remember that depression is not your fault and is not something you can overcome with willpower alone. Treatment is important for depression, just like for any other illness. Feeling better takes time, and your mood will improve little by little. Stay active   Stay busy and get outside. Take a walk, or try some other light exercise.  Talk with your doctor about an exercise program. Exercise can help with mild depression.  Go to a movie or concert. Take part in a Confucianist activity or other social gathering. Go to a [employer] game.  Ask a friend to have dinner with you. Take care of yourself   Eat a balanced diet with plenty of fresh fruits and vegetables, whole grains, and lean protein. If you have lost your appetite, eat small snacks rather than large meals.  Avoid using illegal drugs or marijuana and drinking alcohol. Do not take medicines that have not been prescribed for you. They may interfere with medicines you may be taking for depression, or they may make your depression worse.  Take your medicines exactly as they are prescribed. You may start to feel better within 1 to 3 weeks of taking antidepressant medicine. But it can take as many as 6 to 8 weeks to see more improvement. If you have questions or concerns about your medicines, or if you do not notice any improvement by 3 weeks, talk to your doctor.  Continue to take your medicine after your symptoms improve. Taking your medicine for at least 6 months after you feel better can help keep you from getting depressed again. If this isn't the first time you have been depressed, your doctor may recommend you to take medicine even longer.  If you have any side effects from your medicine, tell your doctor. Many side effects are mild and will go away on their own after you have been taking the medicine for a few weeks. Some may last longer. Talk to your doctor if side effects are bothering you too much. You might be able to try a different medicine.  Continue counseling. It may help prevent depression from returning, especially if you've had multiple episodes of depression. Talk with your counselor if you are having a hard time attending your sessions or you think the sessions aren't working. Don't just stop going.  Get enough sleep. Talk to your doctor if you are having problems sleeping.  Avoid sleeping pills unless they are prescribed by the doctor treating your depression.  Sleeping pills may make you groggy during the day, and they may interact with other medicine you are taking.  If you have any other illnesses, such as diabetes, heart disease, or high blood pressure, make sure to continue with your treatment. Tell your doctor about all of the medicines you take, including those with or without a prescription.  If you or someone you know talks about suicide, self-harm, or feeling hopeless, get help right away. Call the [facility] at 7-528-835-KHBH ([phone]) or text HOME to 027508 to access the Crisis Text Line. Consider saving these numbers in your phone. When should you call for help? Call 911 anytime you think you may need emergency care. For example, call if:     You feel like hurting yourself or someone else.      Someone you know has depression and is about to attempt or is attempting suicide. Call your doctor now or seek immediate medical care if:     You hear voices.      Someone you know has depression and:  ? Starts to give away his or her possessions. ? Uses illegal drugs or drinks alcohol heavily. ? Talks or writes about death, including writing suicide notes or talking about guns, knives, or pills. ? Starts to spend a lot of time alone. ? Acts very aggressively or suddenly appears calm. Watch closely for changes in your health, and be sure to contact your doctor if:     You do not get better as expected. Where can you learn more? Go to https://[employer] org and sign in to your [employer] account. Enter O545 in the De Correspondent box to learn more about \"Recovering From Depression: Care Instructions. \"     If you do not have an account, please click on the \"Sign Up Now\" link. Current as of: June 16, 2021               Content Version: 13.2  © 2839-9339 Healthwise, Incorporated. Care instructions adapted under license by [facility] ([facility]).  If you have questions about a medical condition or this instruction, always ask your healthcare professional. [place] [address] any warranty or liability for your use of this information.

## 2022-05-25 NOTE — PROGRESS NOTES
5/25/22    Simi Ram  1945    MARGARITO Inman is a 68 y.o. female who presents today for evaluation of:  Chief Complaint   Patient presents with    Medication Check     and refills. Wishes to discuss increasing Amitriptyline     Insomnia and depression . Amitriptyline helsp both and she would like to increase back to 100mg nightly. She feels she has to keep taking care of grandchildren and great crandchildren. Morbid  Obesity : she is trying to lose wt. Chronic pain : needs oxycodone refill  +adequate pain relief. No close call with falls. It helps her to physically be able to care w/ grt grandson who is autistic. Review of Systems   Psychiatric/Behavioral: Positive for dysphoric mood and sleep disturbance. Negative for self-injury and suicidal ideas. The patient is not nervous/anxious. Allergies   Allergen Reactions    Latex     Prednisone Hives      Fam : Grandson has depression     OBJECTIVE    /80 (Site: Left Upper Arm, Position: Sitting, Cuff Size: Medium Adult)   Pulse 77   Temp 97 °F (36.1 °C) (Infrared)   Resp 16   Ht 5' 1.75\" (1.568 m)   Wt 219 lb 6.4 oz (99.5 kg)   SpO2 97%   BMI 40.45 kg/m²     Physical Exam   Constitutional:       General: Not in acute distress. Uses a cane. Appearance: Normal appearance. Not ill-appearing. Eyes:      General: No scleral icterus. Cardiovascular:      Rate and Rhythm: Normal rate and regular rhythm. Heart sounds: No murmur heard. No friction rub. No gallop. Pulmonary:      Effort: Pulmonary effort is normal. No respiratory distress. Breath sounds: No wheezing, rhonchi or rales. Abdominal:      Palpations: Abdomen is soft. There is no mass. Tenderness: There is no abdominal tenderness. Musculoskeletal:     Moves all extremities normally. Skin:     General: Skin is warm. Coloration: Skin is not jaundiced. Neurological:      Mental Status: Patient is alert.    Psychiatric: Behavior: Behavior normal.         Thought Content: Thought content normal.         Judgment: Judgment normal.      ASSESSMENT/PLAN:    1. Primary osteoarthritis of both hips  Assessment & Plan:   I will continue to prescribe the oxycodone 7.5 mg tablets 3 times a day. She finds that it adequately controls her pain. PDMP was checked and did not show problematic use of controlled substances. Orders:  -     oxyCODONE-acetaminophen (PERCOCET) 7.5-325 MG per tablet; Take 1 tablet by mouth every 8 hours as needed for Pain for up to 30 days. , Disp-90 tablet, R-0Normal  2. CKD, Stage 3a chronic kidney disease ([facility] [address]  Assessment & Plan:   I will check a basic metabolic panel to monitor her kidney function. EGFR dropped from 54-48 between September 2021 and March 2022. Orders:  -     Basic Metabolic Panel  3. Moderate depressive disorder ([facility] [address]  Assessment & Plan:   I will increase the Elavil back to 100 mg daily to help with her depression. She felt it was beneficial.  Also continue sertraline. Orders:  -     amitriptyline (ELAVIL) 100 MG tablet; Take 1 tablet by mouth nightly, Disp-90 tablet, R-1Normal  4. Morbid obesity with BMI of 40.0-44.9, adult [facility])  Assessment & Plan:   Her weight is stable and I encouraged her to work on losing weight. It is safe to take acetaminophen up to a total dose of 3000 mg spread through the day. Be sure to include acetaminophen in all products since acetaminophen can be in cold preparations and in some opioid pain medications. You can take up to 2000mg (up to 4 x 500mg pills) in a day. PDMP shows last oxycodone  rx of #90 on 4/27/22. Return in about 4 weeks (around 6/22/2022) for OA pain.      Shirley Peoples MD

## 2022-05-26 LAB
ANION GAP SERPL CALCULATED.3IONS-SCNC: 18 MMOL/L (ref 3–16)
BUN BLDV-MCNC: 17 MG/DL (ref 7–20)
CALCIUM SERPL-MCNC: 9.1 MG/DL (ref 8.3–10.6)
CHLORIDE BLD-SCNC: 103 MMOL/L (ref 99–110)
CO2: 22 MMOL/L (ref 21–32)
CREAT SERPL-MCNC: 1 MG/DL (ref 0.6–1.2)
GFR AFRICAN AMERICAN: >60
GFR NON-AFRICAN AMERICAN: 54
GLUCOSE BLD-MCNC: 86 MG/DL (ref 70–99)
POTASSIUM SERPL-SCNC: 4.3 MMOL/L (ref 3.5–5.1)
SODIUM BLD-SCNC: 143 MMOL/L (ref 136–145)

## 2022-06-22 ENCOUNTER — OFFICE VISIT (OUTPATIENT)
Dept: FAMILY MEDICINE CLINIC | Age: 77
End: 2022-06-22
Payer: MEDICARE

## 2022-06-22 VITALS
WEIGHT: 222.4 LBS | HEART RATE: 73 BPM | OXYGEN SATURATION: 95 % | SYSTOLIC BLOOD PRESSURE: 115 MMHG | BODY MASS INDEX: 40.93 KG/M2 | DIASTOLIC BLOOD PRESSURE: 75 MMHG | HEIGHT: 62 IN

## 2022-06-22 DIAGNOSIS — M54.42 CHRONIC LEFT-SIDED LOW BACK PAIN WITH LEFT-SIDED SCIATICA: ICD-10-CM

## 2022-06-22 DIAGNOSIS — M16.0 PRIMARY OSTEOARTHRITIS OF BOTH HIPS: Primary | ICD-10-CM

## 2022-06-22 DIAGNOSIS — E66.01 MORBID OBESITY WITH BMI OF 40.0-44.9, ADULT (HCC): ICD-10-CM

## 2022-06-22 DIAGNOSIS — I10 ESSENTIAL HYPERTENSION: ICD-10-CM

## 2022-06-22 DIAGNOSIS — G89.29 CHRONIC LEFT-SIDED LOW BACK PAIN WITH LEFT-SIDED SCIATICA: ICD-10-CM

## 2022-06-22 DIAGNOSIS — N18.31 STAGE 3A CHRONIC KIDNEY DISEASE (HCC): ICD-10-CM

## 2022-06-22 DIAGNOSIS — G25.81 RLS (RESTLESS LEGS SYNDROME): ICD-10-CM

## 2022-06-22 PROBLEM — N18.30 CHRONIC RENAL DISEASE, STAGE III (HCC): Status: ACTIVE | Noted: 2022-06-22

## 2022-06-22 PROBLEM — Z79.891 LONG TERM (CURRENT) USE OF OPIATE ANALGESIC: Status: ACTIVE | Noted: 2022-04-27

## 2022-06-22 PROBLEM — N18.30 CHRONIC RENAL DISEASE, STAGE III (HCC): Status: RESOLVED | Noted: 2022-06-22 | Resolved: 2022-06-22

## 2022-06-22 PROCEDURE — 99214 OFFICE O/P EST MOD 30 MIN: CPT | Performed by: FAMILY MEDICINE

## 2022-06-22 PROCEDURE — G8417 CALC BMI ABV UP PARAM F/U: HCPCS | Performed by: FAMILY MEDICINE

## 2022-06-22 PROCEDURE — G8400 PT W/DXA NO RESULTS DOC: HCPCS | Performed by: FAMILY MEDICINE

## 2022-06-22 PROCEDURE — 1036F TOBACCO NON-USER: CPT | Performed by: FAMILY MEDICINE

## 2022-06-22 PROCEDURE — 1090F PRES/ABSN URINE INCON ASSESS: CPT | Performed by: FAMILY MEDICINE

## 2022-06-22 PROCEDURE — G8427 DOCREV CUR MEDS BY ELIG CLIN: HCPCS | Performed by: FAMILY MEDICINE

## 2022-06-22 PROCEDURE — 1123F ACP DISCUSS/DSCN MKR DOCD: CPT | Performed by: FAMILY MEDICINE

## 2022-06-22 RX ORDER — ROPINIROLE 0.5 MG/1
0.5 TABLET, FILM COATED ORAL 3 TIMES DAILY
Qty: 270 TABLET | Refills: 2 | Status: SHIPPED | OUTPATIENT
Start: 2022-06-22 | End: 2022-10-11 | Stop reason: SDUPTHER

## 2022-06-22 RX ORDER — OXYCODONE AND ACETAMINOPHEN 7.5; 325 MG/1; MG/1
1 TABLET ORAL EVERY 6 HOURS PRN
Qty: 90 TABLET | Refills: 0 | Status: SHIPPED | OUTPATIENT
Start: 2022-06-22 | End: 2022-07-20 | Stop reason: SDUPTHER

## 2022-06-22 ASSESSMENT — ENCOUNTER SYMPTOMS
BACK PAIN: 1
COUGH: 0
SHORTNESS OF BREATH: 0
WHEEZING: 0

## 2022-06-22 NOTE — PATIENT INSTRUCTIONS
Patient Education        Learning About Relief for Back Pain  What is back strain? Back strain is an injury that happens when you overstretch, or pull, a muscle in your back. You may hurt your back in an accident or when you exercise or lift something. Most back pain gets better with rest and time. You can takecare of yourself at home to help your back heal.  What can you do first to relieve back pain? When you first feel back pain, try these steps:   Walk. Take a short walk (10 to 20 minutes) on a level surface (no slopes, hills, or stairs) every 2 to 3 hours. Walk only distances you can manage without pain, especially leg pain.  Relax. Find a comfortable position for rest. Some people are comfortable on the floor or a medium-firm bed with a small pillow under their head and another under their knees. Some people prefer to lie on their side with a pillow between their knees. Don't stay in one position for too long.  Try heat or ice. Try using a heating pad on a low or medium setting, or take a warm shower, for 15 to 20 minutes every 2 to 3 hours. Or you can buy single-use heat wraps that last up to 8 hours. You can also try an ice pack for 10 to 15 minutes every 2 to 3 hours. You can use an ice pack or a bag of frozen vegetables wrapped in a thin towel. There is not strong evidence that either heat or ice will help, but you can try them to see if they help. You may also want to try switching between heat and cold.  Take pain medicine exactly as directed. ? If the doctor gave you a prescription medicine for pain, take it as prescribed. ? If you are not taking a prescription pain medicine, ask your doctor if you can take an over-the-counter medicine. What else can you do?  Stretch and exercise. Exercises that increase flexibility may relieve your pain and make it easier for your muscles to keep your spine in a good, neutral position. And don't forget to keep walking.  Do self-massage.  You can use self-massage to unwind after work or school or to energize yourself in the morning. You can easily massage your feet, hands, or neck. Self-massage works best if you are in comfortable clothes and are sitting or lying in a comfortable position. Use oil or lotion to massage bare skin.  Reduce stress. Back pain can lead to a vicious [place]: Distress about the pain tenses the muscles in your back, which in turn causes more pain. Learn how to relax your mind and your muscles to lower your stress. Where can you learn more? Go to https://GlassUppe[employer]iceweb.[employer]. org and sign in to your [employer] account. Enter E377 in the [employer] box to learn more about \"Learning About Relief for Back Pain. \"     If you do not have an account, please click on the \"Sign Up Now\" link. Current as of: March 9, 2022               Content Version: 13.3  © 4329-0441 Healthwise, Incorporated. Care instructions adapted under license by [facility] ([facility]). If you have questions about a medical condition or this instruction, always ask your healthcare professional. [place] [address] any warranty or liability for your use of this information.

## 2022-06-22 NOTE — PROGRESS NOTES
6/22/22    Yasmani Show  1945    SUBJECTIVE    HPI - Stephania Hutton is a 68 y.o. female who presents today for evaluation of:  Chief Complaint   Patient presents with    Follow-up     OA pain     Arthritis : She is having increased arthritis pain today after cleaning yesterday. Sometimes she needs to take a 4th pill of Norco 7.5mg. from waist down legs. CKD 3a : no urinary problems     BMI : she is trying to keep wt down    RLS : requip helps and she wants to keep taking it. Review of Systems   Respiratory: Negative for cough, shortness of breath and wheezing. Cardiovascular: Negative for chest pain, palpitations and leg swelling. Musculoskeletal: Positive for arthralgias and back pain. Allergies   Allergen Reactions    Latex     Prednisone Hives        OBJECTIVE    /75   Pulse 73   Ht 5' 1.75\" (1.568 m)   Wt 222 lb 6.4 oz (100.9 kg)   SpO2 95%   BMI 41.01 kg/m²     Physical Exam   Constitutional:       General: Not in acute distress. Appearance: Normal appearance. Not ill-appearing. Eyes:      General: No scleral icterus. Cardiovascular:      Rate and Rhythm: Normal rate and regular rhythm. Heart sounds: No murmur heard. No friction rub. No gallop. Pulmonary:      Effort: Pulmonary effort is normal. No respiratory distress. Breath sounds: No wheezing, rhonchi or rales. Abdominal:      Palpations: Abdomen is soft. There is no mass. Tenderness: There is no abdominal tenderness. Musculoskeletal:     Moves all extremities normally. Strong marco a adf and apf, neg marc oa slr. Skin:     General: Skin is warm. Coloration: Skin is not jaundiced. Neurological:      Mental Status: Patient is alert. 2+ marco a kjrs and NL lt sense both legs. Psychiatric:         Behavior: Behavior normal.         Thought Content:  Thought content normal.         Judgment: Judgment normal.    Reviewed:   PDMP shows rx of #90 on5/25/2022    ASSESSMENT/PLAN:    1. OA, Hips, Primary osteoarthritis of both hips  Assessment & Plan:   See note under chronic left-sided back pain with left sciatica. Orders:  -     oxyCODONE-acetaminophen (PERCOCET) 7.5-325 MG per tablet; Take 1 tablet by mouth every 6 hours as needed for Pain for up to 27 days. Make sure #90 pills lasts at least 27 to 28 days. , Disp-90 tablet, R-0Normal  2. Essential hypertension  Assessment & Plan:   Her blood pressure is adequately controlled. With her CKD it is important to keep the blood pressure down. 3. CKD, Stage 3a chronic kidney disease ([facility] [address]  Assessment & Plan:   Striving to keep blood pressure adequately controlled. 4. Morbid obesity with BMI of 40.0-44.9, adult [facility])  Assessment & Plan:   Encouraged weight loss. 5. RLS (restless legs syndrome)  Assessment & Plan:   She had good results with Requip and so I will put out refills. Orders:  -     rOPINIRole (REQUIP) 0.5 MG tablet; Take 1 tablet by mouth 3 times daily, Disp-270 tablet, R-2Normal  6. Back Pain, Chronic left-sided low back pain with left-sided sciatica  Assessment & Plan:   PDMP was checked and shows that the last prescription for #90 oxycodone was dispensed 27 days ago. I will modify the prescription to say that she can take occasional extra oxycodone- acetaminophen as long as 90 pills lasts 27 to 28 days. Orders:  -     rOPINIRole (REQUIP) 0.5 MG tablet; Take 1 tablet by mouth 3 times daily, Disp-270 tablet, R-2Normal    Opioid Safety Documentation:   ^ PDMP was reviewed.  ^ He/She knows to keep the oxycodone in a secure place. ^ The pain relief is adequate.  ^ Other things have been tried but she/he does not feel that they help adequately.   ^ Patient is aware of the sedative effects with controlled substances.   ^ I advised not mixing the controlled substances with EtOH or other CNS depressants.  ^ Controlled substance contract on file      Tere Norman MD

## 2022-06-22 NOTE — ASSESSMENT & PLAN NOTE
PDMP was checked and shows that the last prescription for #90 oxycodone was dispensed 27 days ago. I will modify the prescription to say that she can take occasional extra oxycodone- acetaminophen as long as 90 pills lasts 27 to 28 days.

## 2022-07-12 ENCOUNTER — TELEPHONE (OUTPATIENT)
Dept: FAMILY MEDICINE CLINIC | Age: 77
End: 2022-07-12

## 2022-07-12 NOTE — TELEPHONE ENCOUNTER
Needs a Current Medication List to be faxed over the their manager.     Fax# [facility]    Attn:  Adithya Fulton

## 2022-07-20 ENCOUNTER — OFFICE VISIT (OUTPATIENT)
Dept: FAMILY MEDICINE CLINIC | Age: 77
End: 2022-07-20
Payer: MEDICARE

## 2022-07-20 VITALS
WEIGHT: 221.4 LBS | OXYGEN SATURATION: 93 % | BODY MASS INDEX: 40.74 KG/M2 | TEMPERATURE: 97 F | HEART RATE: 72 BPM | HEIGHT: 62 IN | SYSTOLIC BLOOD PRESSURE: 126 MMHG | DIASTOLIC BLOOD PRESSURE: 60 MMHG

## 2022-07-20 DIAGNOSIS — M48.062 SPINAL STENOSIS OF LUMBAR REGION WITH NEUROGENIC CLAUDICATION: Primary | ICD-10-CM

## 2022-07-20 DIAGNOSIS — N39.41 URGE INCONTINENCE: ICD-10-CM

## 2022-07-20 DIAGNOSIS — F51.04 PSYCHOPHYSIOLOGICAL INSOMNIA: ICD-10-CM

## 2022-07-20 DIAGNOSIS — E66.01 CLASS 3 OBESITY (HCC): ICD-10-CM

## 2022-07-20 DIAGNOSIS — Z86.79 HISTORY OF HYPERTENSION: ICD-10-CM

## 2022-07-20 DIAGNOSIS — R15.9 FULL INCONTINENCE OF FECES: ICD-10-CM

## 2022-07-20 DIAGNOSIS — K90.49 DAIRY PRODUCT INTOLERANCE: ICD-10-CM

## 2022-07-20 DIAGNOSIS — R73.03 PREDIABETES: ICD-10-CM

## 2022-07-20 DIAGNOSIS — Z79.891 LONG TERM (CURRENT) USE OF OPIATE ANALGESIC: ICD-10-CM

## 2022-07-20 PROBLEM — E66.813 CLASS 3 OBESITY: Status: ACTIVE | Noted: 2017-05-31

## 2022-07-20 PROBLEM — G89.29 CHRONIC LEFT-SIDED LOW BACK PAIN WITH LEFT-SIDED SCIATICA: Status: RESOLVED | Noted: 2017-05-31 | Resolved: 2022-07-20

## 2022-07-20 PROBLEM — M54.42 CHRONIC LEFT-SIDED LOW BACK PAIN WITH LEFT-SIDED SCIATICA: Status: RESOLVED | Noted: 2017-05-31 | Resolved: 2022-07-20

## 2022-07-20 PROCEDURE — G8400 PT W/DXA NO RESULTS DOC: HCPCS | Performed by: FAMILY MEDICINE

## 2022-07-20 PROCEDURE — G8427 DOCREV CUR MEDS BY ELIG CLIN: HCPCS | Performed by: FAMILY MEDICINE

## 2022-07-20 PROCEDURE — 99214 OFFICE O/P EST MOD 30 MIN: CPT | Performed by: FAMILY MEDICINE

## 2022-07-20 PROCEDURE — 1036F TOBACCO NON-USER: CPT | Performed by: FAMILY MEDICINE

## 2022-07-20 PROCEDURE — 0509F URINE INCON PLAN DOCD: CPT | Performed by: FAMILY MEDICINE

## 2022-07-20 PROCEDURE — G8417 CALC BMI ABV UP PARAM F/U: HCPCS | Performed by: FAMILY MEDICINE

## 2022-07-20 PROCEDURE — 1090F PRES/ABSN URINE INCON ASSESS: CPT | Performed by: FAMILY MEDICINE

## 2022-07-20 PROCEDURE — 1123F ACP DISCUSS/DSCN MKR DOCD: CPT | Performed by: FAMILY MEDICINE

## 2022-07-20 RX ORDER — MIRABEGRON 25 MG/1
TABLET, FILM COATED, EXTENDED RELEASE ORAL
Qty: 90 TABLET | Refills: 3 | Status: SHIPPED | OUTPATIENT
Start: 2022-07-20 | End: 2022-10-11 | Stop reason: SDUPTHER

## 2022-07-20 RX ORDER — LOPERAMIDE HYDROCHLORIDE 2 MG/1
2 CAPSULE ORAL 2 TIMES DAILY PRN
Qty: 180 CAPSULE | Refills: 1 | Status: SHIPPED | OUTPATIENT
Start: 2022-07-20 | End: 2022-08-09 | Stop reason: SDUPTHER

## 2022-07-20 RX ORDER — TRAZODONE HYDROCHLORIDE 100 MG/1
100 TABLET ORAL NIGHTLY
Qty: 90 TABLET | Refills: 0 | Status: SHIPPED | OUTPATIENT
Start: 2022-07-20 | End: 2022-08-09 | Stop reason: SDUPTHER

## 2022-07-20 RX ORDER — MIRABEGRON 25 MG/1
TABLET, FILM COATED, EXTENDED RELEASE ORAL
COMMUNITY
Start: 2022-07-07 | End: 2022-07-20 | Stop reason: SDUPTHER

## 2022-07-20 RX ORDER — OXYCODONE AND ACETAMINOPHEN 7.5; 325 MG/1; MG/1
1 TABLET ORAL EVERY 6 HOURS PRN
Qty: 100 TABLET | Refills: 0 | Status: SHIPPED | OUTPATIENT
Start: 2022-07-20 | End: 2022-08-09 | Stop reason: SDUPTHER

## 2022-07-20 ASSESSMENT — ENCOUNTER SYMPTOMS
ABDOMINAL PAIN: 0
SHORTNESS OF BREATH: 0
DIARRHEA: 0
COUGH: 0

## 2022-07-20 NOTE — ASSESSMENT & PLAN NOTE
I will continue oxycodone and change the prescription to 100 pills which I expect will last 30 days. I made a note that she can  today's prescription today since she finds gasoline expensive and wants to minimize the trips out and about. Her pain control is adequate. I did not note any evidence of irresponsible opioid use.

## 2022-07-20 NOTE — PROGRESS NOTES
7/20/22    [facility]  1945    SUBJECTIVE    HPI - Mason Robin is a 68 y.o. female who presents today for evaluation of:  Chief Complaint   Patient presents with    1 Month Follow-Up     Questions about medication     Spinal Stenosis : She has a long history of spinal stenosis. In the past she has been treated elsewhere with oxycodone 7.5 mg 4 times a day but she has been able to reduce it to 3 times a day on most days. She does take some extra Tylenol between doses of oxycodone on some days. Stool incont : In the past she was given a pill that helped. She leaks moderate amount wet stool. Urine incontinence : Myrbetriq helps. Insomnia : She finds that amitriptyline is not helping her sleep and she would like to change it to something different. Dairy allergy : sneezes whenever she consumes dairly. Review of Systems   Respiratory:  Negative for cough and shortness of breath. Cardiovascular:  Negative for chest pain and palpitations. Gastrointestinal:  Negative for abdominal pain and diarrhea. Psychiatric/Behavioral:  Positive for sleep disturbance. Allergies   Allergen Reactions    Latex     Prednisone Hives      Past medical history: In the past she was given lisinopril for blood pressure. OBJECTIVE    /60 (Site: Right Upper Arm)   Pulse 72   Temp 97 °F (36.1 °C) (Infrared)   Ht 5' 1.75\" (1.568 m)   Wt 221 lb 6.4 oz (100.4 kg)   SpO2 93%   BMI 40.82 kg/m²     Physical Exam   Constitutional:       General: Not in acute distress. Appearance: Normal appearance. Not ill-appearing. Eyes:      General: No scleral icterus. Cardiovascular:      Rate and Rhythm: Normal rate and regular rhythm. Heart sounds: No murmur heard. No friction rub. No gallop. Pulmonary:      Effort: Pulmonary effort is normal. No respiratory distress. Breath sounds: No wheezing, rhonchi or rales. Abdominal:      Palpations: Abdomen is soft. There is no mass. Tenderness:  There is no abdominal tenderness. Musculoskeletal:     Moves all extremities normally. Skin:     General: Skin is warm. Coloration: Skin is not jaundiced. Neurological:      Mental Status: Patient is alert. Psychiatric:         Behavior: Behavior normal.         Thought Content: Thought content normal.         Judgment: Judgment normal.    Reviewed:  Pdmp shows  #90 Oxycodone  7.5 MG dispensed 6/22/22  11/21/15 MRI lumbar back confirms severe multifactorial spinal canal stenosis at L3-L4 & at L2-L3.   7/17/19 A1c 6.2%. ASSESSMENT/PLAN:    1. Spinal stenosis of lumbar region with neurogenic claudication  Assessment & Plan:   I will continue oxycodone and change the prescription to 100 pills which I expect will last 30 days. I made a note that she can  today's prescription today since she finds gasoline expensive and wants to minimize the trips out and about. Her pain control is adequate. I did not note any evidence of irresponsible opioid use. Orders:  -     oxyCODONE-acetaminophen (PERCOCET) 7.5-325 MG per tablet; Take 1 tablet by mouth every 6 hours as needed for Pain (but be sure #100 pills lasts 30 days.) for up to 30 days. , Disp-100 tablet, R-0Normal  2. Class 3 obesity  Assessment & Plan:   Her weight is down a pound since last month. 3. Long term (current) use of opiate analgesic  Assessment & Plan:   See spinal stenosis note. 4. History of hypertension  Assessment & Plan:   Blood pressure is adequately low. 5. Prediabetes, formerly DM2 prior ot gastric bypass. Assessment & Plan:   I am changing the diagnosis in her problem list from diabetes to prediabetes. She states that she has essentially been cured of diabetes with bariatric surgery. I did not see any A1c is greater than 6.2 when I reviewed her old labs. 6. Urge incontinence  Assessment & Plan:   I put out refills of Myrbetriq to help with her urine urge incontinence.   Orders:  -     MYRBETRIQ 25 MG TB24; TAKE 1 TABLET BY MOUTH DAILY, Disp-90 tablet, R-3, DAWNormal  7. incontinence of feces, Full  Assessment & Plan:   I will suggest loperamide up to twice a day to help with the stool incontinence. Orders:  -     loperamide (RA ANTI-DIARRHEAL) 2 MG capsule; Take 1 capsule by mouth 2 times daily as needed (stool leakage), Disp-180 capsule, R-1Normal  8. Psychophysiological insomnia  Assessment & Plan:   I will try to make a change from amitriptyline to trazodone. Orders:  -     traZODone (DESYREL) 100 MG tablet; Take 1 tablet by mouth nightly, Disp-90 tablet, R-0Normal  9. Dairy product intolerance  Assessment & Plan:   I suggested trying Benadryl. It does not sound like a serious allergy and she states that she is not willing to give up dairy. It is safe to take acetaminophen up to a total dose of 3000 mg spread through the day. Be sure to include acetaminophen in all products since acetaminophen can be in cold preparations and in some opioid pain medications. Counseling provided for:  Healthy eating - Avoid sugar and other refined carbohydrates. Return in about 29 days (around 8/18/2022) for back pain .    Brooke Roe MD

## 2022-07-20 NOTE — ASSESSMENT & PLAN NOTE
I suggested trying Benadryl. It does not sound like a serious allergy and she states that she is not willing to give up dairy.

## 2022-07-20 NOTE — ASSESSMENT & PLAN NOTE
I am changing the diagnosis in her problem list from diabetes to prediabetes. She states that she has essentially been cured of diabetes with bariatric surgery. I did not see any A1c is greater than 6.2 when I reviewed her old labs.

## 2022-08-08 ENCOUNTER — TELEPHONE (OUTPATIENT)
Dept: FAMILY MEDICINE CLINIC | Age: 77
End: 2022-08-08

## 2022-08-08 NOTE — TELEPHONE ENCOUNTER
Pt fell this weekend and went to [facility]. Pt fell on her right side. Pt was given Methocarbamol 500 MG and states not working. Pt is requesting a muscle relaxer from doctor. I informed her that's what Methocarbamol is.

## 2022-08-09 ENCOUNTER — OFFICE VISIT (OUTPATIENT)
Dept: FAMILY MEDICINE CLINIC | Age: 77
End: 2022-08-09
Payer: MEDICARE

## 2022-08-09 VITALS
HEART RATE: 65 BPM | WEIGHT: 219.4 LBS | BODY MASS INDEX: 40.37 KG/M2 | TEMPERATURE: 97.2 F | HEIGHT: 62 IN | OXYGEN SATURATION: 96 % | SYSTOLIC BLOOD PRESSURE: 130 MMHG | DIASTOLIC BLOOD PRESSURE: 80 MMHG

## 2022-08-09 DIAGNOSIS — M48.062 SPINAL STENOSIS OF LUMBAR REGION WITH NEUROGENIC CLAUDICATION: Primary | ICD-10-CM

## 2022-08-09 DIAGNOSIS — J45.30 MILD PERSISTENT ASTHMA, UNSPECIFIED WHETHER COMPLICATED: ICD-10-CM

## 2022-08-09 DIAGNOSIS — R53.83 FATIGUE, UNSPECIFIED TYPE: ICD-10-CM

## 2022-08-09 DIAGNOSIS — F51.04 PSYCHOPHYSIOLOGICAL INSOMNIA: ICD-10-CM

## 2022-08-09 DIAGNOSIS — R25.2 MUSCLE CRAMPS: ICD-10-CM

## 2022-08-09 DIAGNOSIS — K21.9 GASTROESOPHAGEAL REFLUX DISEASE WITHOUT ESOPHAGITIS: ICD-10-CM

## 2022-08-09 DIAGNOSIS — N18.31 STAGE 3A CHRONIC KIDNEY DISEASE (HCC): ICD-10-CM

## 2022-08-09 DIAGNOSIS — R15.9 FULL INCONTINENCE OF FECES: ICD-10-CM

## 2022-08-09 DIAGNOSIS — F41.1 GAD (GENERALIZED ANXIETY DISORDER): ICD-10-CM

## 2022-08-09 LAB
BASOPHILS ABSOLUTE: 0 K/UL (ref 0–0.2)
BASOPHILS RELATIVE PERCENT: 0.5 %
EOSINOPHILS ABSOLUTE: 0.1 K/UL (ref 0–0.6)
EOSINOPHILS RELATIVE PERCENT: 1.7 %
HCT VFR BLD CALC: 34.8 % (ref 36–48)
HEMOGLOBIN: 11.2 G/DL (ref 12–16)
LYMPHOCYTES ABSOLUTE: 2.3 K/UL (ref 1–5.1)
LYMPHOCYTES RELATIVE PERCENT: 36.2 %
MCH RBC QN AUTO: 27.2 PG (ref 26–34)
MCHC RBC AUTO-ENTMCNC: 32.3 G/DL (ref 31–36)
MCV RBC AUTO: 84.1 FL (ref 80–100)
MONOCYTES ABSOLUTE: 0.4 K/UL (ref 0–1.3)
MONOCYTES RELATIVE PERCENT: 6.8 %
NEUTROPHILS ABSOLUTE: 3.5 K/UL (ref 1.7–7.7)
NEUTROPHILS RELATIVE PERCENT: 54.8 %
PDW BLD-RTO: 15.2 % (ref 12.4–15.4)
PLATELET # BLD: 332 K/UL (ref 135–450)
PMV BLD AUTO: 8.1 FL (ref 5–10.5)
RBC # BLD: 4.14 M/UL (ref 4–5.2)
WBC # BLD: 6.3 K/UL (ref 4–11)

## 2022-08-09 PROCEDURE — 1123F ACP DISCUSS/DSCN MKR DOCD: CPT | Performed by: FAMILY MEDICINE

## 2022-08-09 PROCEDURE — G8400 PT W/DXA NO RESULTS DOC: HCPCS | Performed by: FAMILY MEDICINE

## 2022-08-09 PROCEDURE — G8427 DOCREV CUR MEDS BY ELIG CLIN: HCPCS | Performed by: FAMILY MEDICINE

## 2022-08-09 PROCEDURE — 99214 OFFICE O/P EST MOD 30 MIN: CPT | Performed by: FAMILY MEDICINE

## 2022-08-09 PROCEDURE — 1090F PRES/ABSN URINE INCON ASSESS: CPT | Performed by: FAMILY MEDICINE

## 2022-08-09 PROCEDURE — G8417 CALC BMI ABV UP PARAM F/U: HCPCS | Performed by: FAMILY MEDICINE

## 2022-08-09 PROCEDURE — 1036F TOBACCO NON-USER: CPT | Performed by: FAMILY MEDICINE

## 2022-08-09 PROCEDURE — 36415 COLL VENOUS BLD VENIPUNCTURE: CPT | Performed by: FAMILY MEDICINE

## 2022-08-09 RX ORDER — OMEPRAZOLE 20 MG/1
20 CAPSULE, DELAYED RELEASE ORAL 2 TIMES DAILY PRN
Qty: 180 CAPSULE | Refills: 1 | Status: SHIPPED | OUTPATIENT
Start: 2022-08-09 | End: 2022-09-06 | Stop reason: SDUPTHER

## 2022-08-09 RX ORDER — FAMOTIDINE 40 MG/1
40 TABLET, FILM COATED ORAL NIGHTLY
Qty: 90 TABLET | Refills: 1 | Status: SHIPPED | OUTPATIENT
Start: 2022-08-09 | End: 2022-08-22

## 2022-08-09 RX ORDER — TRAZODONE HYDROCHLORIDE 100 MG/1
100 TABLET ORAL NIGHTLY
Qty: 90 TABLET | Refills: 0 | Status: SHIPPED | OUTPATIENT
Start: 2022-08-09 | End: 2022-09-06 | Stop reason: SDUPTHER

## 2022-08-09 RX ORDER — SERTRALINE HYDROCHLORIDE 100 MG/1
100 TABLET, FILM COATED ORAL NIGHTLY
Qty: 90 TABLET | Refills: 1 | Status: SHIPPED | OUTPATIENT
Start: 2022-08-09 | End: 2022-09-06 | Stop reason: SDUPTHER

## 2022-08-09 RX ORDER — FLUTICASONE FUROATE AND VILANTEROL 100; 25 UG/1; UG/1
1 POWDER RESPIRATORY (INHALATION) DAILY
Qty: 3 EACH | Refills: 3 | Status: SHIPPED | OUTPATIENT
Start: 2022-08-09 | End: 2022-10-11 | Stop reason: SDUPTHER

## 2022-08-09 RX ORDER — OXYCODONE AND ACETAMINOPHEN 7.5; 325 MG/1; MG/1
1 TABLET ORAL EVERY 6 HOURS PRN
Qty: 100 TABLET | Refills: 0 | Status: SHIPPED | OUTPATIENT
Start: 2022-08-19 | End: 2022-09-06 | Stop reason: SDUPTHER

## 2022-08-09 RX ORDER — LOPERAMIDE HYDROCHLORIDE 2 MG/1
2 CAPSULE ORAL 2 TIMES DAILY PRN
Qty: 180 CAPSULE | Refills: 1 | Status: SHIPPED | OUTPATIENT
Start: 2022-08-09

## 2022-08-09 ASSESSMENT — ENCOUNTER SYMPTOMS
SHORTNESS OF BREATH: 0
WHEEZING: 0

## 2022-08-09 NOTE — ASSESSMENT & PLAN NOTE
She has chronic back pain that is adequately controlled with oxycodone 7.5 mg as a day on most days and occasionally 4 times a day. I put out a prescription to be filled about 30 days after her past prescription of number 100 tablets. I recommended that she do marching in place exercises where she practices lifting her knees high in order to strengthen her legs to try to prevent trips or falls.

## 2022-08-09 NOTE — PROGRESS NOTES
8/9/22    Ham Rios  1945    SUBJECTIVE    HPI - Karina Reyes is a 68 y.o. female who presents today for evaluation of:  Chief Complaint   Patient presents with    Follow-up     ED follow up from a fall. She had a fall hitting RT arm and knee and hip. Having pains left shoulder and cramps left leg down the leg. Muscle cramps are just left leg. Tripped on tip of left toes. Urge incontinence : she is taking Myrbetriq despite the fact that chart says she is not taking it. Spinal stenosis : oxycodone controls the pain. Depression : sertraline helps. No SI. Stomach : She feels she needs omeprazole tid. Sh eis willing to try omepraxole bid with pepcid once a day. Review of Systems   Constitutional:  Positive for fatigue. Negative for fever. Respiratory:  Negative for shortness of breath and wheezing. Cardiovascular:  Negative for chest pain and palpitations. Allergies   Allergen Reactions    Latex     Prednisone Hives      Social history: She has moved and some of her medications are misplaced. OBJECTIVE    /80 (Site: Left Upper Arm, Position: Sitting, Cuff Size: Large Adult)   Pulse 65   Temp 97.2 °F (36.2 °C) (Infrared)   Ht 5' 1.75\" (1.568 m)   Wt 219 lb 6.4 oz (99.5 kg)   SpO2 96%   BMI 40.45 kg/m²     Physical Exam   Constitutional:       General: Not in acute distress. Appearance: Normal appearance. Not ill-appearing. Eyes:      General: No scleral icterus. Cardiovascular:      Rate and Rhythm: Normal rate and regular rhythm. Heart sounds: No murmur heard. No friction rub. No gallop. Pulmonary:      Effort: Pulmonary effort is normal. No respiratory distress. Breath sounds: No wheezing, rhonchi or rales. Abdominal:      Palpations: Abdomen is soft. There is no mass. Tenderness: There is no abdominal tenderness. Musculoskeletal:     Moves all extremities normally. Skin:     General: Skin is warm.       Coloration: Skin is not jaundiced. Neurological:      Mental Status: Patient is alert. Psychiatric:         Behavior: Behavior normal.         Thought Content: Thought content normal.         Judgment: Judgment normal.    Reviewed: PDMP shows last oxycodone 7/20/2022. ASSESSMENT/PLAN:    1. Spinal stenosis of lumbar region with neurogenic claudication  Assessment & Plan:   She has chronic back pain that is adequately controlled with oxycodone 7.5 mg as a day on most days and occasionally 4 times a day. I put out a prescription to be filled about 30 days after her past prescription of number 100 tablets. I recommended that she do marching in place exercises where she practices lifting her knees high in order to strengthen her legs to try to prevent trips or falls. Orders:  -     oxyCODONE-acetaminophen (PERCOCET) 7.5-325 MG per tablet; Take 1 tablet by mouth every 6 hours as needed for Pain (but be sure #100 pills lasts 30 days.) for up to 30 days. , Disp-100 tablet, R-0Normal  2. Psychophysiological insomnia  Assessment & Plan:   I put out a refill of trazodone. Orders:  -     traZODone (DESYREL) 100 MG tablet; Take 1 tablet by mouth nightly, Disp-90 tablet, R-0Normal  3. incontinence of feces, Full  Assessment & Plan:   I will put out a refill of the loperamide that she states she is out of. Orders:  -     loperamide (RA ANTI-DIARRHEAL) 2 MG capsule; Take 1 capsule by mouth 2 times daily as needed (stool leakage), Disp-180 capsule, R-1Normal  4. CHARLIE (generalized anxiety disorder)  Assessment & Plan:   I will refill Zoloft that is helpful for her chronic anxiety. Orders:  -     sertraline (ZOLOFT) 100 MG tablet; Take 1 tablet by mouth nightly, Disp-90 tablet, R-1Normal  5. Mild persistent asthma, unspecified whether complicated  Assessment & Plan:   Putting out refills of Breo to help control her asthma. Orders:  -     fluticasone-vilanterol (BREO ELLIPTA) 100-25 MCG/INH AEPB inhaler;  Inhale 1 puff into the lungs in the morning., Disp-3 each, R-3Normal  6. Gastroesophageal reflux disease without esophagitis  Assessment & Plan:   Instead of increasing omeprazole to 3 times a day as she would like I suggested adding famotidine in the evening and taking the omeprazole just twice a day. I reviewed the fact that omeprazole can cause increased risk of pneumonia, C. difficile, kidney decline, and other problems. Orders:  -     omeprazole (PRILOSEC) 20 MG delayed release capsule; Take 1 capsule by mouth 2 times daily as needed (stomach symptoms), Disp-180 capsule, R-1Normal  -     famotidine (PEPCID) 40 MG tablet; Take 1 tablet by mouth at bedtime, Disp-90 tablet, R-1Normal  7. Fatigue, unspecified type  -     CBC with Auto Differential  8. CKD, Stage 3a chronic kidney disease ([facility] [address]  Assessment & Plan:   I will check renal function panel and magnesium since she has CKD. A magnesium imbalance could be contributing to some of her muscle spasm symptoms. Orders:  -     Renal Function Panel  -     Magnesium  9. Muscle cramps  Assessment & Plan:   I will check a magnesium level. I will also do a trial prescription of oral magnesium. I suggested small amounts of pickle juice and she stated that she already drinks pickle juice because she loves it. Orders:  -     Magnesium 100 MG TABS; Take 1 tablet by mouth daily, Disp-30 tablet, R-0Normal      Encouraged marching in place lifting knees high. Return in about 5 weeks (around 9/13/2022) for chronic pain.    Mony Zepeda MD

## 2022-08-09 NOTE — ASSESSMENT & PLAN NOTE
I will check a magnesium level. I will also do a trial prescription of oral magnesium. I suggested small amounts of pickle juice and she stated that she already drinks pickle juice because she loves it.

## 2022-08-09 NOTE — ASSESSMENT & PLAN NOTE
Instead of increasing omeprazole to 3 times a day as she would like I suggested adding famotidine in the evening and taking the omeprazole just twice a day. I reviewed the fact that omeprazole can cause increased risk of pneumonia, C. difficile, kidney decline, and other problems.

## 2022-08-10 LAB
ALBUMIN SERPL-MCNC: 4.3 G/DL (ref 3.4–5)
ANION GAP SERPL CALCULATED.3IONS-SCNC: 13 MMOL/L (ref 3–16)
BUN BLDV-MCNC: 22 MG/DL (ref 7–20)
CALCIUM SERPL-MCNC: 8.9 MG/DL (ref 8.3–10.6)
CHLORIDE BLD-SCNC: 108 MMOL/L (ref 99–110)
CO2: 23 MMOL/L (ref 21–32)
CREAT SERPL-MCNC: 0.9 MG/DL (ref 0.6–1.2)
GFR AFRICAN AMERICAN: >60
GFR NON-AFRICAN AMERICAN: >60
GLUCOSE BLD-MCNC: 91 MG/DL (ref 70–99)
MAGNESIUM: 1.7 MG/DL (ref 1.8–2.4)
PHOSPHORUS: 3.8 MG/DL (ref 2.5–4.9)
POTASSIUM SERPL-SCNC: 4.3 MMOL/L (ref 3.5–5.1)
SODIUM BLD-SCNC: 144 MMOL/L (ref 136–145)

## 2022-08-11 ENCOUNTER — TELEPHONE (OUTPATIENT)
Dept: FAMILY MEDICINE CLINIC | Age: 77
End: 2022-08-11

## 2022-08-11 ENCOUNTER — HOSPITAL ENCOUNTER (EMERGENCY)
Age: 77
Discharge: HOME | End: 2022-08-12
Attending: EMERGENCY MEDICINE
Payer: MEDICARE

## 2022-08-11 DIAGNOSIS — M62.838 SPASM OF MUSCLE: ICD-10-CM

## 2022-08-11 DIAGNOSIS — S70.01XA CONTUSION OF RIGHT HIP, INITIAL ENCOUNTER: Primary | ICD-10-CM

## 2022-08-11 PROCEDURE — 99284 EMERGENCY DEPT VISIT MOD MDM: CPT

## 2022-08-11 ASSESSMENT — PAIN DESCRIPTION - DESCRIPTORS: DESCRIPTORS: SHARP

## 2022-08-11 ASSESSMENT — PAIN SCALES - GENERAL: PAINLEVEL_OUTOF10: 10

## 2022-08-11 ASSESSMENT — PAIN DESCRIPTION - FREQUENCY: FREQUENCY: CONTINUOUS

## 2022-08-11 ASSESSMENT — PAIN DESCRIPTION - PAIN TYPE: TYPE: ACUTE PAIN

## 2022-08-11 NOTE — TELEPHONE ENCOUNTER
Patient called to initially request that refills be overwritten. Patient states that her medication refills that were sent to the pharmacy on 8/9/22 are not able to be filled at this time according to the pharmacy. Patient states that she is being told by the pharmacy that it is too soon to fill these medications. Patient is asking if provider can authorize an early refill. Patient is also complaining of worsening left leg pain with pain radiating from the buttocks down the lower extremity . Patient was advised that she may need to come back in for her worsening sx and she is okay with that.  Please advise

## 2022-08-11 NOTE — TELEPHONE ENCOUNTER
I just rechecked PDMP and the earliest fill date for the prescription I sent at her last appt is and should be 8/19/2022.

## 2022-08-12 ENCOUNTER — APPOINTMENT (OUTPATIENT)
Dept: CT IMAGING | Age: 77
End: 2022-08-12
Payer: MEDICARE

## 2022-08-12 VITALS
RESPIRATION RATE: 11 BRPM | OXYGEN SATURATION: 96 % | TEMPERATURE: 97.9 F | HEIGHT: 61 IN | WEIGHT: 219 LBS | DIASTOLIC BLOOD PRESSURE: 85 MMHG | HEART RATE: 68 BPM | BODY MASS INDEX: 41.35 KG/M2 | SYSTOLIC BLOOD PRESSURE: 177 MMHG

## 2022-08-12 PROCEDURE — 72192 CT PELVIS W/O DYE: CPT

## 2022-08-12 PROCEDURE — 6370000000 HC RX 637 (ALT 250 FOR IP): Performed by: EMERGENCY MEDICINE

## 2022-08-12 PROCEDURE — 6360000002 HC RX W HCPCS: Performed by: EMERGENCY MEDICINE

## 2022-08-12 PROCEDURE — 96372 THER/PROPH/DIAG INJ SC/IM: CPT

## 2022-08-12 RX ORDER — NAPROXEN 250 MG/1
500 TABLET ORAL ONCE
Status: COMPLETED | OUTPATIENT
Start: 2022-08-12 | End: 2022-08-12

## 2022-08-12 RX ORDER — MORPHINE SULFATE 4 MG/ML
4 INJECTION, SOLUTION INTRAMUSCULAR; INTRAVENOUS ONCE
Status: COMPLETED | OUTPATIENT
Start: 2022-08-12 | End: 2022-08-12

## 2022-08-12 RX ORDER — NAPROXEN 375 MG/1
375 TABLET, DELAYED RELEASE ORAL 2 TIMES DAILY PRN
Qty: 20 TABLET | Refills: 0 | Status: SHIPPED | OUTPATIENT
Start: 2022-08-12 | End: 2022-09-06 | Stop reason: SDUPTHER

## 2022-08-12 RX ADMIN — NAPROXEN 500 MG: 250 TABLET ORAL at 00:50

## 2022-08-12 RX ADMIN — MORPHINE SULFATE 4 MG: 4 INJECTION, SOLUTION INTRAMUSCULAR; INTRAVENOUS at 00:50

## 2022-08-12 ASSESSMENT — PAIN DESCRIPTION - LOCATION: LOCATION: BACK

## 2022-08-12 ASSESSMENT — PAIN SCALES - GENERAL
PAINLEVEL_OUTOF10: 8
PAINLEVEL_OUTOF10: 10

## 2022-08-12 ASSESSMENT — PAIN - FUNCTIONAL ASSESSMENT: PAIN_FUNCTIONAL_ASSESSMENT: NONE - DENIES PAIN

## 2022-08-12 NOTE — ED PROVIDER NOTES
CHIEF COMPLAINT    Chief Complaint   Patient presents with    Fall     Fall on Sunday, Seen at [facility]    Muscle Pain     Whole body     HPI  Ham Rios is a 68 y.o. female with history of chronic hip pain, osteoarthritis, chronic anemia, GERD who presents to the ED with complaints of pain to bilateral hips and legs. Patient had mechanical fall 4 days ago. She tripped on a rug causing her to fall onto her right side. She was seen at outside emergency department where she had x-ray imaging of right shoulder and right hip which were reassuring. However patient states that over the last 2 days she has been experiencing increasing pain to bilateral hips with radiation into bilateral legs. She is attempted to use home Percocet and Robaxin without relief. The pain is constant and describes a throbbing and stabbing pain. Her pain is exacerbated with movement and certain positions. She denies abdominal pain, numbness, tingling, nausea, vomiting    REVIEW OF SYSTEMS  Constitutional: No fever, chills or recent illness. Eye: No visual changes  HENT: No earache or sore throat. Resp: No SOB or productive cough. Cardio: No chest pain or palpitations. GI: No abdominal pain, nausea, vomiting, constipation or diarrhea. No melena. : No dysuria, urgency or frequency. Endocrine: No heat intolerance, no cold intolerance, no polydipsia   Lymphatics: No adenopathy  Musculoskeletal: Complains of bilateral hip pain and leg pain  Neuro: No headaches. Psych: No homicidal or suicidal thoughts  Skin: No rash, No itching. ?  ?   PAST MEDICAL HISTORY  Past Medical History:   Diagnosis Date    (HFpEF) heart failure with preserved ejection fraction (HCC)     Chronic anemia     Chronic pain     right hip and knee, pain management, Ligia Menjivar PA-C [place]    COVID-19 09/29/2021    Essential hypertension     GERD (gastroesophageal reflux disease)     H/O percutaneous left heart catheterization 12/13/2016    False positive. No CAD    History of nuclear stress test 12/06/2016    lexiscan-mild ischemia mid inferior,EF70%    Hx of echocardiogram 06/26/2017    EF 55-60%. Grade 1 diastolic dysfunction. Hyperhomocysteinemia (HCC)     Moderate depressive disorder (HCC)     Morbid obesity (HCC)     Neuropathy     Normocytic anemia     Orthostasis     Osteoarthritis     Sleep apnea     Spinal stenosis of lumbar region      FAMILY HISTORY  Family History   Problem Relation Age of Onset    Cancer Mother     High Blood Pressure Mother     Stroke Mother     Heart Disease Father     High Blood Pressure Father     Cancer Maternal Aunt     Diabetes type 2  Daughter     Coronary Art Dis Daughter     Obesity Daughter     COPD Daughter     No Known Problems Daughter     No Known Problems Son     No Known Problems Son     No Known Problems Son     No Known Problems Son     No Known Problems Son     No Known Problems Son     No Known Problems Sister     No Known Problems Brother     No Known Problems Sister     No Known Problems Sister     No Known Problems Sister     No Known Problems Brother      SOCIAL HISTORY  Social History     Socioeconomic History    Marital status:       Spouse name: None    Number of children: 8    Years of education: None    Highest education level: None   Occupational History    Occupation: management     Comment: /retired   Tobacco Use    Smoking status: Never    Smokeless tobacco: Never   Vaping Use    Vaping Use: Never used   Substance and Sexual Activity    Alcohol use: No    Drug use: No    Sexual activity: Not Currently     Partners: Male     Social Determinants of Health     Financial Resource Strain: Low Risk     Difficulty of Paying Living Expenses: Not hard at all   Food Insecurity: No Food Insecurity    Worried About Running Out of Food in the Last Year: Never true    [address] in the Last Year: Never true       SURGICAL HISTORY  Past Surgical History:   Procedure Laterality Date    CATARACT REMOVAL  2008    CHOLECYSTECTOMY      EYE SURGERY      cataracts    GASTRIC BYPASS SURGERY  2010    HYSTERECTOMY (CERVIX STATUS UNKNOWN)      JOINT REPLACEMENT Left 09/17/2018    [facility]    JOINT REPLACEMENT Right 2003    Dr. Edie Jimenes  2006    total lt shoulder     SHOULDER SURGERY  2006    total rt shoulder    TOTAL KNEE ARTHROPLASTY Right     TOTAL KNEE ARTHROPLASTY  02/2011    left     CURRENT MEDICATIONS  Discharge Medication List as of 8/12/2022  2:24 AM        CONTINUE these medications which have NOT CHANGED    Details   oxyCODONE-acetaminophen (PERCOCET) 7.5-325 MG per tablet Take 1 tablet by mouth every 6 hours as needed for Pain (but be sure #100 pills lasts 30 days.) for up to 30 days. , Disp-100 tablet, R-0Normal      traZODone (DESYREL) 100 MG tablet Take 1 tablet by mouth nightly, Disp-90 tablet, R-0Normal      loperamide (RA ANTI-DIARRHEAL) 2 MG capsule Take 1 capsule by mouth 2 times daily as needed (stool leakage), Disp-180 capsule, R-1Normal      sertraline (ZOLOFT) 100 MG tablet Take 1 tablet by mouth nightly, Disp-90 tablet, R-1Normal      fluticasone-vilanterol (BREO ELLIPTA) 100-25 MCG/INH AEPB inhaler Inhale 1 puff into the lungs in the morning., Disp-3 each, R-3Normal      omeprazole (PRILOSEC) 20 MG delayed release capsule Take 1 capsule by mouth 2 times daily as needed (stomach symptoms), Disp-180 capsule, R-1Normal      famotidine (PEPCID) 40 MG tablet Take 1 tablet by mouth at bedtime, Disp-90 tablet, R-1Normal      Magnesium 100 MG TABS Take 1 tablet by mouth daily, Disp-30 tablet, R-0Normal      MYRBETRIQ 25 MG TB24 TAKE 1 TABLET BY MOUTH DAILY, Disp-90 tablet, R-3, DAWNormal      rOPINIRole (REQUIP) 0.5 MG tablet Take 1 tablet by mouth 3 times daily, Disp-270 tablet, R-2Normal      hydrOXYzine (ATARAX) 25 MG tablet Take 1 tablet by mouth every 8 hours as needed for Anxiety, Disp-60 tablet, R-2Normal      albuterol sulfate HFA (VENTOLIN HFA) 108 (90 utilized as indicated for entire patient encounter? Time of Disposition: See timeline  ? Discharge Medication List as of 8/12/2022  2:24 AM        START taking these medications    Details   Naproxen (EC NAPROSYN) 375 MG EC tablet Take 1 tablet by mouth 2 times daily as needed for Pain, Disp-20 tablet, R-0Normal           FINAL IMPRESSION  1. Contusion of right hip, initial encounter    2. Spasm of muscle        Electronically signed by:  [address], DO, 8/12/2022         [address], DO  08/12/22 [id number]

## 2022-08-12 NOTE — ED NOTES
Discharge instructions reviewed with patient and all questions addressed. Patient alert and oriented x4 at time of discharge. Patient taken by wheelchair with family. IV removed and dressing applied.       Anni Gunderson RN  08/12/22 [id number]

## 2022-08-22 ENCOUNTER — TELEPHONE (OUTPATIENT)
Dept: FAMILY MEDICINE CLINIC | Age: 77
End: 2022-08-22

## 2022-08-22 DIAGNOSIS — K21.9 GASTROESOPHAGEAL REFLUX DISEASE WITHOUT ESOPHAGITIS: Primary | ICD-10-CM

## 2022-08-22 DIAGNOSIS — G25.81 RLS (RESTLESS LEGS SYNDROME): ICD-10-CM

## 2022-08-22 RX ORDER — OMEPRAZOLE 40 MG/1
40 CAPSULE, DELAYED RELEASE ORAL
Qty: 90 CAPSULE | Refills: 1 | Status: SHIPPED | OUTPATIENT
Start: 2022-08-22 | End: 2022-09-06 | Stop reason: DRUGHIGH

## 2022-08-22 RX ORDER — GABAPENTIN 300 MG/1
300 CAPSULE ORAL 2 TIMES DAILY
Qty: 60 CAPSULE | Refills: 0 | Status: SHIPPED | OUTPATIENT
Start: 2022-08-22 | End: 2022-08-30 | Stop reason: SDUPTHER

## 2022-08-22 NOTE — TELEPHONE ENCOUNTER
Pt is asking if she can be subscribed some gabapentin for pain in both legs. She states she took of her sons on Saturday.   Pt also requests to be taken off pepcid and wants to be on omeprazole

## 2022-08-30 RX ORDER — GABAPENTIN 300 MG/1
300 CAPSULE ORAL 3 TIMES DAILY
Qty: 90 CAPSULE | Refills: 0 | Status: SHIPPED | OUTPATIENT
Start: 2022-09-11 | End: 2022-09-06 | Stop reason: SDUPTHER

## 2022-08-30 NOTE — TELEPHONE ENCOUNTER
She can increase the gabapentin to 300 mg 3 times a day. I did send a prescription for 90 tablets that she can get filled on approximately September 11. The 60 tablets that she picked up about a week ago should last until September 11 or 12. For the ankle swelling I recommend elevation. (Checked PDMP. )

## 2022-08-30 NOTE — CARE COORDINATION
[facility] Transitions Follow Up Call    2021    Patient: Chan Silverio  Patient : 1945   MRN: [id number]  Reason for Admission: PNA  Discharge Date: 10/1/21 RARS: Readmission Risk Score: 9         Spoke with: Patient's family member    Contacted patient for final BPCI-A follow up. Spoke with patient's family member. She states that Lupis Arora is doing okay. She states all of her symptoms have subsided. Reports Lupis Arora may become short of breath with exertion but it has improved. She is eating and drinking fluids w/o issues. Discussed when to contact provider with any new or worsening symptoms. She verbalized understanding. No questions or concerns at this time. No further outreach. Care Transitions Subsequent and Final Call    Subsequent and Final Calls  Do you have any ongoing symptoms?: Yes  Patient-reported symptoms: Shortness of Breath  Do you currently have any active services?: No  Are you currently active with any services?: Home Health  Do you have any needs or concerns that I can assist you with?: No  Care Transitions Interventions   Home Care Waiver: Completed        Transportation Support: Declined      DME Assistance: Declined     Senior Services: Declined    Other Interventions:            Follow Up  Future Appointments   Date Time Provider Johnny Chris   2022  9:00 AM [facility] DANIS KINGN [facility] YOLANDA Moise RN Additional Area 2 Location: lower lip Show Depressor Anguli Units: Yes Inferior Lateral Orbicularis Oculi Units: 0 Show Right And Left Brow Units: No Post-Care Instructions: Patient instructed to not lie down for 4 hours and limit physical activity for 24 hours. Expiration Date (Month Year): 05/2024 Detail Level: Detailed Additional Area 2 Units: 4 Lot #: [id number] Price (Use Numbers Only, No Special Characters Or $): 104 Additional Area 1 Location: upper lip Consent: Written consent obtained. Risks include but not limited to lid/brow ptosis, bruising, swelling, diplopia, temporary effect, incomplete chemical denervation. Reconstitution Date (Optional): 8/24/22

## 2022-08-31 ENCOUNTER — TELEPHONE (OUTPATIENT)
Dept: FAMILY MEDICINE CLINIC | Age: 77
End: 2022-08-31

## 2022-08-31 NOTE — TELEPHONE ENCOUNTER
Pt requesting something for pain from her sciatic nerve.  The gabapentin isn't strong enough for the pain she is experiencing

## 2022-09-01 NOTE — TELEPHONE ENCOUNTER
We can talk about it at her next appointment. If she would like to try pregabalin instead of gabapentin that is a possibility.

## 2022-09-01 NOTE — TELEPHONE ENCOUNTER
Patient called and stated she has been elevated feet and still has ankle swelling.     Been swollen last 2-3 weeks

## 2022-09-06 ENCOUNTER — OFFICE VISIT (OUTPATIENT)
Dept: FAMILY MEDICINE CLINIC | Age: 77
End: 2022-09-06
Payer: MEDICARE

## 2022-09-06 VITALS
SYSTOLIC BLOOD PRESSURE: 126 MMHG | DIASTOLIC BLOOD PRESSURE: 80 MMHG | WEIGHT: 220.6 LBS | HEART RATE: 70 BPM | OXYGEN SATURATION: 97 % | HEIGHT: 61 IN | BODY MASS INDEX: 41.65 KG/M2

## 2022-09-06 DIAGNOSIS — M48.062 SPINAL STENOSIS OF LUMBAR REGION WITH NEUROGENIC CLAUDICATION: Primary | ICD-10-CM

## 2022-09-06 DIAGNOSIS — K21.9 GASTROESOPHAGEAL REFLUX DISEASE WITHOUT ESOPHAGITIS: ICD-10-CM

## 2022-09-06 DIAGNOSIS — G25.81 RLS (RESTLESS LEGS SYNDROME): ICD-10-CM

## 2022-09-06 DIAGNOSIS — F51.04 PSYCHOPHYSIOLOGICAL INSOMNIA: ICD-10-CM

## 2022-09-06 DIAGNOSIS — F32.A MODERATE DEPRESSIVE DISORDER: ICD-10-CM

## 2022-09-06 DIAGNOSIS — E83.42 HYPOMAGNESEMIA: ICD-10-CM

## 2022-09-06 DIAGNOSIS — N18.31 STAGE 3A CHRONIC KIDNEY DISEASE (HCC): ICD-10-CM

## 2022-09-06 DIAGNOSIS — F41.1 GAD (GENERALIZED ANXIETY DISORDER): ICD-10-CM

## 2022-09-06 PROCEDURE — 1090F PRES/ABSN URINE INCON ASSESS: CPT | Performed by: FAMILY MEDICINE

## 2022-09-06 PROCEDURE — G8400 PT W/DXA NO RESULTS DOC: HCPCS | Performed by: FAMILY MEDICINE

## 2022-09-06 PROCEDURE — 1123F ACP DISCUSS/DSCN MKR DOCD: CPT | Performed by: FAMILY MEDICINE

## 2022-09-06 PROCEDURE — 1036F TOBACCO NON-USER: CPT | Performed by: FAMILY MEDICINE

## 2022-09-06 PROCEDURE — 99214 OFFICE O/P EST MOD 30 MIN: CPT | Performed by: FAMILY MEDICINE

## 2022-09-06 PROCEDURE — G8417 CALC BMI ABV UP PARAM F/U: HCPCS | Performed by: FAMILY MEDICINE

## 2022-09-06 PROCEDURE — G8427 DOCREV CUR MEDS BY ELIG CLIN: HCPCS | Performed by: FAMILY MEDICINE

## 2022-09-06 RX ORDER — TRAZODONE HYDROCHLORIDE 100 MG/1
100 TABLET ORAL NIGHTLY
Qty: 90 TABLET | Refills: 1 | Status: SHIPPED | OUTPATIENT
Start: 2022-09-06 | End: 2022-10-11 | Stop reason: SDUPTHER

## 2022-09-06 RX ORDER — NAPROXEN 375 MG/1
375 TABLET, DELAYED RELEASE ORAL 2 TIMES DAILY PRN
Qty: 60 TABLET | Refills: 1 | Status: SHIPPED | OUTPATIENT
Start: 2022-09-06 | End: 2022-09-28

## 2022-09-06 RX ORDER — MELOXICAM 15 MG/1
TABLET ORAL
COMMUNITY
Start: 2022-08-05 | End: 2022-09-06

## 2022-09-06 RX ORDER — OXYCODONE AND ACETAMINOPHEN 7.5; 325 MG/1; MG/1
1 TABLET ORAL EVERY 6 HOURS PRN
Qty: 100 TABLET | Refills: 0 | Status: SHIPPED | OUTPATIENT
Start: 2022-09-17 | End: 2022-10-17

## 2022-09-06 RX ORDER — GABAPENTIN 400 MG/1
400 CAPSULE ORAL 3 TIMES DAILY
Qty: 90 CAPSULE | Refills: 2 | Status: SHIPPED | OUTPATIENT
Start: 2022-09-11 | End: 2022-10-11 | Stop reason: SINTOL

## 2022-09-06 RX ORDER — OMEPRAZOLE 20 MG/1
20 CAPSULE, DELAYED RELEASE ORAL 2 TIMES DAILY PRN
Qty: 180 CAPSULE | Refills: 1 | Status: SHIPPED | OUTPATIENT
Start: 2022-09-06 | End: 2022-10-11 | Stop reason: SDUPTHER

## 2022-09-06 RX ORDER — NAPROXEN 375 MG/1
TABLET, DELAYED RELEASE ORAL
Qty: 180 TABLET | OUTPATIENT
Start: 2022-09-06

## 2022-09-06 RX ORDER — CALCIUM CARBONATE 300MG(750)
1 TABLET,CHEWABLE ORAL DAILY
Qty: 90 TABLET | Refills: 1 | Status: SHIPPED | OUTPATIENT
Start: 2022-09-06

## 2022-09-06 RX ORDER — METHOCARBAMOL 500 MG/1
TABLET, FILM COATED ORAL
COMMUNITY
Start: 2022-08-05

## 2022-09-06 ASSESSMENT — ENCOUNTER SYMPTOMS
NAUSEA: 0
DIARRHEA: 0
VOMITING: 0
COUGH: 0
SHORTNESS OF BREATH: 0

## 2022-09-06 NOTE — PROGRESS NOTES
9/6/22    Aundrea Deming  1945    MARGARITO CASTELLANOS is a 68 y.o. female who presents today for evaluation of:  Chief Complaint   Patient presents with    Other     Leg swelling and pain     Leg swelling : both legs swollen x about 1-2 weeks (2 w for L and 1 wk for R) She has been staying with mother x 1 month but that has not affected how she eats or activity level. No PND or orthopnea. Pain in legs is a very very sharp on Left side and now starting to affect. She gets cramps happen. She occasionally drinks small amounts of pickle juice. She uses some Voltaren. Gerd : prefers bid omeprazole. Depression : she has been not taking sertraline due to taking trazodone. Review of Systems   Respiratory:  Negative for cough and shortness of breath. Cardiovascular:  Positive for leg swelling. Negative for chest pain and palpitations. Gastrointestinal:  Negative for diarrhea, nausea and vomiting. Allergies   Allergen Reactions    Latex     Prednisone Hives        OBJECTIVE    /80 (Site: Left Upper Arm, Position: Sitting, Cuff Size: Large Adult)   Pulse 70   Ht 5' 1\" (1.549 m)   Wt 220 lb 9.6 oz (100.1 kg)   SpO2 97%   BMI 41.68 kg/m²     Physical Exam   Constitutional:       General: Not in acute distress. Appearance: Normal appearance. Not ill-appearing. Eyes:      General: No scleral icterus. Cardiovascular:      Rate and Rhythm: Normal rate and regular rhythm. Heart sounds: No murmur heard. No friction rub. No gallop. Pulmonary:      Effort: Pulmonary effort is normal. No respiratory distress. Breath sounds: No wheezing, rhonchi or rales. Abdominal:      Palpations: Abdomen is soft. There is no mass. Tenderness: There is no abdominal tenderness. Musculoskeletal:     Moves all extremities normally. Both legs have mild to moderate edema that is not pitting. Skin:     General: Skin is warm. Coloration: Skin is not jaundiced.    Neurological: tablets to hopefully help with the leg cramping. I cautioned her that pickle juice should not be drinking large quantities but that in small amounts it might be helpful. Orders:  -     Magnesium 400 MG TABS; Take 1 tablet by mouth daily, Disp-90 tablet, R-1Normal  7. CKD, Stage 3a chronic kidney disease ([facility] [address]  Assessment & Plan:   Her most recent EGFR was greater than 60.  8. Moderate depressive disorder ([email or web address]  Assessment & Plan:   Her depression has taken a turn for the worse since she stopped sertraline due to a perceived contraindication with trazodone. I told her that a lower dose of sertraline should be safe along with the trazodone and so she will restart sertraline at 50 mg a day. Counseling provided for:  Healthy eating - Avoid sugar and other refined carbohydrates. >> Exercise - 1> try to do 150 minutes a week of exercise that is as hard as walking briskly (30 minutes 5 days a week or 22 minutes every day); and 2> do some strength training 2 or 3 times a week. Return in about 5 weeks (around 10/11/2022).    Berkley Rosen MD

## 2022-09-06 NOTE — ASSESSMENT & PLAN NOTE
Her depression has taken a turn for the worse since she stopped sertraline due to a perceived contraindication with trazodone. I told her that a lower dose of sertraline should be safe along with the trazodone and so she will restart sertraline at 50 mg a day.

## 2022-09-06 NOTE — ASSESSMENT & PLAN NOTE
She prefers to take the omeprazole twice a day so I changed the prescription to omeprazole 20 mg twice a day.

## 2022-09-09 ENCOUNTER — NURSE TRIAGE (OUTPATIENT)
Dept: OTHER | Facility: CLINIC | Age: 77
End: 2022-09-09

## 2022-09-09 NOTE — TELEPHONE ENCOUNTER
Called and left a message that she should go to the emergency department if she thought her symptoms were severe or dangerous. Otherwise she could possibly go to the resource center but I am not sure how to tell her to get an appointment at the resource center over the weekend. Otherwise she could go to an urgent care in [facility]. Otherwise if she thinks that the problem can wait until Monday she can call our office on Monday. I am going to route this message to our office staff so that they can possibly get her in for an appointment on Monday if the problem is not taken care of by then.

## 2022-09-09 NOTE — TELEPHONE ENCOUNTER
Received call from Tiffanie Martines at [facility] with [facility]. Subjective: Caller states \"feet and hand swelling, both feet and legs, and right hand, feels like her face is swollen\" Denies HTN, DM    Current Symptoms: see above, feet swelling up to her knees    Onset: 2 weeks ago; worsening    Associated Symptoms: reduced activity    Pain Severity: 5/10; dull; intermittent in her waist down, does have sciatic nerve issues    Temperature: denies fever     What has been tried: nothing    LMP: NA Pregnant: NA    Recommended disposition: See in Office Today    Care advice provided, patient verbalizes understanding; denies any other questions or concerns; instructed to call back for any new or worsening symptoms. Patient/Caller agrees with recommended disposition; writer provided warm transfer to [id number] at [facility] for appointment scheduling     Attention Provider: Thank you for allowing me to participate in the care of your patient. The patient was connected to triage in response to information provided to the ECC/PSC. Please do not respond through this encounter as the response is not directed to a shared pool.     Reason for Disposition   MODERATE swelling of both ankles (e.g., swelling extends up to the knees) AND new-onset or worsening    Protocols used: Leg Swelling and Edema-ADULT-[place]

## 2022-09-27 DIAGNOSIS — M48.062 SPINAL STENOSIS OF LUMBAR REGION WITH NEUROGENIC CLAUDICATION: ICD-10-CM

## 2022-09-28 RX ORDER — NAPROXEN 375 MG/1
375 TABLET, DELAYED RELEASE ORAL 2 TIMES DAILY WITH MEALS
Qty: 180 TABLET | Refills: 0 | Status: SHIPPED | OUTPATIENT
Start: 2022-09-28

## 2022-10-03 ENCOUNTER — TELEPHONE (OUTPATIENT)
Dept: FAMILY MEDICINE CLINIC | Age: 77
End: 2022-10-03

## 2022-10-03 DIAGNOSIS — M48.062 SPINAL STENOSIS OF LUMBAR REGION WITH NEUROGENIC CLAUDICATION: Primary | ICD-10-CM

## 2022-10-03 NOTE — TELEPHONE ENCOUNTER
Pt called in wanting to know if  thinks that physical therapy would help with her sciatic nerve pain. Pt also stated that the swelling in her ankles is so bad that she can barley walk.

## 2022-10-04 NOTE — TELEPHONE ENCOUNTER
I put in a referral to PT for the sciatica and spinal stenosis. Also I recommend elevating legs and compression stockings. The gabapentin might contribute to the leg swelling.

## 2022-10-06 ENCOUNTER — TELEPHONE (OUTPATIENT)
Dept: FAMILY MEDICINE CLINIC | Age: 77
End: 2022-10-06

## 2022-10-06 DIAGNOSIS — M16.0 PRIMARY OSTEOARTHRITIS OF BOTH HIPS: ICD-10-CM

## 2022-10-06 DIAGNOSIS — M48.062 SPINAL STENOSIS OF LUMBAR REGION WITH NEUROGENIC CLAUDICATION: Primary | ICD-10-CM

## 2022-10-06 NOTE — TELEPHONE ENCOUNTER
Lakeisha the  with [address] [place] [address] [employer] left a message stating she did a home visit with patient. She stated patient is requesting a Rolator walker and a bath tub rail. Lakeisha stated she spoke to [facility] pharmacy and they have both there. She stated orders can be faxed to [phone]. Lakeisha can be reached at [phone] with any questions.

## 2022-10-11 ENCOUNTER — OFFICE VISIT (OUTPATIENT)
Dept: FAMILY MEDICINE CLINIC | Age: 77
End: 2022-10-11
Payer: MEDICARE

## 2022-10-11 VITALS
DIASTOLIC BLOOD PRESSURE: 70 MMHG | BODY MASS INDEX: 40.33 KG/M2 | SYSTOLIC BLOOD PRESSURE: 122 MMHG | TEMPERATURE: 97.5 F | HEIGHT: 61 IN | HEART RATE: 59 BPM | OXYGEN SATURATION: 97 % | WEIGHT: 213.6 LBS

## 2022-10-11 DIAGNOSIS — M54.42 CHRONIC LEFT-SIDED LOW BACK PAIN WITH LEFT-SIDED SCIATICA: ICD-10-CM

## 2022-10-11 DIAGNOSIS — J31.0 NONALLERGIC RHINITIS: ICD-10-CM

## 2022-10-11 DIAGNOSIS — F41.1 GAD (GENERALIZED ANXIETY DISORDER): ICD-10-CM

## 2022-10-11 DIAGNOSIS — H53.9 VISION CHANGES: ICD-10-CM

## 2022-10-11 DIAGNOSIS — K21.9 GASTROESOPHAGEAL REFLUX DISEASE WITHOUT ESOPHAGITIS: ICD-10-CM

## 2022-10-11 DIAGNOSIS — Z23 IMMUNIZATION DUE: ICD-10-CM

## 2022-10-11 DIAGNOSIS — E66.01 CLASS 3 OBESITY (HCC): ICD-10-CM

## 2022-10-11 DIAGNOSIS — N39.41 URGE INCONTINENCE: ICD-10-CM

## 2022-10-11 DIAGNOSIS — F51.04 PSYCHOPHYSIOLOGICAL INSOMNIA: ICD-10-CM

## 2022-10-11 DIAGNOSIS — G89.29 CHRONIC LEFT-SIDED LOW BACK PAIN WITH LEFT-SIDED SCIATICA: ICD-10-CM

## 2022-10-11 DIAGNOSIS — J45.30 MILD PERSISTENT ASTHMA, UNSPECIFIED WHETHER COMPLICATED: ICD-10-CM

## 2022-10-11 DIAGNOSIS — G25.81 RLS (RESTLESS LEGS SYNDROME): ICD-10-CM

## 2022-10-11 DIAGNOSIS — M48.062 SPINAL STENOSIS OF LUMBAR REGION WITH NEUROGENIC CLAUDICATION: Primary | ICD-10-CM

## 2022-10-11 PROCEDURE — G8484 FLU IMMUNIZE NO ADMIN: HCPCS | Performed by: FAMILY MEDICINE

## 2022-10-11 PROCEDURE — 1123F ACP DISCUSS/DSCN MKR DOCD: CPT | Performed by: FAMILY MEDICINE

## 2022-10-11 PROCEDURE — 1036F TOBACCO NON-USER: CPT | Performed by: FAMILY MEDICINE

## 2022-10-11 PROCEDURE — G8400 PT W/DXA NO RESULTS DOC: HCPCS | Performed by: FAMILY MEDICINE

## 2022-10-11 PROCEDURE — G0008 ADMIN INFLUENZA VIRUS VAC: HCPCS | Performed by: FAMILY MEDICINE

## 2022-10-11 PROCEDURE — G8427 DOCREV CUR MEDS BY ELIG CLIN: HCPCS | Performed by: FAMILY MEDICINE

## 2022-10-11 PROCEDURE — 99214 OFFICE O/P EST MOD 30 MIN: CPT | Performed by: FAMILY MEDICINE

## 2022-10-11 PROCEDURE — 1090F PRES/ABSN URINE INCON ASSESS: CPT | Performed by: FAMILY MEDICINE

## 2022-10-11 PROCEDURE — G8417 CALC BMI ABV UP PARAM F/U: HCPCS | Performed by: FAMILY MEDICINE

## 2022-10-11 PROCEDURE — 0509F URINE INCON PLAN DOCD: CPT | Performed by: FAMILY MEDICINE

## 2022-10-11 PROCEDURE — 90694 VACC AIIV4 NO PRSRV 0.5ML IM: CPT | Performed by: FAMILY MEDICINE

## 2022-10-11 RX ORDER — NALOXONE HYDROCHLORIDE 4 MG/.1ML
1 SPRAY NASAL PRN
Qty: 1 EACH | Refills: 5 | Status: SHIPPED | OUTPATIENT
Start: 2022-10-11

## 2022-10-11 RX ORDER — FLUTICASONE FUROATE AND VILANTEROL 100; 25 UG/1; UG/1
1 POWDER RESPIRATORY (INHALATION) DAILY
Qty: 3 EACH | Refills: 3 | Status: SHIPPED | OUTPATIENT
Start: 2022-10-11

## 2022-10-11 RX ORDER — OMEPRAZOLE 40 MG/1
40 CAPSULE, DELAYED RELEASE ORAL DAILY
Qty: 90 CAPSULE | Refills: 1 | Status: SHIPPED | OUTPATIENT
Start: 2022-10-11

## 2022-10-11 RX ORDER — HYDROXYZINE HYDROCHLORIDE 25 MG/1
25 TABLET, FILM COATED ORAL EVERY 8 HOURS PRN
Qty: 60 TABLET | Refills: 2 | Status: SHIPPED | OUTPATIENT
Start: 2022-10-11

## 2022-10-11 RX ORDER — OXYCODONE AND ACETAMINOPHEN 7.5; 325 MG/1; MG/1
1 TABLET ORAL EVERY 6 HOURS PRN
Qty: 100 TABLET | Refills: 0 | Status: CANCELLED | OUTPATIENT
Start: 2022-10-11 | End: 2022-11-10

## 2022-10-11 RX ORDER — ROPINIROLE 1 MG/1
1 TABLET, FILM COATED ORAL NIGHTLY
Qty: 90 TABLET | Refills: 1 | Status: SHIPPED | OUTPATIENT
Start: 2022-10-11

## 2022-10-11 RX ORDER — MIRABEGRON 25 MG/1
TABLET, FILM COATED, EXTENDED RELEASE ORAL
Qty: 90 TABLET | Refills: 3 | Status: SHIPPED | OUTPATIENT
Start: 2022-10-11

## 2022-10-11 RX ORDER — FLUTICASONE PROPIONATE 50 MCG
2 SPRAY, SUSPENSION (ML) NASAL DAILY
Qty: 16 G | Refills: 5 | Status: SHIPPED | OUTPATIENT
Start: 2022-10-11

## 2022-10-11 RX ORDER — OXYCODONE AND ACETAMINOPHEN 10; 325 MG/1; MG/1
1 TABLET ORAL EVERY 6 HOURS PRN
Qty: 100 TABLET | Refills: 0 | Status: SHIPPED | OUTPATIENT
Start: 2022-10-14 | End: 2022-11-13

## 2022-10-11 RX ORDER — MAGNESIUM OXIDE TAB 400 MG (241.3 MG ELEMENTAL MG) 400 (241.3 MG) MG
TAB ORAL
COMMUNITY
Start: 2022-09-06

## 2022-10-11 RX ORDER — TRAZODONE HYDROCHLORIDE 100 MG/1
100 TABLET ORAL NIGHTLY
Qty: 90 TABLET | Refills: 1 | Status: SHIPPED | OUTPATIENT
Start: 2022-10-11

## 2022-10-11 RX ORDER — FLUTICASONE PROPIONATE 50 MCG
SPRAY, SUSPENSION (ML) NASAL
Qty: 48 G | OUTPATIENT
Start: 2022-10-11

## 2022-10-11 ASSESSMENT — ENCOUNTER SYMPTOMS
VOMITING: 0
DIARRHEA: 0
NAUSEA: 0

## 2022-10-11 NOTE — ASSESSMENT & PLAN NOTE
Her spinal stenosis pain is not quite adequately controlled with 7.5 mg 3 and occasionally 4 times a day. I will increase the oxycodone dose to 10 mg 3 and occasionally 4 times a day for this upcoming month during which she will be doing physical therapy. I wrote the prescription so it can be released a couple of days early. I am suspicious that she may have taken this past months supply too quickly because she reported that she is going to be in a real bad mood tomorrow. She signed a new opioid contract today. A prescription for Narcan was generated since the dose of oxycodone is high.

## 2022-10-11 NOTE — PROGRESS NOTES
10/11/22    Kaiser Jonas  1945    SUBJECTIVE    HPI - Carole Lozano is a 68 y.o. female who presents today for evaluation of:  Chief Complaint   Patient presents with    Follow-up     5 week follow up pain med refill    Immunizations     Spinal stenosis : gabapentin helpsed pain but made legs swell. Percocet 7.5 is not currently controlling the pain and she would like to increase to 10mg for a month. She will be doing physical therapy which will hurt more this coming month. She is willing to reduce the dose of oxycodone when the physical therapy finishes. Gabapentin was somewhat helpful for the pain but caused ankle swelling. The ankle swelling got better when she stopped taking gabapentin. Resp : breathign is good. Wt loss has helped. Anxiety : hydrozyzine helps.  : Uses Myrbetriq only prn and it helps. GI ; Thinks we need to increase the omeprazole back to 40mg. Mood : doing well with sertraline. Restless legs. : Wants it increase ropinerole since 0.5mg is not working. Review of Systems   Cardiovascular:  Negative for chest pain and palpitations. Gastrointestinal:  Negative for diarrhea, nausea and vomiting. Allergies   Allergen Reactions    Latex     Prednisone Hives        OBJECTIVE    /70 (Site: Right Upper Arm, Position: Sitting, Cuff Size: Medium Adult)   Pulse 59   Temp 97.5 °F (36.4 °C) (Infrared)   Ht 5' 1\" (1.549 m)   Wt 213 lb 9.6 oz (96.9 kg)   SpO2 97%   BMI 40.36 kg/m²     Physical Exam   Constitutional:       General: Not in acute distress. Appearance: Normal appearance. Not ill-appearing. Eyes:      General: No scleral icterus. Cardiovascular:      Rate and Rhythm: Normal rate and regular rhythm. Heart sounds: No murmur heard. No friction rub. No gallop. Pulmonary:      Effort: Pulmonary effort is normal. No respiratory distress. Breath sounds: No wheezing, rhonchi or rales. Abdominal:      Palpations: Abdomen is soft.  There is no mass.      Tenderness: There is no abdominal tenderness. Musculoskeletal:     Moves all extremities normally. Skin:     General: Skin is warm. Coloration: Skin is not jaundiced. Neurological:      Mental Status: Patient is alert. Psychiatric:         Behavior: Behavior normal.         Thought Content: Thought content normal.         Judgment: Judgment normal.    Reviewed:  PDMP shows #100 oxycodone on 7/20/22 and 8/18/2022 and 9/17/2022. ASSESSMENT/PLAN:    1. Spinal stenosis of lumbar region with neurogenic claudication  Assessment & Plan:   Her spinal stenosis pain is not quite adequately controlled with 7.5 mg 3 and occasionally 4 times a day. I will increase the oxycodone dose to 10 mg 3 and occasionally 4 times a day for this upcoming month during which she will be doing physical therapy. I wrote the prescription so it can be released a couple of days early. I am suspicious that she may have taken this past months supply too quickly because she reported that she is going to be in a real bad mood tomorrow. She signed a new opioid contract today. A prescription for Narcan was generated since the dose of oxycodone is high. Orders:  -     oxyCODONE-acetaminophen (PERCOCET)  MG per tablet; Take 1 tablet by mouth every 6 hours as needed for Pain for up to 30 days. Intended supply: 30 days Limit to three tabs per day on most days. , Disp-100 tablet, R-0Normal  -     naloxone 4 MG/0.1ML LIQD nasal spray; 1 spray by Nasal route as needed for Opioid Reversal, Disp-1 each, R-5Normal  2. Psychophysiological insomnia  Assessment & Plan:   Refill trazodone for insomnia. Orders:  -     traZODone (DESYREL) 100 MG tablet; Take 1 tablet by mouth nightly, Disp-90 tablet, R-1Normal  3. CHARLIE (generalized anxiety disorder)  Assessment & Plan:   Continue Zoloft and Atarax for anxiety. Anxiety is stable. Orders:  -     sertraline (ZOLOFT) 50 MG tablet;  Take 1 tablet by mouth nightly, Disp-90 tablet, R-1Normal  -     hydrOXYzine HCl (ATARAX) 25 MG tablet; Take 1 tablet by mouth every 8 hours as needed for Anxiety, Disp-60 tablet, R-2Normal  4. RLS (restless legs syndrome)  Assessment & Plan:   Restless leg syndrome is not adequately controlled so I will increase the dose to 1 mg nightly. Orders:  -     rOPINIRole (REQUIP) 1 MG tablet; Take 1 tablet by mouth at bedtime, Disp-90 tablet, R-1Normal  5. Back Pain, Chronic left-sided low back pain with left-sided sciatica  -     oxyCODONE-acetaminophen (PERCOCET)  MG per tablet; Take 1 tablet by mouth every 6 hours as needed for Pain for up to 30 days. Intended supply: 30 days Limit to three tabs per day on most days. , Disp-100 tablet, R-0Normal  -     naloxone 4 MG/0.1ML LIQD nasal spray; 1 spray by Nasal route as needed for Opioid Reversal, Disp-1 each, R-5Normal  6. Gastroesophageal reflux disease without esophagitis  Assessment & Plan:   She feels she needs the stronger dose of omeprazole so I will the prescription back to 40 mg daily. I told her that if insurance tells me they will only pay for the 20 mg I might change it back and she may need to deal with that by buying supplements very omeprazole off the shelf. Orders:  -     omeprazole (PRILOSEC) 40 MG delayed release capsule; Take 1 capsule by mouth Daily, Disp-90 capsule, R-1Normal  7. Urge incontinence  Assessment & Plan:   Continue as needed Myrbetriq for urinary incontinence  Orders:  -     MYRBETRIQ 25 MG TB24; TAKE 1 TABLET BY MOUTH DAILY, Disp-90 tablet, R-3, DAWNormal  8. Mild persistent asthma, unspecified whether complicated  Assessment & Plan:   Refill Breo which helps to control her asthma. Orders:  -     fluticasone-vilanterol (BREO ELLIPTA) 100-25 MCG/INH AEPB inhaler; Inhale 1 puff into the lungs daily, Disp-3 each, R-3Normal  9. Class 3 obesity (HCC)  Assessment & Plan:   She has lost weight recently.   10. Nonallergic rhinitis  -     fluticasone (FLONASE) 50 MCG/ACT nasal spray; 2 sprays by Each Nostril route daily, Disp-16 g, R-5Normal  11. Vision changes  -     Amb External Referral To Ophthalmology  12. Immunization due  -     Influenza, FLUAD, (age 72 y+), IM, Preservative Free, 0.5 mL          Return in about 4 weeks (around 11/8/2022) for back pain.    Noble Garcia MD

## 2022-10-11 NOTE — ASSESSMENT & PLAN NOTE
She feels she needs the stronger dose of omeprazole so I will the prescription back to 40 mg daily. I told her that if insurance tells me they will only pay for the 20 mg I might change it back and she may need to deal with that by buying supplements very omeprazole off the shelf.

## 2022-11-08 ENCOUNTER — OFFICE VISIT (OUTPATIENT)
Dept: FAMILY MEDICINE CLINIC | Age: 77
End: 2022-11-08
Payer: MEDICARE

## 2022-11-08 VITALS
HEIGHT: 61 IN | SYSTOLIC BLOOD PRESSURE: 136 MMHG | HEART RATE: 60 BPM | WEIGHT: 215.8 LBS | OXYGEN SATURATION: 97 % | TEMPERATURE: 97.3 F | DIASTOLIC BLOOD PRESSURE: 81 MMHG | BODY MASS INDEX: 40.75 KG/M2

## 2022-11-08 DIAGNOSIS — M48.062 SPINAL STENOSIS OF LUMBAR REGION WITH NEUROGENIC CLAUDICATION: ICD-10-CM

## 2022-11-08 DIAGNOSIS — M16.0 PRIMARY OSTEOARTHRITIS OF BOTH HIPS: ICD-10-CM

## 2022-11-08 DIAGNOSIS — F33.41 RECURRENT MAJOR DEPRESSIVE DISORDER, IN PARTIAL REMISSION (HCC): ICD-10-CM

## 2022-11-08 DIAGNOSIS — G89.29 CHRONIC LEFT-SIDED LOW BACK PAIN WITH LEFT-SIDED SCIATICA: Primary | ICD-10-CM

## 2022-11-08 DIAGNOSIS — M54.42 CHRONIC LEFT-SIDED LOW BACK PAIN WITH LEFT-SIDED SCIATICA: Primary | ICD-10-CM

## 2022-11-08 PROCEDURE — 1036F TOBACCO NON-USER: CPT | Performed by: FAMILY MEDICINE

## 2022-11-08 PROCEDURE — 1090F PRES/ABSN URINE INCON ASSESS: CPT | Performed by: FAMILY MEDICINE

## 2022-11-08 PROCEDURE — G8400 PT W/DXA NO RESULTS DOC: HCPCS | Performed by: FAMILY MEDICINE

## 2022-11-08 PROCEDURE — 99214 OFFICE O/P EST MOD 30 MIN: CPT | Performed by: FAMILY MEDICINE

## 2022-11-08 PROCEDURE — G8427 DOCREV CUR MEDS BY ELIG CLIN: HCPCS | Performed by: FAMILY MEDICINE

## 2022-11-08 PROCEDURE — 1123F ACP DISCUSS/DSCN MKR DOCD: CPT | Performed by: FAMILY MEDICINE

## 2022-11-08 PROCEDURE — G8484 FLU IMMUNIZE NO ADMIN: HCPCS | Performed by: FAMILY MEDICINE

## 2022-11-08 PROCEDURE — G8417 CALC BMI ABV UP PARAM F/U: HCPCS | Performed by: FAMILY MEDICINE

## 2022-11-08 RX ORDER — OXYCODONE AND ACETAMINOPHEN 10; 325 MG/1; MG/1
1 TABLET ORAL EVERY 6 HOURS PRN
Qty: 100 TABLET | Refills: 0 | Status: SHIPPED | OUTPATIENT
Start: 2022-11-14 | End: 2022-12-14

## 2022-11-08 ASSESSMENT — PATIENT HEALTH QUESTIONNAIRE - PHQ9
SUM OF ALL RESPONSES TO PHQ QUESTIONS 1-9: 1
SUM OF ALL RESPONSES TO PHQ QUESTIONS 1-9: 1
7. TROUBLE CONCENTRATING ON THINGS, SUCH AS READING THE NEWSPAPER OR WATCHING TELEVISION: 0
3. TROUBLE FALLING OR STAYING ASLEEP: 0
SUM OF ALL RESPONSES TO PHQ QUESTIONS 1-9: 1
10. IF YOU CHECKED OFF ANY PROBLEMS, HOW DIFFICULT HAVE THESE PROBLEMS MADE IT FOR YOU TO DO YOUR WORK, TAKE CARE OF THINGS AT HOME, OR GET ALONG WITH OTHER PEOPLE: 0
9. THOUGHTS THAT YOU WOULD BE BETTER OFF DEAD, OR OF HURTING YOURSELF: 0
5. POOR APPETITE OR OVEREATING: 1
6. FEELING BAD ABOUT YOURSELF - OR THAT YOU ARE A FAILURE OR HAVE LET YOURSELF OR YOUR FAMILY DOWN: 0
1. LITTLE INTEREST OR PLEASURE IN DOING THINGS: 0
2. FEELING DOWN, DEPRESSED OR HOPELESS: 0
SUM OF ALL RESPONSES TO PHQ9 QUESTIONS 1 & 2: 0
4. FEELING TIRED OR HAVING LITTLE ENERGY: 0
SUM OF ALL RESPONSES TO PHQ QUESTIONS 1-9: 1
8. MOVING OR SPEAKING SO SLOWLY THAT OTHER PEOPLE COULD HAVE NOTICED. OR THE OPPOSITE, BEING SO FIGETY OR RESTLESS THAT YOU HAVE BEEN MOVING AROUND A LOT MORE THAN USUAL: 0

## 2022-11-08 ASSESSMENT — ENCOUNTER SYMPTOMS
DIARRHEA: 0
CONSTIPATION: 0

## 2022-11-08 NOTE — ASSESSMENT & PLAN NOTE
Her depression is very very much improved largely due to relief of the pain. Plan to continue sertraline.

## 2022-11-08 NOTE — ASSESSMENT & PLAN NOTE
Plan to continue Percocet 10 mg tablets 3 times a day on most days for pain relief and occasionally 4 times a day. It has made a big improvement in her functionality and life.

## 2022-11-08 NOTE — ASSESSMENT & PLAN NOTE
I believe the benefits of the Percocet are exceeding the risks. She is much happier and able to do much more around her house.

## 2022-11-08 NOTE — PROGRESS NOTES
22    Paresh Fisher  1945    MARGARITO Gupta is a 68 y.o. female who presents today for evaluation of:  Chief Complaint   Patient presents with    Follow-up     Back pain     OA : The Percocet 10 mg has really helped. She is ablt to get outside and walk. She has not had any close calls with falling or fatigue or confusion. Weight : she is trying to lsoe weight. Mood   PHQ Scores 2022 12/10/2021   PHQ2 Score 0 6   PHQ9 Score 1 23     Interpretation of Total Score Depression Severity: 1-4 = Minimal depression, 5-9 = Mild depression, 10-14 = Moderate depression, 15-19 = Moderately severe depression, 20-27 = Severe depression  Mood is much better. 2 bad marriages. 3rd husb has . Review of Systems   Cardiovascular:  Negative for chest pain and palpitations. Gastrointestinal:  Negative for constipation and diarrhea. Allergies   Allergen Reactions    Latex     Prednisone Hives        OBJECTIVE    /81   Pulse 60   Temp 97.3 °F (36.3 °C) (Infrared)   Ht 5' 1\" (1.549 m)   Wt 215 lb 12.8 oz (97.9 kg)   SpO2 97%   BMI 40.78 kg/m²     Physical Exam   Constitutional:       General: Not in acute distress. Appearance: Normal appearance. Not ill-appearing. Obese. Eyes:      General: No scleral icterus. Cardiovascular:      Rate and Rhythm: Normal rate and regular rhythm. Heart sounds: No murmur heard. No friction rub. No gallop. Pulmonary:      Effort: Pulmonary effort is normal. No respiratory distress. Breath sounds: No wheezing, rhonchi or rales. Abdominal:      Palpations: Abdomen is soft. There is no mass. Tenderness: There is no abdominal tenderness. Musculoskeletal:     Moves all extremities normally. No L or R grtr troch ttp. Skin:     General: Skin is warm. Coloration: Skin is not jaundiced. Neurological:      Mental Status: Patient is alert. Psychiatric:         Behavior: Behavior normal.         Thought Content:  Thought content normal.         Judgment: Judgment normal.    Reviewed:  PDMP shows #100 oxycodone 10/325 on 8/18/22, 9/17/22, & 10/15/22 (2 d early). ASSESSMENT/PLAN:    1. Back Pain, Chronic left-sided low back pain with left-sided sciatica  -     oxyCODONE-acetaminophen (PERCOCET)  MG per tablet; Take 1 tablet by mouth every 6 hours as needed for Pain for up to 30 days. Intended supply: 30 days Limit to three tabs per day on most days. , Disp-100 tablet, R-0Normal  2. Spinal stenosis of lumbar region with neurogenic claudication  Assessment & Plan:   I believe the benefits of the Percocet are exceeding the risks. She is much happier and able to do much more around her house. Orders:  -     oxyCODONE-acetaminophen (PERCOCET)  MG per tablet; Take 1 tablet by mouth every 6 hours as needed for Pain for up to 30 days. Intended supply: 30 days Limit to three tabs per day on most days. , Disp-100 tablet, R-0Normal  3. Recurrent major depressive disorder, in partial remission [facility])  Assessment & Plan:   Her depression is very very much improved largely due to relief of the pain. Plan to continue sertraline. 4. OA, Hips, Primary osteoarthritis of both hips  Assessment & Plan:   Plan to continue Percocet 10 mg tablets 3 times a day on most days for pain relief and occasionally 4 times a day. It has made a big improvement in her functionality and life. Counseling provided for:  Healthy eating - Avoid sugar and other refined carbohydrates. >> Exercise - 1> try to do 150 minutes a week of exercise that is as hard as walking briskly (30 minutes 5 days a week or 22 minutes every day); and 2> do some strength training 2 or 3 times a week. Return in about 5 weeks (around 12/13/2022).    Kam Murray MD

## 2022-12-13 ENCOUNTER — OFFICE VISIT (OUTPATIENT)
Dept: FAMILY MEDICINE CLINIC | Age: 77
End: 2022-12-13
Payer: MEDICARE

## 2022-12-13 VITALS
TEMPERATURE: 96.7 F | HEART RATE: 57 BPM | BODY MASS INDEX: 41.65 KG/M2 | OXYGEN SATURATION: 94 % | SYSTOLIC BLOOD PRESSURE: 132 MMHG | DIASTOLIC BLOOD PRESSURE: 80 MMHG | WEIGHT: 220.6 LBS | HEIGHT: 61 IN

## 2022-12-13 DIAGNOSIS — Z00.00 MEDICARE ANNUAL WELLNESS VISIT, SUBSEQUENT: Primary | ICD-10-CM

## 2022-12-13 DIAGNOSIS — M54.42 CHRONIC LEFT-SIDED LOW BACK PAIN WITH LEFT-SIDED SCIATICA: ICD-10-CM

## 2022-12-13 DIAGNOSIS — M16.0 PRIMARY OSTEOARTHRITIS OF BOTH HIPS: ICD-10-CM

## 2022-12-13 DIAGNOSIS — M48.062 SPINAL STENOSIS OF LUMBAR REGION WITH NEUROGENIC CLAUDICATION: ICD-10-CM

## 2022-12-13 DIAGNOSIS — G89.29 CHRONIC LEFT-SIDED LOW BACK PAIN WITH LEFT-SIDED SCIATICA: ICD-10-CM

## 2022-12-13 DIAGNOSIS — G45.9 TIA (TRANSIENT ISCHEMIC ATTACK): ICD-10-CM

## 2022-12-13 PROCEDURE — G0439 PPPS, SUBSEQ VISIT: HCPCS | Performed by: FAMILY MEDICINE

## 2022-12-13 PROCEDURE — G8417 CALC BMI ABV UP PARAM F/U: HCPCS | Performed by: FAMILY MEDICINE

## 2022-12-13 PROCEDURE — 1090F PRES/ABSN URINE INCON ASSESS: CPT | Performed by: FAMILY MEDICINE

## 2022-12-13 PROCEDURE — G8484 FLU IMMUNIZE NO ADMIN: HCPCS | Performed by: FAMILY MEDICINE

## 2022-12-13 PROCEDURE — 1036F TOBACCO NON-USER: CPT | Performed by: FAMILY MEDICINE

## 2022-12-13 PROCEDURE — G8400 PT W/DXA NO RESULTS DOC: HCPCS | Performed by: FAMILY MEDICINE

## 2022-12-13 PROCEDURE — 99213 OFFICE O/P EST LOW 20 MIN: CPT | Performed by: FAMILY MEDICINE

## 2022-12-13 PROCEDURE — 1123F ACP DISCUSS/DSCN MKR DOCD: CPT | Performed by: FAMILY MEDICINE

## 2022-12-13 PROCEDURE — G8427 DOCREV CUR MEDS BY ELIG CLIN: HCPCS | Performed by: FAMILY MEDICINE

## 2022-12-13 RX ORDER — OXYCODONE AND ACETAMINOPHEN 10; 325 MG/1; MG/1
1 TABLET ORAL EVERY 6 HOURS PRN
Qty: 100 TABLET | Refills: 0 | Status: SHIPPED | OUTPATIENT
Start: 2022-12-13 | End: 2023-01-14

## 2022-12-13 ASSESSMENT — ENCOUNTER SYMPTOMS: CONSTIPATION: 0

## 2022-12-13 ASSESSMENT — PATIENT HEALTH QUESTIONNAIRE - PHQ9
SUM OF ALL RESPONSES TO PHQ QUESTIONS 1-9: 6
SUM OF ALL RESPONSES TO PHQ QUESTIONS 1-9: 6
2. FEELING DOWN, DEPRESSED OR HOPELESS: 1
9. THOUGHTS THAT YOU WOULD BE BETTER OFF DEAD, OR OF HURTING YOURSELF: 0
1. LITTLE INTEREST OR PLEASURE IN DOING THINGS: 3
8. MOVING OR SPEAKING SO SLOWLY THAT OTHER PEOPLE COULD HAVE NOTICED. OR THE OPPOSITE, BEING SO FIGETY OR RESTLESS THAT YOU HAVE BEEN MOVING AROUND A LOT MORE THAN USUAL: 1
4. FEELING TIRED OR HAVING LITTLE ENERGY: 1
SUM OF ALL RESPONSES TO PHQ QUESTIONS 1-9: 6
5. POOR APPETITE OR OVEREATING: 0
7. TROUBLE CONCENTRATING ON THINGS, SUCH AS READING THE NEWSPAPER OR WATCHING TELEVISION: 0
6. FEELING BAD ABOUT YOURSELF - OR THAT YOU ARE A FAILURE OR HAVE LET YOURSELF OR YOUR FAMILY DOWN: 0
3. TROUBLE FALLING OR STAYING ASLEEP: 0
SUM OF ALL RESPONSES TO PHQ QUESTIONS 1-9: 6
SUM OF ALL RESPONSES TO PHQ9 QUESTIONS 1 & 2: 4

## 2022-12-13 ASSESSMENT — LIFESTYLE VARIABLES
HOW OFTEN DO YOU HAVE A DRINK CONTAINING ALCOHOL: NEVER
HOW MANY STANDARD DRINKS CONTAINING ALCOHOL DO YOU HAVE ON A TYPICAL DAY: PATIENT DOES NOT DRINK

## 2022-12-13 ASSESSMENT — VISUAL ACUITY
OS_CC: 20/50
OD_CC: 20/40

## 2022-12-13 NOTE — PATIENT INSTRUCTIONS
Preventing Falls: Care Instructions  Overview     Getting around your home safely can be a challenge if you have injuries or health problems that make it easy for you to fall. Loose rugs and furniture in walkways are among the dangers for many older people who have problems walking or who have poor eyesight. People who have conditions such as arthritis, osteoporosis, or dementia also have to be careful not to fall. You can make your home safer with a few simple measures. Follow-up care is a key part of your treatment and safety. Be sure to make and go to all appointments, and call your doctor if you are having problems. It's also a good idea to know your test results and keep a list of the medicines you take. How can you care for yourself at home? Taking care of yourself  Exercise regularly to improve your strength, muscle tone, and balance. Walk if you can. Swimming may be a good choice if you cannot walk easily. Have your vision and hearing checked each year or any time you notice a change. If you have trouble seeing and hearing, you might not be able to avoid objects and could lose your balance. Know the side effects of the medicines you take. Ask your doctor or pharmacist whether the medicines you take can affect your balance. Sleeping pills or sedatives can affect your balance. Limit the amount of alcohol you drink. Alcohol can impair your balance and other senses. Ask your doctor whether calluses or corns on your feet need to be removed. If you wear loose-fitting shoes because of calluses or corns, you can lose your balance and fall. Talk to your doctor if you have numbness in your feet. You may get dizzy if you do not drink enough water. To prevent dehydration, drink plenty of fluids. Choose water and other clear liquids. If you have kidney, heart, or liver disease and have to limit fluids, talk with your doctor before you increase the amount of fluids you drink.   Preventing falls at home  Remove raised doorway thresholds, throw rugs, and clutter. Repair loose carpet or raised areas in the floor. Move furniture and electrical cords to keep them out of walking paths. Use nonskid floor wax, and wipe up spills right away, especially on ceramic tile floors. If you use a walker or cane, put rubber tips on it. If you use crutches, clean the bottoms of them regularly with an abrasive pad, such as steel wool. Keep your house well lit, especially stairways, porches, and outside walkways. Use night-lights in areas such as hallways and bathrooms. Add extra light switches or use remote switches (such as switches that go on or off when you clap your hands) to make it easier to turn lights on if you have to get up during the night. Install sturdy handrails on stairways. Move items in your cabinets so that the things you use a lot are on the lower shelves (about waist level). Keep a cordless phone and a flashlight with new batteries by your bed. If possible, put a phone in each of the main rooms of your house, or carry a cell phone in case you fall and cannot reach a phone. Or, you can wear a device around your neck or wrist. You push a button that sends a signal for help. Wear low-heeled shoes that fit well and give your feet good support. Use footwear with nonskid soles. Check the heels and soles of your shoes for wear. Repair or replace worn heels or soles. Do not wear socks without shoes on smooth floors, such as wood. Walk on the grass when the sidewalks are slippery. If you live in an area that gets snow and ice in the winter, sprinkle salt on slippery steps and sidewalks. Or ask a family member or friend to do this for you. Preventing falls in the bath  Install grab bars and nonskid mats inside and outside your shower or tub and near the toilet and sinks. Use shower chairs and bath benches. Use a hand-held shower head that will allow you to sit while showering.   Get into a tub or shower by putting the weaker leg in first. Get out of a tub or shower with your strong side first.  Repair loose toilet seats and consider installing a raised toilet seat to make getting on and off the toilet easier. Keep your bathroom door unlocked while you are in the shower. Where can you learn more? Go to http://www.selby.com/ and enter G117 to learn more about \"Preventing Falls: Care Instructions. \"  Current as of: May 4, 2022               Content Version: 13.5  © 9811-8543 [employer]. Care instructions adapted under license by [facility][note 3] [address] If you have questions about a medical condition or this instruction, always ask your healthcare professional. [address] any warranty or liability for your use of this information. Learning About Mindfulness for Stress  What are mindfulness and stress? Stress is what you feel when you have to handle more than you are used to. A lot of things can cause stress. You may feel stress when you go on a job interview, take a test, or run a race. This kind of short-term stress is normal and even useful. It can help you if you need to work hard or react quickly. Stress also can last a long time. Long-term stress is caused by stressful situations or events. Examples of long-term stress include long-term health problems, ongoing problems at work, and conflicts in your family. Long-term stress can harm your health. Mindfulness is a focus only on things happening in the present moment. It's a process of purposefully paying attention to and being aware of your surroundings, your emotions, your thoughts, and how your body feels. You are aware of these things, but you aren't judging these experiences as \"good\" or \"bad. \" Mindfulness can help you learn to calm your mind and body to help you cope with illness, pain, and stress. How does mindfulness help to relieve stress? Mindfulness can help quiet your mind and relax your body. Studies show that it can help some people sleep better, feel less anxious, and bring their blood pressure down. And it's been shown to help some people live and cope better with certain health problems like heart disease, depression, chronic pain, and cancer. How do you practice mindfulness? To be mindful is to pay attention, to be present, and to be accepting. When you're mindful, you do just one thing and you pay close attention to that one thing. For example, you may sit quietly and notice your emotions or how your food tastes and smells. When you're present, you focus on the things that are happening right now. You let go of your thoughts about the past and the future. When you dwell on the past or the future, you miss moments that can heal and strengthen you. You may miss moments like hearing a child laugh or seeing a friendly face when you think you're all alone. When you're accepting, you don't  the present moment. Instead you accept your thoughts and feelings as they come. You can practice anytime, anywhere, and in any way you choose. You can practice in many ways. Here are a few ideas:  While doing your chores, like washing the dishes, let your mind focus on what's in your hand. What does the dish feel like? Is the water warm or cold? Go outside and take a few deep breaths. What is the air like? Is it warm or cold? When you can, take some time at the start of your day to sit alone and think. Take a slow walk by yourself. Count your steps while you breathe in and out. Try yoga breathing exercises, stretches, and poses to strengthen and relax your muscles. At work, if you can, try to stop for a few moments each hour. Note how your body feels. Let yourself regroup and let your mind settle before you return to what you were doing. If you struggle with anxiety or \"worry thoughts,\" imagine your mind as a blue mart and your worry thoughts as clouds.  Now imagine those worry thoughts floating across your mind's mart. Just let them pass by as you watch. Follow-up care is a key part of your treatment and safety. Be sure to make and go to all appointments, and call your doctor if you are having problems. It's also a good idea to know your test results and keep a list of the medicines you take. Where can you learn more? Go to http://www.selby.com/ and enter M676 to learn more about \"Learning About Mindfulness for Stress. \"  Current as of: February 9, 2022               Content Version: 13.5  © 4794-6467 [employer]. Care instructions adapted under license by [facility] ([facility]). If you have questions about a medical condition or this instruction, always ask your healthcare professional. [address] any warranty or liability for your use of this information. Learning About Emotional Support  When do you need emotional support? You might find getting support from others helpful when you have a long-term health problem. Often people feel alone, confused, or scared when coping with an illness. But you aren't alone. Other people are going through the same thing you are and know how you feel. Talking with others about your feelings can help you feel better. Your family and friends can give you support. So can your doctor, a support group, or a Zoroastrianism. If you have a support network, you will not feel as alone. You will learn new ways to deal with your situation, and you may try harder to overcome it. Where you can get support  Family and friends: They can help you cope by giving you comfort and encouragement. Counseling: Professional counseling can help you cope with situations that interfere with your life and cause stress. Counseling can help you understand and deal with your illness. Your doctor: Find a doctor you trust and feel comfortable with. Be open and honest about your fears and concerns.  Your doctor can help you get the right medical treatments, including counseling. Spiritual or Adventism groups: They can provide comfort and may be able to help you find counseling or other social support services. Social groups: They can help you meet new people and get involved in activities you enjoy. Community support groups: In a support group, you can talk to others who have dealt with the same problems or illness as you. You can encourage one another and learn ways to cope with tough emotions. How to find a support group  Ask your doctor, counselor, or other health professional for suggestions. Contact your local Episcopalian, Yarsani, Zoroastrianism, or other Adventism group. Ask your family and friends. Ask people who have the same health concerns. Go online. Forums and blogs let you read messages from others and leave your own messages. You can exchange stories, vent your frustrations, and ask and answer questions. Contact a city, state, or national group that provides support for your health concerns. Your library or community center may have a list of these groups. Or you can look for information online. Look for a support group that works for you. Ask yourself if you prefer structure and would like a , or if you would like a less formal group. Do you prefer face-to-face meetings? Or do you feel more secure in online chat rooms or forums? Supportive relationships  A supportive relationship includes emotional support such as love, trust, and understanding, as well as advice and concrete help, such as help managing your time. Reach out to others  Family and friends can help you. Ask them to:  Listen to you and give you encouragement. This can keep you from feeling hopeless or alone. Help with small daily tasks or with bigger problems. A helping hand can keep you from feeling overwhelmed. Help you manage a health problem. For example, ask them to go to doctor visits with you.  Your loved ones can offer support by being involved in your medical care.  Respect your relationships  A good relationship is also a two-way street. You count on help from others, but they also count on you. Know your friends' limits. You don't have to see or call your friends every day. If you are going through a rough patch, ask friends if you can contact them outside of the usual boundaries. Don't always complain or talk about yourself. Know when it's time to stop talking and listen or just enjoy your friend's company. Know that good friends can be a bad influence. For example, if a friend encourages you to drink when you know it will harm you, you may want to end the friendship. Where can you learn more? Go to http://www.woods.com/ and enter G092 to learn more about \"Learning About Emotional Support. \"  Current as of: February 9, 2022               Content Version: 13.5  © 2006-2022 [employer]. Care instructions adapted under license by [facility] ([facility]). If you have questions about a medical condition or this instruction, always ask your healthcare professional. [place] [address] any warranty or liability for your use of this information. Learning About Dental Care for Older Adults  Dental care for older adults: Overview  Dental care for older people is much the same as for younger adults. But older adults do have concerns that younger adults do not. Older adults may have problems with gum disease and decay on the roots of their teeth. They may need missing teeth replaced or broken fillings fixed. Or they may have dentures that need to be cared for. Some older adults may have trouble holding a toothbrush. You can help remind the person you are caring for to brush and floss their teeth or to clean their dentures. In some cases, you may need to do the brushing and other dental care tasks. People who have trouble using their hands or who have dementia may need this extra help. How can you help with dental care?   Normal dental care  To keep the teeth and gums healthy:  Brush the teeth with fluoride toothpaste twice a day--in the morning and at night--and floss at least once a day. Plaque can quickly build up on the teeth of older adults. Watch for the signs of gum disease. These signs include gums that bleed after brushing or after eating hard foods, such as apples. See a dentist regularly. Many experts recommend checkups every 6 months. Keep the dentist up to date on any new medications the person is taking. Encourage a balanced diet that includes whole grains, vegetables, and fruits, and that is low in saturated fat and sodium. Encourage the person you're caring for not to use tobacco products. They can affect dental and general health. Many older adults have a fixed income and feel that they can't afford dental care. But most [facility] and [facility] have programs in which dentists help older adults by lowering fees. Contact your area's public health offices or  for information about dental care in your area. Using a toothbrush  Older adults with arthritis sometimes have trouble brushing their teeth because they can't easily hold the toothbrush. Their hands and fingers may be stiff, painful, or weak. If this is the case, you can: Offer an electric toothbrush. Enlarge the handle of a non-electric toothbrush by wrapping a sponge, an elastic bandage, or adhesive tape around it. Push the toothbrush handle through a ball made of rubber or soft foam.  Make the handle longer and thicker by taping Popsicle sticks or tongue depressors to it. You may also be able to buy special toothbrushes, toothpaste dispensers, and floss holders. Your doctor may recommend a soft-bristle toothbrush if the person you care for bleeds easily. Bleeding can happen because of a health problem or from certain medicines. A toothpaste for sensitive teeth may help if the person you care for has sensitive teeth.   How do you brush and floss someone's teeth? If the person you are caring for has a hard time cleaning their teeth on their own, you may need to brush and floss their teeth for them. It may be easiest to have the person sit and face away from you, and to sit or stand behind them. That way you can steady their head against your arm as you reach around to floss and brush their teeth. Choose a place that has good lighting and is comfortable for both of you. Before you begin, gather your supplies. You will need gloves, floss, a toothbrush, and a container to hold water if you are not near a sink. Wash and dry your hands well and put on gloves. Start by flossing:  Gently work a piece of floss between each of the teeth toward the gums. A plastic flossing tool may make this easier, and they are available at most [facility]. Curve the floss around each tooth into a U-shape and gently slide it under the gum line. Move the floss firmly up and down several times to scrape off the plaque. After you've finished flossing, throw away the used floss and begin brushing:  Wet the brush and apply toothpaste. Place the brush at a 45-degree angle where the teeth meet the gums. Press firmly, and move the brush in small circles over the surface of the teeth. Be careful not to brush too hard. Vigorous brushing can make the gums pull away from the teeth and can scratch the tooth enamel. Brush all surfaces of the teeth, on the tongue side and on the cheek side. Pay special attention to the front teeth and all surfaces of the back teeth. Brush chewing surfaces with short back-and-forth strokes. After you've finished, help the person rinse the remaining toothpaste from their mouth. Where can you learn more? Go to http://www.woods.com/ and enter F944 to learn more about \"Learning About Dental Care for Older Adults. \"  Current as of: June 16, 2022               Content Version: 13.5  © 0880-6344 [employer].    Care instructions adapted under license by [facility] [address] If you have questions about a medical condition or this instruction, always ask your healthcare professional. [place] [address] any warranty or liability for your use of this information. Hearing Loss: Care Instructions  Overview     Hearing loss is a sudden or slow decrease in how well you hear. It can range from mild to severe. Permanent hearing loss can occur with aging. It also can happen when you are exposed long-term to loud noise. Examples include listening to loud music, riding motorcycles, or being around other loud machines. Hearing loss can affect your work and home life. It can make you feel lonely or depressed. You may feel that you have lost your independence. But hearing aids and other devices can help you hear better and feel connected to others. Follow-up care is a key part of your treatment and safety. Be sure to make and go to all appointments, and call your doctor if you are having problems. It's also a good idea to know your test results and keep a list of the medicines you take. How can you care for yourself at home? Avoid loud noises whenever possible. This helps keep your hearing from getting worse. Always wear hearing protection around loud noises. Wear a hearing aid as directed. See a professional who can help you pick a hearing aid that fits you. Have hearing tests as your doctor suggests. They can show whether your hearing has changed. Your hearing aid may need to be adjusted. Use other devices as needed. These may include:  Telephone amplifiers and hearing aids that can connect to a television, stereo, radio, or microphone. Devices that use lights or vibrations. These alert you to the doorbell, a ringing telephone, or a baby monitor. Television closed-captioning. This shows the words at the bottom of the screen. Most new TVs can do this. TTY (text telephone).  This lets you type messages back and forth on the telephone instead of talking or listening. These devices are also called TDD. When messages are typed on the keyboard, they are sent over the phone line to a receiving TTY. The message is shown on a monitor. Use text messaging, social media, and email if it is hard for you to communicate by telephone. Try to learn a listening technique called speechreading. It is not lipreading. You pay attention to people's gestures, expressions, posture, and tone of voice. These clues can help you understand what a person is saying. Face the person you are talking to, and have them face you. Make sure the lighting is good. You need to see the other person's face clearly. Think about counseling if you need help to adjust to your hearing loss. When should you call for help? Watch closely for changes in your health, and be sure to contact your doctor if:    You think your hearing is getting worse.     You have new symptoms, such as dizziness or nausea. Where can you learn more? Go to http://www.selby.com/ and enter R798 to learn more about \"Hearing Loss: Care Instructions. \"  Current as of: May 4, 2022               Content Version: 13.5  © 5705-9959 [employer]. Care instructions adapted under license by [facility] ([facility]). If you have questions about a medical condition or this instruction, always ask your healthcare professional. [address] any warranty or liability for your use of this information. Advance Directives: Care Instructions  Overview  An advance directive is a legal way to state your wishes at the end of your life. It tells your family and your doctor what to do if you can't say what you want. There are two main types of advance directives. You can change them any time your wishes change. Living will. This form tells your family and your doctor your wishes about life support and other treatment. The form is also called a declaration.   Medical power of . This form lets you name a person to make treatment decisions for you when you can't speak for yourself. This person is called a health care agent (health care proxy, health care surrogate). The form is also called a durable power of  for health care. If you do not have an advance directive, decisions about your medical care may be made by a family member, or by a doctor or a  who doesn't know you. It may help to think of an advance directive as a gift to the people who care for you. If you have one, they won't have to make tough decisions by themselves. For more information, including forms for your state, see the [address] website (www.caringinfo.org/planning/advance-directives/). Follow-up care is a key part of your treatment and safety. Be sure to make and go to all appointments, and call your doctor if you are having problems. It's also a good idea to know your test results and keep a list of the medicines you take. What should you include in an advance directive? Many states have a unique advance directive form. (It may ask you to address specific issues.) Or you might use a universal form that's approved by many states. If your form doesn't tell you what to address, it may be hard to know what to include in your advance directive. Use the questions below to help you get started. Who do you want to make decisions about your medical care if you are not able to? What life-support measures do you want if you have a serious illness that gets worse over time or can't be cured? What are you most afraid of that might happen? (Maybe you're afraid of having pain, losing your independence, or being kept alive by machines.)  Where would you prefer to die? (Your home? A hospital? A nursing home?)  Do you want to donate your organs when you die? Do you want certain [employer] practices performed before you die? When should you call for help?   Be sure to contact your doctor if you have any diet alone, but a vitamin D supplement is usually necessary to meet this goal.  When exposed to the sun, use a sunscreen that protects against both UVA and UVB radiation with an SPF of 30 or greater. Reapply every 2 to 3 hours or after sweating, drying off with a towel, or swimming. Always wear a seat belt when traveling in a car. Always wear a helmet when riding a bicycle or motorcycle.

## 2022-12-13 NOTE — PROGRESS NOTES
12/13/22    Keren Ma  1945    SUBJECTIVE    HPI - Bruno Duarte is a 68 y.o. female who presents today for evaluation of:  Chief Complaint   Patient presents with    Follow-up     Pain     Medication Refill    Medicare AW     Back Pain and hip pain : the 10mg Percocet has really helped. She is doing a lot of walking and usually can do so without a cane. It is easier to get in and out of bathtub. No close calls with falling. Depressed since living with family with a 2 yr old. Review of Systems   Gastrointestinal:  Negative for constipation. Neurological:  Negative for dizziness and headaches. Psychiatric/Behavioral:  Positive for dysphoric mood. Negative for self-injury and suicidal ideas. Allergies   Allergen Reactions    Latex     Prednisone Hives        OBJECTIVE    /80 (Site: Left Upper Arm, Position: Sitting, Cuff Size: Large Adult)   Pulse 57   Temp (!) 96.7 °F (35.9 °C) (Infrared)   Ht 5' 1\" (1.549 m)   Wt 220 lb 9.6 oz (100.1 kg)   SpO2 94%   BMI 41.68 kg/m²     Physical Exam   Constitutional:       General: Not in acute distress. Appearance: Normal appearance. Not ill-appearing. Eyes:      General: No scleral icterus. Cardiovascular:      Rate and Rhythm: Normal rate and regular rhythm. Heart sounds: No murmur heard. No friction rub. No gallop. Pulmonary:      Effort: Pulmonary effort is normal. No respiratory distress. Breath sounds: No wheezing, rhonchi or rales. Abdominal:      Palpations: Abdomen is soft. There is no mass. Tenderness: There is no abdominal tenderness. Musculoskeletal:     Moves all extremities normally. Skin:     General: Skin is warm. Coloration: Skin is not jaundiced. Neurological:      Mental Status: Patient is alert. Neg marco a slr. Psychiatric:         Behavior: Behavior normal.         Thought Content:  Thought content normal.         Judgment: Judgment normal.    Reviewed:  PDMP shows oxycodone 10/325 #100 on 11/15/22 & on 10/15/22. ASSESSMENT/PLAN:    1. Medicare annual wellness visit, subsequent  2. TIA (transient ischemic attack)  Assessment & Plan:   Years ago and no recurrences so does not want a lipid med. 3. Back Pain, Chronic left-sided low back pain with left-sided sciatica  Assessment & Plan:   She has stable chronic back pain and oxycodone 10 mg 3 times a day and on some occasions 4 times a day enables her to be active and move around more. She does not feel she is at risk of falling. I will prescribe a refill. Orders:  -     oxyCODONE-acetaminophen (PERCOCET)  MG per tablet; Take 1 tablet by mouth every 6 hours as needed for Pain for up to 32 days. Intended supply: 30 days Limit to three tabs per day on most days. , Disp-100 tablet, R-0Normal  4. Spinal stenosis of lumbar region with neurogenic claudication  Assessment & Plan:   See documentation under back pain above. Orders:  -     oxyCODONE-acetaminophen (PERCOCET)  MG per tablet; Take 1 tablet by mouth every 6 hours as needed for Pain for up to 32 days. Intended supply: 30 days Limit to three tabs per day on most days. , Disp-100 tablet, R-0Normal  5. OA, Hips, Primary osteoarthritis of both hips  Assessment & Plan:   See documentation for oxycodone under back pain above. Orders:  -     oxyCODONE-acetaminophen (PERCOCET)  MG per tablet; Take 1 tablet by mouth every 6 hours as needed for Pain for up to 32 days. Intended supply: 30 days Limit to three tabs per day on most days. , Disp-100 tablet, R-0Normal        Counseling provided for:  Healthy eating - Avoid sugar and other refined carbohydrates. and Eat more healthy unsaturated fats (runny at room temperature like oils and nut oils and fish oils and avocados). >> Exercise - 1> try to do 150 minutes a week of exercise that is as hard as walking briskly (30 minutes 5 days a week or 22 minutes every day); and 2> do some strength training 2 or 3 times a week.   Social support - Keep socially involved. This will help with keeping your brain working well (avoiding dementia) as you get older and also help you to be happier. and Mentally activity - Keep trying to learn new things, reading, following sports/fashion/whatever you like, and other mental things to keep your brain working well and avoid dementia. Return in 4 weeks (on 1/10/2023) for Pain management. Maurizio De La Cruz MD     Medicare Annual Wellness Visit    [facility] is here for Follow-up (Pain ), Medication Refill, and Medicare AWV    Assessment & Plan   Medicare annual wellness visit, subsequent  TIA (transient ischemic attack)  Assessment & Plan:   Years ago and no recurrences so does not want a lipid med. Back Pain, Chronic left-sided low back pain with left-sided sciatica  Assessment & Plan:   She has stable chronic back pain and oxycodone 10 mg 3 times a day and on some occasions 4 times a day enables her to be active and move around more. She does not feel she is at risk of falling. I will prescribe a refill. Orders:  -     oxyCODONE-acetaminophen (PERCOCET)  MG per tablet; Take 1 tablet by mouth every 6 hours as needed for Pain for up to 32 days. Intended supply: 30 days Limit to three tabs per day on most days. , Disp-100 tablet, R-0Normal  Spinal stenosis of lumbar region with neurogenic claudication  Assessment & Plan:   See documentation under back pain above. Orders:  -     oxyCODONE-acetaminophen (PERCOCET)  MG per tablet; Take 1 tablet by mouth every 6 hours as needed for Pain for up to 32 days. Intended supply: 30 days Limit to three tabs per day on most days. , Disp-100 tablet, R-0Normal  OA, Hips, Primary osteoarthritis of both hips  Assessment & Plan:   See documentation for oxycodone under back pain above. Orders:  -     oxyCODONE-acetaminophen (PERCOCET)  MG per tablet; Take 1 tablet by mouth every 6 hours as needed for Pain for up to 32 days.  Intended supply: 30 days Limit to three tabs per day on most days. , Disp-100 tablet, R-0Normal      Recommendations for Preventive Services Due: see orders and patient instructions/AVS.  Recommended screening schedule for the next 5-10 years is provided to the patient in written form: see Patient Instructions/AVS.     Return in 4 weeks (on 1/10/2023) for Pain management. Subjective       Patient's complete Health Risk Assessment and screening values have been reviewed and are found in Flowsheets. The following problems were reviewed today and where indicated follow up appointments were made and/or referrals ordered. Positive Risk Factor Screenings with Interventions:    Fall Risk:  Do you feel unsteady or are you worried about falling? : (!) yes  2 or more falls in past year?: no  Fall with injury in past year?: no     Interventions:    Do leg exercises. Jerry Chi also does. See AVS for additional education material  See A/P for plan and any pertinent orders     Depression:  PHQ-2 Score: 4  PHQ-9 Total Score: 6    Interpretation:   1-4 = minimal  5-9 = mild  10-14 = moderate  15-19 = moderately severe  20-27 = severe  Interventions:  Encouraged exercise. See AVS for additional education material  See A/P for any pertinent orders           Opioid Risk: (Low risk score <55) Opioid risk score: 31    Patient is low risk for opioid use disorder or overdose. Last PDMP Osmar Calvillo as Reviewed:  Review User Review Instant Review Result   Mckenna Console 12/13/2022  6:45 AM     Reviewed PDMP [1]     Last Controlled Substance Monitoring Documentation      [facility] Office Visit from 6/22/2022 in [facility]   Periodic Controlled Substance Monitoring Possible medication side effects, risk of tolerance/dependence & alternative treatments discussed., Obtaining appropriate analgesic effect of treatment.  filed at 06/22/2022 6835                Weight and Activity:  Physical Activity: Insufficiently Active    Days of Exercise per Week: 7 days    Minutes of Exercise per Session: 10 min     On average, how many days per week do you engage in moderate to strenuous exercise (like a brisk walk)?: 7 days  Have you lost any weight without trying in the past 3 months?: No  Body mass index: (!) 41.68    Obesity Interventions:  low carbohydrate diet, exercise for at least 150 minutes/week  See AVS for additional education material  See A/P for plan and any pertinent orders          Dentist Screen:  Have you seen the dentist within the past year?: (!) No    Intervention:  Gareth Brandon dentist visit recommended. See AVS for additional education material  See A/P for any pertinent orders     Vision Screen:  Do you have difficulty driving, watching TV, or doing any of your daily activities because of your eyesight?: No  Have you had an eye exam within the past year?: Appointment is scheduled  Vision Screening    Right eye Left eye Both eyes   Without correction      With correction 20/40 20/50 20/40                       Objective   Vitals:    12/13/22 1257   BP: 132/80   Site: Left Upper Arm   Position: Sitting   Cuff Size: Large Adult   Pulse: 57   Temp: (!) 96.7 °F (35.9 °C)   TempSrc: Infrared   SpO2: 94%   Weight: 220 lb 9.6 oz (100.1 kg)   Height: 5' 1\" (1.549 m)      Body mass index is 41.68 kg/m². Allergies   Allergen Reactions    Latex     Prednisone Hives     Prior to Visit Medications    Medication Sig Taking? Authorizing Provider   oxyCODONE-acetaminophen (PERCOCET)  MG per tablet Take 1 tablet by mouth every 6 hours as needed for Pain for up to 32 days. Intended supply: 30 days Limit to three tabs per day on most days.  Yes Aleyda Pickard MD   traZODone (DESYREL) 100 MG tablet Take 1 tablet by mouth nightly Yes Aleyda Pickard MD   sertraline (ZOLOFT) 50 MG tablet Take 1 tablet by mouth nightly Yes Aleyda Pickard MD   rOPINIRole (REQUIP) 1 MG tablet Take 1 tablet by mouth at bedtime Yes Aleyda Pickard MD   omeprazole (PRILOSEC) 40 MG delayed release capsule Take 1 capsule by mouth Daily Yes Noris Kumar MD   hydrOXYzine HCl (ATARAX) 25 MG tablet Take 1 tablet by mouth every 8 hours as needed for Anxiety Yes Noris Kumar MD   Naproxen (EC-NAPROXEN) 375 MG EC tablet Take 1 tablet by mouth 2 times daily (with meals) Use sparingly due to mild kidney disease. Keep hydrated.  Yes Noris Kumar MD   acetaminophen (TYLENOL) 500 MG tablet Take 500 mg by mouth as needed for Pain Yes Historical Provider, MD   MYRBETRIQ 25 MG TB24 TAKE 1 TABLET BY MOUTH DAILY  Patient not taking: Reported on 12/13/2022  Noris Kumar MD   fluticasone-vilanterol (BREO ELLIPTA) 100-25 MCG/INH AEPB inhaler Inhale 1 puff into the lungs daily  Patient not taking: Reported on 12/13/2022  Noris Kumar MD   MAGNESIUM-OXIDE 400 (240 Mg) MG tablet TAKE 1 TABLET BY MOUTH EVERY DAY  Patient not taking: Reported on 12/13/2022  Historical Provider, MD   fluticasone (FLONASE) 50 MCG/ACT nasal spray 2 sprays by Each Nostril route daily  Patient not taking: No sig reported  Nrois Kumar MD   naloxone 4 MG/0.1ML LIQD nasal spray 1 spray by Nasal route as needed for Opioid Reversal  Patient not taking: Reported on 12/13/2022  Noris Kumar MD   methocarbamol (ROBAXIN) 500 MG tablet   Historical Provider, MD   Magnesium 400 MG TABS Take 1 tablet by mouth daily  Patient not taking: Reported on 12/13/2022  Noris Kumar MD   loperamide (RA ANTI-DIARRHEAL) 2 MG capsule Take 1 capsule by mouth 2 times daily as needed (stool leakage)  Patient not taking: Reported on 12/13/2022  Noris Kumar MD   albuterol sulfate HFA (VENTOLIN HFA) 108 (90 Base) MCG/ACT inhaler Inhale 2 puffs into the lungs every 6 hours as needed for Wheezing  Patient not taking: No sig reported  Noris Kumar MD       CareTe (Including outside providers/suppliers regularly involved in providing care):   Patient Care Team:  Noris Kumar MD as PCP - General (Family Medicine)  Debo Song MD as PCP - [facility] Empaneled Provider     Reviewed and updated this visit:  Tobacco  Allergies  Meds  Problems  Med Hx  Surg Hx  Soc Hx  Fam Hx

## 2022-12-13 NOTE — ASSESSMENT & PLAN NOTE
She has stable chronic back pain and oxycodone 10 mg 3 times a day and on some occasions 4 times a day enables her to be active and move around more. She does not feel she is at risk of falling. I will prescribe a refill.

## 2023-01-13 ENCOUNTER — OFFICE VISIT (OUTPATIENT)
Dept: FAMILY MEDICINE CLINIC | Age: 78
End: 2023-01-13
Payer: MEDICARE

## 2023-01-13 VITALS
SYSTOLIC BLOOD PRESSURE: 126 MMHG | DIASTOLIC BLOOD PRESSURE: 84 MMHG | WEIGHT: 221 LBS | HEIGHT: 61 IN | HEART RATE: 64 BPM | OXYGEN SATURATION: 93 % | TEMPERATURE: 97.1 F | BODY MASS INDEX: 41.72 KG/M2

## 2023-01-13 DIAGNOSIS — F33.41 RECURRENT MAJOR DEPRESSIVE DISORDER, IN PARTIAL REMISSION (HCC): ICD-10-CM

## 2023-01-13 DIAGNOSIS — M16.0 PRIMARY OSTEOARTHRITIS OF BOTH HIPS: ICD-10-CM

## 2023-01-13 DIAGNOSIS — G89.29 CHRONIC LEFT-SIDED LOW BACK PAIN WITH LEFT-SIDED SCIATICA: ICD-10-CM

## 2023-01-13 DIAGNOSIS — Z51.81 ENCOUNTER FOR THERAPEUTIC DRUG LEVEL MONITORING: ICD-10-CM

## 2023-01-13 DIAGNOSIS — N18.31 STAGE 3A CHRONIC KIDNEY DISEASE (HCC): ICD-10-CM

## 2023-01-13 DIAGNOSIS — M48.062 SPINAL STENOSIS OF LUMBAR REGION WITH NEUROGENIC CLAUDICATION: Primary | ICD-10-CM

## 2023-01-13 DIAGNOSIS — M54.42 CHRONIC LEFT-SIDED LOW BACK PAIN WITH LEFT-SIDED SCIATICA: ICD-10-CM

## 2023-01-13 DIAGNOSIS — E66.01 OBESITY, CLASS III, BMI 40-49.9 (MORBID OBESITY) (HCC): ICD-10-CM

## 2023-01-13 DIAGNOSIS — R73.03 PREDIABETES: ICD-10-CM

## 2023-01-13 PROBLEM — F11.90 CHRONIC, CONTINUOUS USE OF OPIOIDS: Status: RESOLVED | Noted: 2023-01-13 | Resolved: 2023-01-13

## 2023-01-13 PROBLEM — F11.90 CHRONIC, CONTINUOUS USE OF OPIOIDS: Status: ACTIVE | Noted: 2023-01-13

## 2023-01-13 LAB
ALBUMIN SERPL-MCNC: 4 G/DL (ref 3.4–5)
AMPHETAMINE SCREEN, URINE: ABNORMAL
ANION GAP SERPL CALCULATED.3IONS-SCNC: 12 MMOL/L (ref 3–16)
BARBITURATE SCREEN URINE: ABNORMAL
BENZODIAZEPINE SCREEN, URINE: ABNORMAL
BUN BLDV-MCNC: 29 MG/DL (ref 7–20)
CALCIUM SERPL-MCNC: 8.9 MG/DL (ref 8.3–10.6)
CANNABINOID SCREEN URINE: ABNORMAL
CHLORIDE BLD-SCNC: 101 MMOL/L (ref 99–110)
CO2: 23 MMOL/L (ref 21–32)
COCAINE METABOLITE SCREEN URINE: ABNORMAL
CREAT SERPL-MCNC: 0.8 MG/DL (ref 0.6–1.2)
FENTANYL SCREEN, URINE: ABNORMAL
GFR SERPL CREATININE-BSD FRML MDRD: >60 ML/MIN/{1.73_M2}
GLUCOSE BLD-MCNC: 88 MG/DL (ref 70–99)
Lab: ABNORMAL
METHADONE SCREEN, URINE: ABNORMAL
OPIATE SCREEN URINE: ABNORMAL
OXYCODONE URINE: POSITIVE
PH UA: 5
PHENCYCLIDINE SCREEN URINE: ABNORMAL
PHOSPHORUS: 4.1 MG/DL (ref 2.5–4.9)
POTASSIUM SERPL-SCNC: 4.7 MMOL/L (ref 3.5–5.1)
SODIUM BLD-SCNC: 136 MMOL/L (ref 136–145)

## 2023-01-13 PROCEDURE — 1036F TOBACCO NON-USER: CPT | Performed by: FAMILY MEDICINE

## 2023-01-13 PROCEDURE — G8484 FLU IMMUNIZE NO ADMIN: HCPCS | Performed by: FAMILY MEDICINE

## 2023-01-13 PROCEDURE — 1123F ACP DISCUSS/DSCN MKR DOCD: CPT | Performed by: FAMILY MEDICINE

## 2023-01-13 PROCEDURE — G8400 PT W/DXA NO RESULTS DOC: HCPCS | Performed by: FAMILY MEDICINE

## 2023-01-13 PROCEDURE — 1090F PRES/ABSN URINE INCON ASSESS: CPT | Performed by: FAMILY MEDICINE

## 2023-01-13 PROCEDURE — 36415 COLL VENOUS BLD VENIPUNCTURE: CPT | Performed by: FAMILY MEDICINE

## 2023-01-13 PROCEDURE — G8427 DOCREV CUR MEDS BY ELIG CLIN: HCPCS | Performed by: FAMILY MEDICINE

## 2023-01-13 PROCEDURE — G8417 CALC BMI ABV UP PARAM F/U: HCPCS | Performed by: FAMILY MEDICINE

## 2023-01-13 PROCEDURE — 99214 OFFICE O/P EST MOD 30 MIN: CPT | Performed by: FAMILY MEDICINE

## 2023-01-13 RX ORDER — OXYCODONE AND ACETAMINOPHEN 10; 325 MG/1; MG/1
1 TABLET ORAL EVERY 6 HOURS PRN
Qty: 100 TABLET | Refills: 0 | Status: SHIPPED | OUTPATIENT
Start: 2023-01-13 | End: 2023-02-12

## 2023-01-13 ASSESSMENT — ENCOUNTER SYMPTOMS
CONSTIPATION: 0
NAUSEA: 0
ABDOMINAL PAIN: 0

## 2023-01-13 NOTE — ASSESSMENT & PLAN NOTE
Listing her diabetic status as prediabetes since she has had no significant diabetes since her bariatric surgery.

## 2023-01-13 NOTE — PROGRESS NOTES
1/13/23    Lennox Hahn  1945    SUBJECTIVE    HPI - Danilo Claude is a 68 y.o. female who presents today for evaluation of:  Chief Complaint   Patient presents with    1 Month Follow-Up     pain     Pain today is 1/10. The increase to 10mg 3 times daily and occasionally qid helps. It enables her to be active and do beneficial things. Mood : does well staying on the meds. DM2 resolved     Ckd : tries to drink plenty water. Review of Systems   Gastrointestinal:  Negative for abdominal pain, constipation and nausea. Psychiatric/Behavioral:  Negative for dysphoric mood and sleep disturbance. The patient is not nervous/anxious. Allergies   Allergen Reactions    Latex     Prednisone Hives        OBJECTIVE    /84 (Site: Left Upper Arm, Position: Sitting, Cuff Size: Large Adult)   Pulse 64   Temp 97.1 °F (36.2 °C) (Infrared)   Ht 5' 1\" (1.549 m)   Wt 221 lb (100.2 kg)   SpO2 93%   BMI 41.76 kg/m²     Physical Exam   Constitutional:       General: Not in acute distress. Appearance: Normal appearance. Not ill-appearing. Eyes:      General: No scleral icterus. Cardiovascular:      Rate and Rhythm: Normal rate and regular rhythm. Heart sounds: No murmur heard. No friction rub. No gallop. Pulmonary:      Effort: Pulmonary effort is normal. No respiratory distress. Breath sounds: No wheezing, rhonchi or rales. Musculoskeletal:     Moves all extremities normally. Strong bilateral ADF and APF. Skin:     General: Skin is warm. Coloration: Skin is not jaundiced. Neurological:      Mental Status: Patient is alert. Psychiatric:         Behavior: Behavior normal.         Thought Content: Thought content normal.         Judgment: Judgment normal.          ASSESSMENT/PLAN:    1. Spinal stenosis of lumbar region with neurogenic claudication  Assessment & Plan:   Continue oxycodone. Urine drug screen obtained today.   Orders:  -     oxyCODONE-acetaminophen (PERCOCET)  MG per tablet; Take 1 tablet by mouth every 6 hours as needed for Pain for up to 30 days. Intended supply: 30 days Limit to three tabs per day on most days. Max Daily Amount: 4 tablets, Disp-100 tablet, R-0Normal  2. Back Pain, Chronic left-sided low back pain with left-sided sciatica  Assessment & Plan:   She indicates that she is significantly more active with the Percocet and so it is worthwhile. The increased 200 pills a month instead of 90 pills a month has significantly helped her. Orders:  -     oxyCODONE-acetaminophen (PERCOCET)  MG per tablet; Take 1 tablet by mouth every 6 hours as needed for Pain for up to 30 days. Intended supply: 30 days Limit to three tabs per day on most days. Max Daily Amount: 4 tablets, Disp-100 tablet, R-0Normal  3. OA, Hips, Primary osteoarthritis of both hips  Assessment & Plan:   Continuing oxycodone and checking urine drug screen today for chronic hip pain and back pain. Orders:  -     oxyCODONE-acetaminophen (PERCOCET)  MG per tablet; Take 1 tablet by mouth every 6 hours as needed for Pain for up to 30 days. Intended supply: 30 days Limit to three tabs per day on most days. Max Daily Amount: 4 tablets, Disp-100 tablet, R-0Normal  4. Recurrent major depressive disorder, in partial remission (HCC)  Assessment & Plan:   Continue sertraline and trazodone. Her chronic depression is currently stable. 5. Stage 3a chronic kidney disease ([facility] [address]  Assessment & Plan:   Checking renal function panel to monitor chronic kidney disease. Orders:  -     Renal Function Panel  6. Prediabetes, formerly DM2 prior ot gastric bypass. Assessment & Plan:   Listing her diabetic status as prediabetes since she has had no significant diabetes since her bariatric surgery. 7. Obesity, Class III, BMI 40-49.9 (morbid obesity) ([facility] [address]  Assessment & Plan:   Encouraged efforts at weight loss.   8. Encounter for therapeutic drug level monitoring   Assessment & Plan:   Checking UDS since she is on chronic opioid medication  Orders:  -     Urine Drug Screen      Return in about 4 weeks (around 2/10/2023) for Back pain.    Ivon Song MD

## 2023-01-13 NOTE — ASSESSMENT & PLAN NOTE
She indicates that she is significantly more active with the Percocet and so it is worthwhile. The increased 200 pills a month instead of 90 pills a month has significantly helped her.

## 2023-02-13 ENCOUNTER — OFFICE VISIT (OUTPATIENT)
Dept: FAMILY MEDICINE CLINIC | Age: 78
End: 2023-02-13
Payer: MEDICARE

## 2023-02-13 VITALS
TEMPERATURE: 96.6 F | OXYGEN SATURATION: 95 % | WEIGHT: 222.2 LBS | DIASTOLIC BLOOD PRESSURE: 82 MMHG | HEIGHT: 61 IN | HEART RATE: 72 BPM | BODY MASS INDEX: 41.95 KG/M2 | SYSTOLIC BLOOD PRESSURE: 138 MMHG

## 2023-02-13 DIAGNOSIS — M16.0 PRIMARY OSTEOARTHRITIS OF BOTH HIPS: ICD-10-CM

## 2023-02-13 DIAGNOSIS — M48.062 SPINAL STENOSIS OF LUMBAR REGION WITH NEUROGENIC CLAUDICATION: Primary | ICD-10-CM

## 2023-02-13 DIAGNOSIS — E66.01 OBESITY, CLASS III, BMI 40-49.9 (MORBID OBESITY) (HCC): ICD-10-CM

## 2023-02-13 DIAGNOSIS — M54.42 CHRONIC LEFT-SIDED LOW BACK PAIN WITH LEFT-SIDED SCIATICA: ICD-10-CM

## 2023-02-13 DIAGNOSIS — N18.31 STAGE 3A CHRONIC KIDNEY DISEASE (HCC): ICD-10-CM

## 2023-02-13 DIAGNOSIS — G89.29 CHRONIC LEFT-SIDED LOW BACK PAIN WITH LEFT-SIDED SCIATICA: ICD-10-CM

## 2023-02-13 PROCEDURE — G8427 DOCREV CUR MEDS BY ELIG CLIN: HCPCS | Performed by: FAMILY MEDICINE

## 2023-02-13 PROCEDURE — 1036F TOBACCO NON-USER: CPT | Performed by: FAMILY MEDICINE

## 2023-02-13 PROCEDURE — 99213 OFFICE O/P EST LOW 20 MIN: CPT | Performed by: FAMILY MEDICINE

## 2023-02-13 PROCEDURE — 1123F ACP DISCUSS/DSCN MKR DOCD: CPT | Performed by: FAMILY MEDICINE

## 2023-02-13 PROCEDURE — G8484 FLU IMMUNIZE NO ADMIN: HCPCS | Performed by: FAMILY MEDICINE

## 2023-02-13 PROCEDURE — 1090F PRES/ABSN URINE INCON ASSESS: CPT | Performed by: FAMILY MEDICINE

## 2023-02-13 PROCEDURE — G8417 CALC BMI ABV UP PARAM F/U: HCPCS | Performed by: FAMILY MEDICINE

## 2023-02-13 PROCEDURE — G8400 PT W/DXA NO RESULTS DOC: HCPCS | Performed by: FAMILY MEDICINE

## 2023-02-13 RX ORDER — OXYCODONE AND ACETAMINOPHEN 10; 325 MG/1; MG/1
1 TABLET ORAL EVERY 6 HOURS PRN
Qty: 100 TABLET | Refills: 0 | Status: SHIPPED | OUTPATIENT
Start: 2023-02-13 | End: 2023-03-15

## 2023-02-13 ASSESSMENT — ENCOUNTER SYMPTOMS: CONSTIPATION: 0

## 2023-02-13 NOTE — PROGRESS NOTES
2/13/23    Lester Reyes  1945    SUBJECTIVE    HPI - Sharon Fish is a 68 y.o. female who presents today for evaluation of:  Chief Complaint   Patient presents with    Follow-up     Pain      Back Pain : About the same as usual. She feels the Percocet 10mg qid is what keeps her going and she does not think she can reduce it. She walks a lot. Seh massages her legs. She does some stretching. CKD : She drinks a lot of water. Mood : Mood is ok or better with the current weather. Severe Obesity : She is trying to be more active and eat less. Heavy sweater today. Review of Systems   Constitutional:  Negative for chills, fatigue and fever. Cardiovascular:  Negative for chest pain and palpitations. Gastrointestinal:  Negative for constipation. Endocrine: Negative for cold intolerance and heat intolerance. Genitourinary:  Negative for difficulty urinating and dysuria. Allergies   Allergen Reactions    Latex     Prednisone Hives        OBJECTIVE    /82 (Site: Left Upper Arm, Position: Sitting, Cuff Size: Large Adult)   Pulse 72   Temp (!) 96.6 °F (35.9 °C) (Infrared)   Ht 5' 1\" (1.549 m)   Wt 222 lb 3.2 oz (100.8 kg)   SpO2 95%   BMI 41.98 kg/m²     Physical Exam   Constitutional:       General: Not in acute distress. Appearance: Normal appearance. Not ill-appearing. Eyes:      General: No scleral icterus. Cardiovascular:      Rate and Rhythm: Normal rate and regular rhythm. Heart sounds: No murmur heard. No friction rub. No gallop. Pulmonary:      Effort: Pulmonary effort is normal. No respiratory distress. Breath sounds: No wheezing, rhonchi or rales. Abdominal:      Palpations: Abdomen is soft. There is no mass. Tenderness: There is no abdominal tenderness. Musculoskeletal:     Moves all extremities normally. Skin:     General: Skin is warm. Coloration: Skin is not jaundiced. Neurological:      Mental Status: Patient is alert.    Psychiatric: Behavior: Behavior normal.         Thought Content: Thought content normal.         Judgment: Judgment normal.    Reviewed: PDMP shows #10 Percocet monthly x past 7 months on about the 13th. ASSESSMENT/PLAN:    1. Spinal stenosis of lumbar region with neurogenic claudication  Assessment & Plan:   Oxycodone 3 and sometimes 4/day seems to help her with being able to be more active. She is making a point to get outside and enjoy the nice weather that we have had recently. I will refill the oxycodone expecting 100 pills to last 30 days. Orders:  -     oxyCODONE-acetaminophen (PERCOCET)  MG per tablet; Take 1 tablet by mouth every 6 hours as needed for Pain for up to 30 days. Intended supply: 30 days Limit to three tabs per day on most days. Max Daily Amount: 4 tablets, Disp-100 tablet, R-0Normal  2. Back Pain, Chronic left-sided low back pain with left-sided sciatica  -     oxyCODONE-acetaminophen (PERCOCET)  MG per tablet; Take 1 tablet by mouth every 6 hours as needed for Pain for up to 30 days. Intended supply: 30 days Limit to three tabs per day on most days. Max Daily Amount: 4 tablets, Disp-100 tablet, R-0Normal  3. OA, Hips, Primary osteoarthritis of both hips  -     oxyCODONE-acetaminophen (PERCOCET)  MG per tablet; Take 1 tablet by mouth every 6 hours as needed for Pain for up to 30 days. Intended supply: 30 days Limit to three tabs per day on most days. Max Daily Amount: 4 tablets, Disp-100 tablet, R-0Normal  4. CKD, Stage 3a chronic kidney disease ([facility] [address]  Assessment & Plan:   I am ordering a urine microalbumin creatinine to determine if an SGLT2 would be indicated for all cause mortality reduction. Orders:  -     Microalbumin / Creatinine Urine Ratio  5. Obesity, Class III, BMI 40-49.9 (morbid obesity) ([facility] [address]  Assessment & Plan:   Encouraged continued effort to lose weight.       Return in about 4 weeks (around 3/13/2023) for Goal to lose 5#.   Christo Kang MD

## 2023-02-13 NOTE — ASSESSMENT & PLAN NOTE
Oxycodone 3 and sometimes 4/day seems to help her with being able to be more active. She is making a point to get outside and enjoy the nice weather that we have had recently. I will refill the oxycodone expecting 100 pills to last 30 days.

## 2023-02-13 NOTE — ASSESSMENT & PLAN NOTE
I am ordering a urine microalbumin creatinine to determine if an SGLT2 would be indicated for all cause mortality reduction.

## 2023-02-14 LAB
CREATININE URINE: 271.2 MG/DL (ref 28–259)
MICROALBUMIN UR-MCNC: 5.6 MG/DL
MICROALBUMIN/CREAT UR-RTO: 20.6 MG/G (ref 0–30)

## 2023-02-27 DIAGNOSIS — G25.81 RLS (RESTLESS LEGS SYNDROME): ICD-10-CM

## 2023-02-27 RX ORDER — ROPINIROLE 1 MG/1
1 TABLET, FILM COATED ORAL NIGHTLY
Qty: 90 TABLET | Refills: 1 | Status: SHIPPED | OUTPATIENT
Start: 2023-02-27

## 2023-03-10 DIAGNOSIS — K21.9 GASTROESOPHAGEAL REFLUX DISEASE WITHOUT ESOPHAGITIS: ICD-10-CM

## 2023-03-10 RX ORDER — OMEPRAZOLE 20 MG/1
CAPSULE, DELAYED RELEASE ORAL
Qty: 180 CAPSULE | Refills: 1 | OUTPATIENT
Start: 2023-03-10

## 2023-03-13 ENCOUNTER — OFFICE VISIT (OUTPATIENT)
Dept: FAMILY MEDICINE CLINIC | Age: 78
End: 2023-03-13
Payer: MEDICARE

## 2023-03-13 VITALS
BODY MASS INDEX: 41.8 KG/M2 | TEMPERATURE: 97.3 F | DIASTOLIC BLOOD PRESSURE: 84 MMHG | WEIGHT: 221.4 LBS | HEIGHT: 61 IN | HEART RATE: 63 BPM | SYSTOLIC BLOOD PRESSURE: 130 MMHG | OXYGEN SATURATION: 98 %

## 2023-03-13 DIAGNOSIS — M16.0 PRIMARY OSTEOARTHRITIS OF BOTH HIPS: ICD-10-CM

## 2023-03-13 DIAGNOSIS — M48.062 SPINAL STENOSIS OF LUMBAR REGION WITH NEUROGENIC CLAUDICATION: ICD-10-CM

## 2023-03-13 DIAGNOSIS — G89.29 CHRONIC LEFT-SIDED LOW BACK PAIN WITH LEFT-SIDED SCIATICA: Primary | ICD-10-CM

## 2023-03-13 DIAGNOSIS — E66.01 OBESITY, CLASS III, BMI 40-49.9 (MORBID OBESITY) (HCC): ICD-10-CM

## 2023-03-13 DIAGNOSIS — M54.42 CHRONIC LEFT-SIDED LOW BACK PAIN WITH LEFT-SIDED SCIATICA: Primary | ICD-10-CM

## 2023-03-13 PROCEDURE — G8400 PT W/DXA NO RESULTS DOC: HCPCS | Performed by: FAMILY MEDICINE

## 2023-03-13 PROCEDURE — 1090F PRES/ABSN URINE INCON ASSESS: CPT | Performed by: FAMILY MEDICINE

## 2023-03-13 PROCEDURE — G8484 FLU IMMUNIZE NO ADMIN: HCPCS | Performed by: FAMILY MEDICINE

## 2023-03-13 PROCEDURE — 1123F ACP DISCUSS/DSCN MKR DOCD: CPT | Performed by: FAMILY MEDICINE

## 2023-03-13 PROCEDURE — G8427 DOCREV CUR MEDS BY ELIG CLIN: HCPCS | Performed by: FAMILY MEDICINE

## 2023-03-13 PROCEDURE — 1036F TOBACCO NON-USER: CPT | Performed by: FAMILY MEDICINE

## 2023-03-13 PROCEDURE — G8417 CALC BMI ABV UP PARAM F/U: HCPCS | Performed by: FAMILY MEDICINE

## 2023-03-13 PROCEDURE — 99213 OFFICE O/P EST LOW 20 MIN: CPT | Performed by: FAMILY MEDICINE

## 2023-03-13 RX ORDER — OXYCODONE AND ACETAMINOPHEN 10; 325 MG/1; MG/1
1 TABLET ORAL EVERY 6 HOURS PRN
Qty: 100 TABLET | Refills: 0 | Status: SHIPPED | OUTPATIENT
Start: 2023-03-13 | End: 2023-04-14

## 2023-03-13 NOTE — PROGRESS NOTES
3/13/23    Chip Diaz  1945    SUBJECTIVE    HPI - Andrea Turner is a 66 y.o. female who presents today for evaluation of:  Chief Complaint   Patient presents with    Follow-up     Goal to lose 5 pounds      Pain : pain is about the same. Has to sleep on a sofa. Oxycodone tis and occasionally 4 times a day controls the pain. She needs to pick it up today. Obesity : She is eatign raw vegetables and has lost a couple pounds. Wonders if eating rice helps with losing wt. She does get out and walk some. Depression :   Mood is a lot better in current living situation. Review of Systems   Constitutional:  Negative for activity change, appetite change, fatigue and fever. Cardiovascular:  Negative for chest pain and leg swelling. Neurological:  Negative for dizziness and headaches. Allergies   Allergen Reactions    Latex     Prednisone Hives        OBJECTIVE    /84 (Site: Left Upper Arm, Position: Sitting, Cuff Size: Large Adult)   Pulse 63   Temp 97.3 °F (36.3 °C) (Infrared)   Ht 5' 1\" (1.549 m)   Wt 221 lb 6.4 oz (100.4 kg)   SpO2 98%   BMI 41.83 kg/m²     Physical Exam   Constitutional:       General: Not in acute distress. Appearance: Normal appearance. Not ill-appearing. Eyes:      General: No scleral icterus. Cardiovascular:      Rate and Rhythm: Normal rate and regular rhythm. Heart sounds: No murmur heard. No friction rub. No gallop. Pulmonary:      Effort: Pulmonary effort is normal. No respiratory distress. Breath sounds: No wheezing, rhonchi or rales. Abdominal:      Palpations: Abdomen is soft. There is no mass. Tenderness: There is no abdominal tenderness. Musculoskeletal:     Moves all extremities normally. Skin:     General: Skin is warm. Coloration: Skin is not jaundiced. Neurological:      Mental Status: Patient is alert. Psychiatric:         Behavior: Behavior normal.         Thought Content:  Thought content normal.         Judgment: Judgment normal.    Reviewed:    PDMP shows last oxycodone dispensed 2/13/23. ASSESSMENT/PLAN:    1. Back Pain, Chronic left-sided low back pain with left-sided sciatica  Assessment & Plan:   Who back pain is well controlled with oxycodone. It is okay for her to  the oxycodone prescription today even though today's prescription is being written for the 30-day period starting 3/15/2022. Orders:  -     oxyCODONE-acetaminophen (PERCOCET)  MG per tablet; Take 1 tablet by mouth every 6 hours as needed for Pain for up to 32 days. Intended supply: 30 days (3/15/23 to 4/14/23) Limit to three tabs per day on most days. Max Daily Amount: 4 tablets, Disp-100 tablet, R-0Normal  2. Obesity, Class III, BMI 40-49.9 (morbid obesity) ([address]  Assessment & Plan:   She has lost a couple of pounds which is wonderful. She understands that weight loss will help to reduce her pain. 3. Spinal stenosis of lumbar region with neurogenic claudication  Assessment & Plan:   Spinal stenosis is another reason for her chronic pain. See back pain note. Orders:  -     oxyCODONE-acetaminophen (PERCOCET)  MG per tablet; Take 1 tablet by mouth every 6 hours as needed for Pain for up to 32 days. Intended supply: 30 days (3/15/23 to 4/14/23) Limit to three tabs per day on most days. Max Daily Amount: 4 tablets, Disp-100 tablet, R-0Normal  4. OA, Hips, Primary osteoarthritis of both hips  Assessment & Plan:   Hip arthritis is another reason that she takes the oxycodone. See the note under back pain  Orders:  -     oxyCODONE-acetaminophen (PERCOCET)  MG per tablet; Take 1 tablet by mouth every 6 hours as needed for Pain for up to 32 days. Intended supply: 30 days (3/15/23 to 4/14/23) Limit to three tabs per day on most days. Max Daily Amount: 4 tablets, Disp-100 tablet, R-0Normal        Return in about 4 weeks (around 4/10/2023) for Back pain.    Eduardo Priest MD

## 2023-03-13 NOTE — ASSESSMENT & PLAN NOTE
Who back pain is well controlled with oxycodone. It is okay for her to  the oxycodone prescription today even though today's prescription is being written for the 30-day period starting 3/15/2022.

## 2023-04-11 PROBLEM — M54.41 CHRONIC BILATERAL LOW BACK PAIN WITH BILATERAL SCIATICA: Chronic | Status: ACTIVE | Noted: 2017-05-31

## 2023-04-11 PROBLEM — N39.41 URGE INCONTINENCE: Chronic | Status: ACTIVE | Noted: 2022-03-30

## 2023-04-11 PROBLEM — M54.41 CHRONIC BILATERAL LOW BACK PAIN WITH BILATERAL SCIATICA: Status: ACTIVE | Noted: 2017-05-31

## 2023-04-11 PROBLEM — M54.42 CHRONIC BILATERAL LOW BACK PAIN WITH BILATERAL SCIATICA: Chronic | Status: ACTIVE | Noted: 2017-05-31

## 2023-04-11 PROBLEM — G89.29 CHRONIC BILATERAL LOW BACK PAIN WITH BILATERAL SCIATICA: Chronic | Status: ACTIVE | Noted: 2017-05-31

## 2023-04-11 PROBLEM — N18.31 STAGE 3A CHRONIC KIDNEY DISEASE (HCC): Chronic | Status: ACTIVE | Noted: 2017-09-16

## 2023-05-12 ENCOUNTER — OFFICE VISIT (OUTPATIENT)
Dept: FAMILY MEDICINE CLINIC | Age: 78
End: 2023-05-12
Payer: MEDICARE

## 2023-05-12 VITALS
BODY MASS INDEX: 42.48 KG/M2 | DIASTOLIC BLOOD PRESSURE: 80 MMHG | OXYGEN SATURATION: 96 % | HEIGHT: 61 IN | HEART RATE: 77 BPM | RESPIRATION RATE: 16 BRPM | SYSTOLIC BLOOD PRESSURE: 118 MMHG | WEIGHT: 225 LBS

## 2023-05-12 DIAGNOSIS — M48.062 SPINAL STENOSIS OF LUMBAR REGION WITH NEUROGENIC CLAUDICATION: Primary | ICD-10-CM

## 2023-05-12 DIAGNOSIS — F11.20 OPIOID DEPENDENCE WITH CURRENT USE (HCC): ICD-10-CM

## 2023-05-12 DIAGNOSIS — M54.41 CHRONIC BILATERAL LOW BACK PAIN WITH BILATERAL SCIATICA: Chronic | ICD-10-CM

## 2023-05-12 DIAGNOSIS — N18.31 STAGE 3A CHRONIC KIDNEY DISEASE (HCC): Chronic | ICD-10-CM

## 2023-05-12 DIAGNOSIS — G89.29 CHRONIC BILATERAL LOW BACK PAIN WITH BILATERAL SCIATICA: Chronic | ICD-10-CM

## 2023-05-12 DIAGNOSIS — G25.81 RLS (RESTLESS LEGS SYNDROME): ICD-10-CM

## 2023-05-12 DIAGNOSIS — M54.42 CHRONIC BILATERAL LOW BACK PAIN WITH BILATERAL SCIATICA: Chronic | ICD-10-CM

## 2023-05-12 DIAGNOSIS — M16.0 PRIMARY OSTEOARTHRITIS OF BOTH HIPS: ICD-10-CM

## 2023-05-12 PROCEDURE — 1123F ACP DISCUSS/DSCN MKR DOCD: CPT | Performed by: FAMILY MEDICINE

## 2023-05-12 PROCEDURE — G8400 PT W/DXA NO RESULTS DOC: HCPCS | Performed by: FAMILY MEDICINE

## 2023-05-12 PROCEDURE — 1036F TOBACCO NON-USER: CPT | Performed by: FAMILY MEDICINE

## 2023-05-12 PROCEDURE — G8427 DOCREV CUR MEDS BY ELIG CLIN: HCPCS | Performed by: FAMILY MEDICINE

## 2023-05-12 PROCEDURE — G8417 CALC BMI ABV UP PARAM F/U: HCPCS | Performed by: FAMILY MEDICINE

## 2023-05-12 PROCEDURE — 1090F PRES/ABSN URINE INCON ASSESS: CPT | Performed by: FAMILY MEDICINE

## 2023-05-12 PROCEDURE — 99213 OFFICE O/P EST LOW 20 MIN: CPT | Performed by: FAMILY MEDICINE

## 2023-05-12 RX ORDER — OXYCODONE AND ACETAMINOPHEN 10; 325 MG/1; MG/1
1 TABLET ORAL EVERY 6 HOURS PRN
Qty: 100 TABLET | Refills: 0 | Status: SHIPPED | OUTPATIENT
Start: 2023-05-12 | End: 2023-06-13

## 2023-05-12 RX ORDER — ROPINIROLE 1 MG/1
1 TABLET, FILM COATED ORAL NIGHTLY
Qty: 90 TABLET | Refills: 1 | Status: SHIPPED | OUTPATIENT
Start: 2023-05-12

## 2023-05-12 RX ORDER — NAPROXEN 375 MG/1
375 TABLET, DELAYED RELEASE ORAL NIGHTLY
Qty: 90 TABLET | Refills: 0 | Status: SHIPPED | OUTPATIENT
Start: 2023-05-12

## 2023-05-12 SDOH — ECONOMIC STABILITY: HOUSING INSECURITY
IN THE LAST 12 MONTHS, WAS THERE A TIME WHEN YOU DID NOT HAVE A STEADY PLACE TO SLEEP OR SLEPT IN A SHELTER (INCLUDING NOW)?: NO

## 2023-05-12 SDOH — ECONOMIC STABILITY: FOOD INSECURITY: WITHIN THE PAST 12 MONTHS, THE FOOD YOU BOUGHT JUST DIDN'T LAST AND YOU DIDN'T HAVE MONEY TO GET MORE.: NEVER TRUE

## 2023-05-12 SDOH — ECONOMIC STABILITY: INCOME INSECURITY: HOW HARD IS IT FOR YOU TO PAY FOR THE VERY BASICS LIKE FOOD, HOUSING, MEDICAL CARE, AND HEATING?: NOT HARD AT ALL

## 2023-05-12 SDOH — ECONOMIC STABILITY: FOOD INSECURITY: WITHIN THE PAST 12 MONTHS, YOU WORRIED THAT YOUR FOOD WOULD RUN OUT BEFORE YOU GOT MONEY TO BUY MORE.: NEVER TRUE

## 2023-05-12 ASSESSMENT — PATIENT HEALTH QUESTIONNAIRE - PHQ9
1. LITTLE INTEREST OR PLEASURE IN DOING THINGS: 0
SUM OF ALL RESPONSES TO PHQ9 QUESTIONS 1 & 2: 0
SUM OF ALL RESPONSES TO PHQ QUESTIONS 1-9: 0
SUM OF ALL RESPONSES TO PHQ QUESTIONS 1-9: 0
2. FEELING DOWN, DEPRESSED OR HOPELESS: 0
SUM OF ALL RESPONSES TO PHQ QUESTIONS 1-9: 0
SUM OF ALL RESPONSES TO PHQ QUESTIONS 1-9: 0

## 2023-05-12 NOTE — PROGRESS NOTES
of lumbar region with neurogenic claudication  Assessment & Plan:   I will refill oxycodone at the same dose that she has taken for quite some time. I believe the benefit exceeds the risk. Also I will put out a refill for naproxen. I counseled her to keep well-hydrated when taking the naproxen. Orders:  -     oxyCODONE-acetaminophen (PERCOCET)  MG per tablet; Take 1 tablet by mouth every 6 hours as needed for Pain for up to 32 days. Intended supply: to last until 6/13/2023. Limit to three tabs per day on most days. Max Daily Amount: 4 tablets, Disp-100 tablet, R-0Normal  -     Naproxen (EC-NAPROXEN) 375 MG EC tablet; Take 1 tablet by mouth nightly Use sparingly due to mild kidney disease. Keep hydrated. , Disp-90 tablet, R-0**Patient requests 90 days supply**Normal  2. Back pain, Chronic bilateral low back pain with bilateral sciatica  3. Opioid dependence with current use (HCC)  4. OA, Hips, Primary osteoarthritis of both hips  -     oxyCODONE-acetaminophen (PERCOCET)  MG per tablet; Take 1 tablet by mouth every 6 hours as needed for Pain for up to 32 days. Intended supply: to last until 6/13/2023. Limit to three tabs per day on most days. Max Daily Amount: 4 tablets, Disp-100 tablet, R-0Normal  5. RLS (restless legs syndrome)  Assessment & Plan:   Restless leg syndrome is well controlled and I will refill ropinirole. Orders:  -     rOPINIRole (REQUIP) 1 MG tablet; Take 1 tablet by mouth at bedtime, Disp-90 tablet, R-1Normal  6. CKD, Stage 3a chronic kidney disease ([facility]  Assessment & Plan:   I counseled her to keep well-hydrated when using the naproxen. Return in about 4 weeks (around 6/9/2023) for Pain (can cancel the 6/16/23 appt .    Mili Reed MD

## 2023-05-13 NOTE — ASSESSMENT & PLAN NOTE
I will refill oxycodone at the same dose that she has taken for quite some time. I believe the benefit exceeds the risk. Also I will put out a refill for naproxen. I counseled her to keep well-hydrated when taking the naproxen.

## 2023-05-17 DIAGNOSIS — M48.062 SPINAL STENOSIS OF LUMBAR REGION WITH NEUROGENIC CLAUDICATION: ICD-10-CM

## 2023-05-17 RX ORDER — NABUMETONE 500 MG/1
500 TABLET, FILM COATED ORAL 2 TIMES DAILY PRN
Qty: 60 TABLET | Refills: 0 | Status: SHIPPED | OUTPATIENT
Start: 2023-05-17

## 2023-05-22 DIAGNOSIS — M48.062 SPINAL STENOSIS OF LUMBAR REGION WITH NEUROGENIC CLAUDICATION: ICD-10-CM

## 2023-06-09 ENCOUNTER — OFFICE VISIT (OUTPATIENT)
Dept: FAMILY MEDICINE CLINIC | Age: 78
End: 2023-06-09
Payer: MEDICARE

## 2023-06-09 ENCOUNTER — TELEPHONE (OUTPATIENT)
Dept: FAMILY MEDICINE CLINIC | Age: 78
End: 2023-06-09

## 2023-06-09 VITALS
DIASTOLIC BLOOD PRESSURE: 82 MMHG | TEMPERATURE: 97.3 F | WEIGHT: 227.6 LBS | SYSTOLIC BLOOD PRESSURE: 126 MMHG | BODY MASS INDEX: 43 KG/M2 | OXYGEN SATURATION: 95 % | HEART RATE: 58 BPM

## 2023-06-09 DIAGNOSIS — Z59.6 LOW INCOME: ICD-10-CM

## 2023-06-09 DIAGNOSIS — M48.062 SPINAL STENOSIS OF LUMBAR REGION WITH NEUROGENIC CLAUDICATION: ICD-10-CM

## 2023-06-09 DIAGNOSIS — M16.0 PRIMARY OSTEOARTHRITIS OF BOTH HIPS: ICD-10-CM

## 2023-06-09 DIAGNOSIS — F43.21 GRIEF: ICD-10-CM

## 2023-06-09 PROCEDURE — 1123F ACP DISCUSS/DSCN MKR DOCD: CPT | Performed by: FAMILY MEDICINE

## 2023-06-09 PROCEDURE — G8417 CALC BMI ABV UP PARAM F/U: HCPCS | Performed by: FAMILY MEDICINE

## 2023-06-09 PROCEDURE — 1036F TOBACCO NON-USER: CPT | Performed by: FAMILY MEDICINE

## 2023-06-09 PROCEDURE — 99213 OFFICE O/P EST LOW 20 MIN: CPT | Performed by: FAMILY MEDICINE

## 2023-06-09 PROCEDURE — G8428 CUR MEDS NOT DOCUMENT: HCPCS | Performed by: FAMILY MEDICINE

## 2023-06-09 PROCEDURE — 1090F PRES/ABSN URINE INCON ASSESS: CPT | Performed by: FAMILY MEDICINE

## 2023-06-09 PROCEDURE — G8400 PT W/DXA NO RESULTS DOC: HCPCS | Performed by: FAMILY MEDICINE

## 2023-06-09 RX ORDER — BUSPIRONE HYDROCHLORIDE 10 MG/1
10 TABLET ORAL 3 TIMES DAILY PRN
Qty: 20 TABLET | Refills: 0 | Status: SHIPPED | OUTPATIENT
Start: 2023-06-09 | End: 2023-06-16

## 2023-06-09 RX ORDER — OXYCODONE AND ACETAMINOPHEN 10; 325 MG/1; MG/1
1 TABLET ORAL EVERY 6 HOURS PRN
Qty: 100 TABLET | Refills: 0 | Status: SHIPPED | OUTPATIENT
Start: 2023-06-09 | End: 2023-07-13

## 2023-06-09 SDOH — ECONOMIC STABILITY - INCOME SECURITY: LOW INCOME: Z59.6

## 2023-06-09 ASSESSMENT — ENCOUNTER SYMPTOMS
DIARRHEA: 1
CONSTIPATION: 0

## 2023-06-09 NOTE — PROGRESS NOTES
23    Collins Tinajero  1945    SUBJECTIVE    HPI - Hina Jean is a 66 y.o. female who presents today for evaluation of:  Chief Complaint   Patient presents with    1 Month Follow-Up     pain     Back pain : She has back pain that is about the same as usual.  She manages to control it with Percocet no more than 100 tablets per 30 days. She will need today's prescription early since she will be traveling. She understands that it needs to last until the date it would have lasted if she did not  prescriptions early. Grief: Her son  recently. A support group helped when her   a few years ago. She is planning to travel to a sister in [place]. Review of Systems   Gastrointestinal:  Positive for diarrhea. Negative for constipation. Psychiatric/Behavioral:  Negative for self-injury and suicidal ideas. The patient is nervous/anxious. Allergies   Allergen Reactions    Latex     Prednisone Hives        OBJECTIVE    /82 (Site: Right Upper Arm, Position: Sitting, Cuff Size: Large Adult)   Pulse 58   Temp 97.3 °F (36.3 °C) (Infrared)   Wt 227 lb 9.6 oz (103.2 kg)   SpO2 95%   BMI 43.00 kg/m²     Physical Exam   Constitutional:       General: Not in acute distress. Appearance: Normal appearance. Not ill-appearing. Eyes:      General: No scleral icterus. Cardiovascular:      Rate and Rhythm: Normal rate and regular rhythm. Heart sounds: No murmur heard. No friction rub. No gallop. Pulmonary:      Effort: Pulmonary effort is normal. No respiratory distress. Breath sounds: No wheezing, rhonchi or rales. Abdominal:      Palpations: Abdomen is soft. There is no mass. Tenderness: There is no abdominal tenderness. Musculoskeletal:     Moves all extremities normally. Skin:     General: Skin is warm. Coloration: Skin is not jaundiced. Neurological:      Mental Status: Patient is alert.    Psychiatric:         Behavior: Behavior normal.

## 2023-06-09 NOTE — ASSESSMENT & PLAN NOTE
After she left the room with my visit she asked staff for me to send a prescription for anxiety for about a week. I will send a prescription of buspirone.

## 2023-06-09 NOTE — ASSESSMENT & PLAN NOTE
I will continue to prescribe oxycodone. I informed her that she is using it at the upper limits of the dosage that I am willing to prescribe. I am noting that she got several prescriptions a few days early in a row. It appears that her next appointment will actually be 1 day late so I am hopeful that she can stretch out the pills that she takes up today to last until that appointment. If she does that it will fix the recurrent problem of prescriptions needing to be dispensed early for transportation reasons.

## 2023-07-14 ENCOUNTER — OFFICE VISIT (OUTPATIENT)
Dept: FAMILY MEDICINE CLINIC | Age: 78
End: 2023-07-14
Payer: MEDICARE

## 2023-07-14 VITALS
SYSTOLIC BLOOD PRESSURE: 140 MMHG | DIASTOLIC BLOOD PRESSURE: 94 MMHG | HEART RATE: 61 BPM | OXYGEN SATURATION: 94 % | BODY MASS INDEX: 43.23 KG/M2 | WEIGHT: 228.8 LBS

## 2023-07-14 DIAGNOSIS — J31.0 GUSTATORY RHINITIS: Primary | ICD-10-CM

## 2023-07-14 DIAGNOSIS — M48.062 SPINAL STENOSIS OF LUMBAR REGION WITH NEUROGENIC CLAUDICATION: ICD-10-CM

## 2023-07-14 DIAGNOSIS — M16.0 PRIMARY OSTEOARTHRITIS OF BOTH HIPS: ICD-10-CM

## 2023-07-14 DIAGNOSIS — F41.1 GAD (GENERALIZED ANXIETY DISORDER): ICD-10-CM

## 2023-07-14 DIAGNOSIS — G25.81 RLS (RESTLESS LEGS SYNDROME): ICD-10-CM

## 2023-07-14 PROCEDURE — G8400 PT W/DXA NO RESULTS DOC: HCPCS | Performed by: FAMILY MEDICINE

## 2023-07-14 PROCEDURE — 1123F ACP DISCUSS/DSCN MKR DOCD: CPT | Performed by: FAMILY MEDICINE

## 2023-07-14 PROCEDURE — G8427 DOCREV CUR MEDS BY ELIG CLIN: HCPCS | Performed by: FAMILY MEDICINE

## 2023-07-14 PROCEDURE — 1090F PRES/ABSN URINE INCON ASSESS: CPT | Performed by: FAMILY MEDICINE

## 2023-07-14 PROCEDURE — 99213 OFFICE O/P EST LOW 20 MIN: CPT | Performed by: FAMILY MEDICINE

## 2023-07-14 PROCEDURE — G8417 CALC BMI ABV UP PARAM F/U: HCPCS | Performed by: FAMILY MEDICINE

## 2023-07-14 PROCEDURE — 1036F TOBACCO NON-USER: CPT | Performed by: FAMILY MEDICINE

## 2023-07-14 RX ORDER — HYDROXYZINE HYDROCHLORIDE 25 MG/1
25 TABLET, FILM COATED ORAL EVERY 8 HOURS PRN
Qty: 60 TABLET | Refills: 2 | Status: SHIPPED | OUTPATIENT
Start: 2023-07-14

## 2023-07-14 RX ORDER — OXYCODONE AND ACETAMINOPHEN 10; 325 MG/1; MG/1
1 TABLET ORAL EVERY 6 HOURS PRN
Qty: 100 TABLET | Refills: 0 | Status: SHIPPED | OUTPATIENT
Start: 2023-07-14 | End: 2023-08-13

## 2023-07-14 RX ORDER — ROPINIROLE 1 MG/1
1 TABLET, FILM COATED ORAL NIGHTLY
Qty: 90 TABLET | Refills: 1 | Status: SHIPPED | OUTPATIENT
Start: 2023-07-14

## 2023-07-14 NOTE — ASSESSMENT & PLAN NOTE
Continuing oxycodone 10 mg with 100 pills expected to last 30 days. Recommended that she have a backup plan in case if I were not able to prescribe it and she had difficulty finding another doctor who would.

## 2023-07-14 NOTE — ASSESSMENT & PLAN NOTE
Recommended foods with iron and trying to get the iron level up since that may reduce restless leg syndrome.

## 2023-07-14 NOTE — ASSESSMENT & PLAN NOTE
Explained that gustatory rhinitis is something that sometimes happens as people get older and that the process of eating causes discharge from the nose. An ear nose throat doctor may be able to do a procedure to reduce this but insurance likely would not cover it.

## 2023-08-11 ENCOUNTER — OFFICE VISIT (OUTPATIENT)
Dept: FAMILY MEDICINE CLINIC | Age: 78
End: 2023-08-11
Payer: MEDICARE

## 2023-08-11 VITALS
HEART RATE: 53 BPM | TEMPERATURE: 97.8 F | WEIGHT: 224.6 LBS | BODY MASS INDEX: 42.44 KG/M2 | DIASTOLIC BLOOD PRESSURE: 78 MMHG | OXYGEN SATURATION: 96 % | SYSTOLIC BLOOD PRESSURE: 134 MMHG

## 2023-08-11 DIAGNOSIS — M48.062 SPINAL STENOSIS OF LUMBAR REGION WITH NEUROGENIC CLAUDICATION: ICD-10-CM

## 2023-08-11 DIAGNOSIS — M54.41 CHRONIC BILATERAL LOW BACK PAIN WITH BILATERAL SCIATICA: Primary | Chronic | ICD-10-CM

## 2023-08-11 DIAGNOSIS — G89.29 CHRONIC BILATERAL LOW BACK PAIN WITH BILATERAL SCIATICA: Primary | Chronic | ICD-10-CM

## 2023-08-11 DIAGNOSIS — N18.31 STAGE 3A CHRONIC KIDNEY DISEASE (HCC): Chronic | ICD-10-CM

## 2023-08-11 DIAGNOSIS — F41.1 GAD (GENERALIZED ANXIETY DISORDER): ICD-10-CM

## 2023-08-11 DIAGNOSIS — J30.9 ALLERGIC RHINITIS, UNSPECIFIED SEASONALITY, UNSPECIFIED TRIGGER: ICD-10-CM

## 2023-08-11 DIAGNOSIS — Z98.84 STATUS POST GASTRIC BYPASS FOR OBESITY: ICD-10-CM

## 2023-08-11 DIAGNOSIS — M16.0 PRIMARY OSTEOARTHRITIS OF BOTH HIPS: ICD-10-CM

## 2023-08-11 DIAGNOSIS — M54.42 CHRONIC BILATERAL LOW BACK PAIN WITH BILATERAL SCIATICA: Primary | Chronic | ICD-10-CM

## 2023-08-11 DIAGNOSIS — K21.9 GASTROESOPHAGEAL REFLUX DISEASE WITHOUT ESOPHAGITIS: ICD-10-CM

## 2023-08-11 DIAGNOSIS — Z79.891 LONG TERM (CURRENT) USE OF OPIATE ANALGESIC: ICD-10-CM

## 2023-08-11 PROCEDURE — G8427 DOCREV CUR MEDS BY ELIG CLIN: HCPCS | Performed by: FAMILY MEDICINE

## 2023-08-11 PROCEDURE — 99214 OFFICE O/P EST MOD 30 MIN: CPT | Performed by: FAMILY MEDICINE

## 2023-08-11 PROCEDURE — G8417 CALC BMI ABV UP PARAM F/U: HCPCS | Performed by: FAMILY MEDICINE

## 2023-08-11 PROCEDURE — G8400 PT W/DXA NO RESULTS DOC: HCPCS | Performed by: FAMILY MEDICINE

## 2023-08-11 PROCEDURE — 36415 COLL VENOUS BLD VENIPUNCTURE: CPT | Performed by: FAMILY MEDICINE

## 2023-08-11 PROCEDURE — 1123F ACP DISCUSS/DSCN MKR DOCD: CPT | Performed by: FAMILY MEDICINE

## 2023-08-11 PROCEDURE — 1036F TOBACCO NON-USER: CPT | Performed by: FAMILY MEDICINE

## 2023-08-11 PROCEDURE — 1090F PRES/ABSN URINE INCON ASSESS: CPT | Performed by: FAMILY MEDICINE

## 2023-08-11 RX ORDER — OMEPRAZOLE 40 MG/1
40 CAPSULE, DELAYED RELEASE ORAL DAILY PRN
Qty: 90 CAPSULE | Refills: 1 | Status: SHIPPED | OUTPATIENT
Start: 2023-08-11

## 2023-08-11 RX ORDER — FLUTICASONE PROPIONATE 50 MCG
2 SPRAY, SUSPENSION (ML) NASAL DAILY
Qty: 16 G | Refills: 3 | Status: SHIPPED | OUTPATIENT
Start: 2023-08-11

## 2023-08-11 RX ORDER — OXYCODONE AND ACETAMINOPHEN 10; 325 MG/1; MG/1
1 TABLET ORAL EVERY 6 HOURS PRN
Qty: 100 TABLET | Refills: 0 | Status: SHIPPED | OUTPATIENT
Start: 2023-08-11 | End: 2023-09-12

## 2023-08-11 ASSESSMENT — ENCOUNTER SYMPTOMS
CONSTIPATION: 0
DIARRHEA: 0

## 2023-08-11 NOTE — ASSESSMENT & PLAN NOTE
Discussed risks of long-term PPI including decline of kidney function, infections such as pneumonia.   She states she will try to take the omeprazole every other day

## 2023-08-11 NOTE — PROGRESS NOTES
Thought content normal.         Judgment: Judgment normal.    Reviewed:    PDMP shows last oxycodone on 7/14/2023. ASSESSMENT/PLAN:    1. Back pain, Chronic bilateral low back pain with bilateral sciatica  Assessment & Plan:   Ms. Gurpreet Roman chronic back pain is stable and benefits from oxycodone 100 tablets/month. I will continue it and feel that the benefit exceeds the risk. I am glad she is getting up and moving around more which will help to reduce her risk of falling  2. Long term (current) use of opiate analgesic  Assessment & Plan:   Checking urine drug screen    Orders:  -     Urine Drug Screen  3. Spinal stenosis of lumbar region with neurogenic claudication  -     oxyCODONE-acetaminophen (PERCOCET)  MG per tablet; Take 1 tablet by mouth every 6 hours as needed for Pain for up to 32 days. Limit to three tabs per day on most days. Max Daily Amount: 4 tablets, Disp-100 tablet, R-0Normal  4. OA, Hips, Primary osteoarthritis of both hips  -     oxyCODONE-acetaminophen (PERCOCET)  MG per tablet; Take 1 tablet by mouth every 6 hours as needed for Pain for up to 32 days. Limit to three tabs per day on most days. Max Daily Amount: 4 tablets, Disp-100 tablet, R-0Normal  5. CHARLIE (generalized anxiety disorder)  Assessment & Plan:   She benefits significantly from sertraline for generalized anxiety disorder so I will continue it. Orders:  -     sertraline (ZOLOFT) 50 MG tablet; Take 1 tablet by mouth nightly, Disp-90 tablet, R-1Normal  6. Gastroesophageal reflux disease without esophagitis  Assessment & Plan:   Discussed risks of long-term PPI including decline of kidney function, infections such as pneumonia. She states she will try to take the omeprazole every other day  Orders:  -     omeprazole (PRILOSEC) 40 MG delayed release capsule; Take 1 capsule by mouth daily as needed (GI distress.), Disp-90 capsule, R-1Normal  7. CKD, Stage 3a chronic kidney disease ([address])  Assessment & Plan:    We will check

## 2023-08-11 NOTE — ASSESSMENT & PLAN NOTE
Ms. Marlene Iniguez chronic back pain is stable and benefits from oxycodone 100 tablets/month. I will continue it and feel that the benefit exceeds the risk.   I am glad she is getting up and moving around more which will help to reduce her risk of falling

## 2023-08-12 LAB
ALBUMIN SERPL-MCNC: 4.2 G/DL (ref 3.4–5)
AMPHETAMINES UR QL SCN>1000 NG/ML: ABNORMAL
ANION GAP SERPL CALCULATED.3IONS-SCNC: 15 MMOL/L (ref 3–16)
BARBITURATES UR QL SCN>200 NG/ML: ABNORMAL
BENZODIAZ UR QL SCN>200 NG/ML: ABNORMAL
BUN SERPL-MCNC: 21 MG/DL (ref 7–20)
CALCIUM SERPL-MCNC: 9.1 MG/DL (ref 8.3–10.6)
CANNABINOIDS UR QL SCN>50 NG/ML: ABNORMAL
CHLORIDE SERPL-SCNC: 104 MMOL/L (ref 99–110)
CO2 SERPL-SCNC: 22 MMOL/L (ref 21–32)
COCAINE UR QL SCN: ABNORMAL
CREAT SERPL-MCNC: 1 MG/DL (ref 0.6–1.2)
DRUG SCREEN COMMENT UR-IMP: ABNORMAL
FENTANYL SCREEN, URINE: ABNORMAL
GFR SERPLBLD CREATININE-BSD FMLA CKD-EPI: 57 ML/MIN/{1.73_M2}
GLUCOSE SERPL-MCNC: 100 MG/DL (ref 70–99)
METHADONE UR QL SCN>300 NG/ML: ABNORMAL
OPIATES UR QL SCN>300 NG/ML: ABNORMAL
OXYCODONE UR QL SCN: POSITIVE
PCP UR QL SCN>25 NG/ML: ABNORMAL
PH UR STRIP: 5.5 [PH]
PHOSPHATE SERPL-MCNC: 3.4 MG/DL (ref 2.5–4.9)
POTASSIUM SERPL-SCNC: 4.5 MMOL/L (ref 3.5–5.1)
SODIUM SERPL-SCNC: 141 MMOL/L (ref 136–145)

## 2023-09-13 ENCOUNTER — OFFICE VISIT (OUTPATIENT)
Dept: FAMILY MEDICINE CLINIC | Age: 78
End: 2023-09-13
Payer: MEDICAID

## 2023-09-13 VITALS
BODY MASS INDEX: 43.16 KG/M2 | TEMPERATURE: 97.2 F | DIASTOLIC BLOOD PRESSURE: 80 MMHG | OXYGEN SATURATION: 97 % | WEIGHT: 228.4 LBS | SYSTOLIC BLOOD PRESSURE: 122 MMHG | HEART RATE: 60 BPM

## 2023-09-13 DIAGNOSIS — E83.42 HYPOMAGNESEMIA: ICD-10-CM

## 2023-09-13 DIAGNOSIS — M48.062 SPINAL STENOSIS OF LUMBAR REGION WITH NEUROGENIC CLAUDICATION: ICD-10-CM

## 2023-09-13 DIAGNOSIS — M16.0 PRIMARY OSTEOARTHRITIS OF BOTH HIPS: ICD-10-CM

## 2023-09-13 DIAGNOSIS — G25.81 RLS (RESTLESS LEGS SYNDROME): ICD-10-CM

## 2023-09-13 DIAGNOSIS — F41.1 GAD (GENERALIZED ANXIETY DISORDER): ICD-10-CM

## 2023-09-13 PROCEDURE — 1036F TOBACCO NON-USER: CPT | Performed by: FAMILY MEDICINE

## 2023-09-13 PROCEDURE — G8427 DOCREV CUR MEDS BY ELIG CLIN: HCPCS | Performed by: FAMILY MEDICINE

## 2023-09-13 PROCEDURE — 1090F PRES/ABSN URINE INCON ASSESS: CPT | Performed by: FAMILY MEDICINE

## 2023-09-13 PROCEDURE — 99213 OFFICE O/P EST LOW 20 MIN: CPT | Performed by: FAMILY MEDICINE

## 2023-09-13 PROCEDURE — G8400 PT W/DXA NO RESULTS DOC: HCPCS | Performed by: FAMILY MEDICINE

## 2023-09-13 PROCEDURE — 1123F ACP DISCUSS/DSCN MKR DOCD: CPT | Performed by: FAMILY MEDICINE

## 2023-09-13 PROCEDURE — G8417 CALC BMI ABV UP PARAM F/U: HCPCS | Performed by: FAMILY MEDICINE

## 2023-09-13 RX ORDER — HYDROXYZINE HYDROCHLORIDE 25 MG/1
25 TABLET, FILM COATED ORAL EVERY 8 HOURS PRN
Qty: 60 TABLET | Refills: 2 | Status: SHIPPED | OUTPATIENT
Start: 2023-09-13

## 2023-09-13 RX ORDER — ROPINIROLE 1 MG/1
1 TABLET, FILM COATED ORAL NIGHTLY
Qty: 90 TABLET | Refills: 1 | Status: SHIPPED | OUTPATIENT
Start: 2023-09-13

## 2023-09-13 RX ORDER — CALCIUM CARBONATE 300MG(750)
1 TABLET,CHEWABLE ORAL DAILY
Qty: 90 TABLET | Refills: 1 | Status: SHIPPED | OUTPATIENT
Start: 2023-09-13

## 2023-09-13 RX ORDER — OXYCODONE AND ACETAMINOPHEN 10; 325 MG/1; MG/1
1 TABLET ORAL EVERY 6 HOURS PRN
Qty: 100 TABLET | Refills: 0 | Status: SHIPPED | OUTPATIENT
Start: 2023-09-13 | End: 2023-10-13

## 2023-09-13 ASSESSMENT — ENCOUNTER SYMPTOMS: DIARRHEA: 0

## 2023-09-13 NOTE — ASSESSMENT & PLAN NOTE
Clearly benefits from the oxycodone as demonstrated by her work emely a lot of vegetables over the last couple of months. I will prescribe the oxycodone again.

## 2023-10-13 ENCOUNTER — OFFICE VISIT (OUTPATIENT)
Dept: FAMILY MEDICINE CLINIC | Age: 78
End: 2023-10-13
Payer: COMMERCIAL

## 2023-10-13 VITALS
SYSTOLIC BLOOD PRESSURE: 138 MMHG | TEMPERATURE: 97.3 F | WEIGHT: 223 LBS | DIASTOLIC BLOOD PRESSURE: 66 MMHG | HEART RATE: 66 BPM | OXYGEN SATURATION: 97 % | BODY MASS INDEX: 42.14 KG/M2

## 2023-10-13 DIAGNOSIS — M54.42 CHRONIC BILATERAL LOW BACK PAIN WITH BILATERAL SCIATICA: Primary | Chronic | ICD-10-CM

## 2023-10-13 DIAGNOSIS — E83.42 HYPOMAGNESEMIA: ICD-10-CM

## 2023-10-13 DIAGNOSIS — G89.29 CHRONIC BILATERAL LOW BACK PAIN WITH BILATERAL SCIATICA: Primary | Chronic | ICD-10-CM

## 2023-10-13 DIAGNOSIS — E66.01 OBESITY, CLASS III, BMI 40-49.9 (MORBID OBESITY) (HCC): ICD-10-CM

## 2023-10-13 DIAGNOSIS — M54.41 CHRONIC BILATERAL LOW BACK PAIN WITH BILATERAL SCIATICA: Primary | Chronic | ICD-10-CM

## 2023-10-13 DIAGNOSIS — J30.9 ALLERGIC RHINITIS, UNSPECIFIED SEASONALITY, UNSPECIFIED TRIGGER: ICD-10-CM

## 2023-10-13 DIAGNOSIS — F41.1 GAD (GENERALIZED ANXIETY DISORDER): ICD-10-CM

## 2023-10-13 DIAGNOSIS — G25.81 RLS (RESTLESS LEGS SYNDROME): ICD-10-CM

## 2023-10-13 DIAGNOSIS — N18.31 STAGE 3A CHRONIC KIDNEY DISEASE (HCC): Chronic | ICD-10-CM

## 2023-10-13 DIAGNOSIS — K21.9 GASTROESOPHAGEAL REFLUX DISEASE WITHOUT ESOPHAGITIS: ICD-10-CM

## 2023-10-13 DIAGNOSIS — M48.062 SPINAL STENOSIS OF LUMBAR REGION WITH NEUROGENIC CLAUDICATION: ICD-10-CM

## 2023-10-13 DIAGNOSIS — M16.0 PRIMARY OSTEOARTHRITIS OF BOTH HIPS: ICD-10-CM

## 2023-10-13 DIAGNOSIS — J45.30 MILD PERSISTENT ASTHMA WITHOUT COMPLICATION: ICD-10-CM

## 2023-10-13 PROBLEM — E66.813 OBESITY, CLASS III, BMI 40-49.9 (MORBID OBESITY): Chronic | Status: ACTIVE | Noted: 2017-05-31

## 2023-10-13 PROCEDURE — 1123F ACP DISCUSS/DSCN MKR DOCD: CPT | Performed by: FAMILY MEDICINE

## 2023-10-13 PROCEDURE — G8428 CUR MEDS NOT DOCUMENT: HCPCS | Performed by: FAMILY MEDICINE

## 2023-10-13 PROCEDURE — G8417 CALC BMI ABV UP PARAM F/U: HCPCS | Performed by: FAMILY MEDICINE

## 2023-10-13 PROCEDURE — 99214 OFFICE O/P EST MOD 30 MIN: CPT | Performed by: FAMILY MEDICINE

## 2023-10-13 PROCEDURE — G8484 FLU IMMUNIZE NO ADMIN: HCPCS | Performed by: FAMILY MEDICINE

## 2023-10-13 PROCEDURE — 1036F TOBACCO NON-USER: CPT | Performed by: FAMILY MEDICINE

## 2023-10-13 PROCEDURE — 1090F PRES/ABSN URINE INCON ASSESS: CPT | Performed by: FAMILY MEDICINE

## 2023-10-13 PROCEDURE — G8400 PT W/DXA NO RESULTS DOC: HCPCS | Performed by: FAMILY MEDICINE

## 2023-10-13 RX ORDER — ROPINIROLE 2 MG/1
2 TABLET, FILM COATED ORAL NIGHTLY
Qty: 90 TABLET | Refills: 1 | Status: SHIPPED | OUTPATIENT
Start: 2023-10-13

## 2023-10-13 RX ORDER — FLUTICASONE FUROATE AND VILANTEROL 100; 25 UG/1; UG/1
1 POWDER RESPIRATORY (INHALATION) DAILY
Qty: 3 EACH | Refills: 3 | Status: SHIPPED | OUTPATIENT
Start: 2023-10-13

## 2023-10-13 RX ORDER — FLUTICASONE PROPIONATE 50 MCG
2 SPRAY, SUSPENSION (ML) NASAL DAILY
Qty: 16 G | Refills: 3 | Status: SHIPPED | OUTPATIENT
Start: 2023-10-13

## 2023-10-13 RX ORDER — OXYCODONE AND ACETAMINOPHEN 10; 325 MG/1; MG/1
1 TABLET ORAL EVERY 6 HOURS PRN
Qty: 100 TABLET | Refills: 0 | Status: SHIPPED | OUTPATIENT
Start: 2023-10-13 | End: 2023-11-12

## 2023-10-13 RX ORDER — MAGNESIUM OXIDE TAB 400 MG (241.3 MG ELEMENTAL MG) 400 (241.3 MG) MG
400 TAB ORAL DAILY
Qty: 90 TABLET | Refills: 1 | Status: SHIPPED | OUTPATIENT
Start: 2023-10-13

## 2023-10-13 RX ORDER — HYDROXYZINE PAMOATE 25 MG/1
25 CAPSULE ORAL 3 TIMES DAILY PRN
Qty: 90 CAPSULE | Refills: 2 | Status: SHIPPED | OUTPATIENT
Start: 2023-10-13

## 2023-10-13 RX ORDER — FAMOTIDINE 20 MG/1
20 TABLET, FILM COATED ORAL 2 TIMES DAILY
Qty: 180 TABLET | Refills: 1 | Status: SHIPPED | OUTPATIENT
Start: 2023-10-13

## 2023-10-13 RX ORDER — OMEPRAZOLE 40 MG/1
40 CAPSULE, DELAYED RELEASE ORAL DAILY PRN
Qty: 90 CAPSULE | Refills: 1 | Status: SHIPPED | OUTPATIENT
Start: 2023-10-13

## 2023-10-13 ASSESSMENT — ENCOUNTER SYMPTOMS
ANAL BLEEDING: 0
BLOOD IN STOOL: 0
NAUSEA: 0
CONSTIPATION: 0
VOMITING: 0
ABDOMINAL PAIN: 0
DIARRHEA: 0

## 2023-10-13 NOTE — ASSESSMENT & PLAN NOTE
Her restless leg syndrome are not adequately controlled. I encouraged her to consume foods with iron. I will increase the ropinirole to 2 mg daily.

## 2023-10-13 NOTE — ASSESSMENT & PLAN NOTE
She did not want to get a kidney blood test done today.   I think it is reasonable to wait until a future appointment in 1-2 m

## 2023-10-13 NOTE — ASSESSMENT & PLAN NOTE
Her back pain is controlled adequately with oxycodone. I will continue to prescribe it at the same dose. She is stable. I think the benefit exceeds the risk.

## 2023-10-13 NOTE — ASSESSMENT & PLAN NOTE
To reduce the risk of osteoporosis or worsening CKD or pneumonia or C. difficile or vitamin B12 deficiency she will try taking famotidine instead of omeprazole on some days.

## 2023-10-13 NOTE — ASSESSMENT & PLAN NOTE
She is making efforts to lose weight which is excellent. Weight loss may help to reduce her chronic pains.

## 2023-10-15 DIAGNOSIS — K21.9 GASTROESOPHAGEAL REFLUX DISEASE WITHOUT ESOPHAGITIS: ICD-10-CM

## 2023-10-16 RX ORDER — OMEPRAZOLE 40 MG/1
40 CAPSULE, DELAYED RELEASE ORAL DAILY
Qty: 90 CAPSULE | Refills: 1 | OUTPATIENT
Start: 2023-10-16

## 2023-11-13 ENCOUNTER — OFFICE VISIT (OUTPATIENT)
Dept: FAMILY MEDICINE CLINIC | Age: 78
End: 2023-11-13
Payer: COMMERCIAL

## 2023-11-13 VITALS
TEMPERATURE: 97.8 F | HEIGHT: 61 IN | SYSTOLIC BLOOD PRESSURE: 115 MMHG | BODY MASS INDEX: 42.29 KG/M2 | WEIGHT: 224 LBS | DIASTOLIC BLOOD PRESSURE: 64 MMHG | OXYGEN SATURATION: 96 % | HEART RATE: 51 BPM

## 2023-11-13 DIAGNOSIS — G89.29 CHRONIC BILATERAL LOW BACK PAIN WITH BILATERAL SCIATICA: Primary | Chronic | ICD-10-CM

## 2023-11-13 DIAGNOSIS — R94.4 DECREASED GFR: ICD-10-CM

## 2023-11-13 DIAGNOSIS — E66.01 OBESITY, CLASS III, BMI 40-49.9 (MORBID OBESITY) (HCC): Chronic | ICD-10-CM

## 2023-11-13 DIAGNOSIS — M16.0 PRIMARY OSTEOARTHRITIS OF BOTH HIPS: ICD-10-CM

## 2023-11-13 DIAGNOSIS — M54.41 CHRONIC BILATERAL LOW BACK PAIN WITH BILATERAL SCIATICA: Primary | Chronic | ICD-10-CM

## 2023-11-13 DIAGNOSIS — M54.42 CHRONIC BILATERAL LOW BACK PAIN WITH BILATERAL SCIATICA: Primary | Chronic | ICD-10-CM

## 2023-11-13 DIAGNOSIS — N18.31 STAGE 3A CHRONIC KIDNEY DISEASE (HCC): Chronic | ICD-10-CM

## 2023-11-13 DIAGNOSIS — M48.062 SPINAL STENOSIS OF LUMBAR REGION WITH NEUROGENIC CLAUDICATION: ICD-10-CM

## 2023-11-13 PROCEDURE — 99213 OFFICE O/P EST LOW 20 MIN: CPT | Performed by: FAMILY MEDICINE

## 2023-11-13 PROCEDURE — 1123F ACP DISCUSS/DSCN MKR DOCD: CPT | Performed by: FAMILY MEDICINE

## 2023-11-13 PROCEDURE — G8400 PT W/DXA NO RESULTS DOC: HCPCS | Performed by: FAMILY MEDICINE

## 2023-11-13 PROCEDURE — 1036F TOBACCO NON-USER: CPT | Performed by: FAMILY MEDICINE

## 2023-11-13 PROCEDURE — G8427 DOCREV CUR MEDS BY ELIG CLIN: HCPCS | Performed by: FAMILY MEDICINE

## 2023-11-13 PROCEDURE — 1090F PRES/ABSN URINE INCON ASSESS: CPT | Performed by: FAMILY MEDICINE

## 2023-11-13 PROCEDURE — G8484 FLU IMMUNIZE NO ADMIN: HCPCS | Performed by: FAMILY MEDICINE

## 2023-11-13 PROCEDURE — 36415 COLL VENOUS BLD VENIPUNCTURE: CPT | Performed by: FAMILY MEDICINE

## 2023-11-13 PROCEDURE — G8417 CALC BMI ABV UP PARAM F/U: HCPCS | Performed by: FAMILY MEDICINE

## 2023-11-13 RX ORDER — OXYCODONE AND ACETAMINOPHEN 10; 325 MG/1; MG/1
1 TABLET ORAL EVERY 6 HOURS PRN
Qty: 100 TABLET | Refills: 0 | Status: SHIPPED | OUTPATIENT
Start: 2023-11-13 | End: 2023-12-13

## 2023-11-13 ASSESSMENT — ENCOUNTER SYMPTOMS
CONSTIPATION: 0
WHEEZING: 0
SHORTNESS OF BREATH: 0

## 2023-11-13 NOTE — ASSESSMENT & PLAN NOTE
Her chronic back pain is well controlled. I think that at age 66 the benefit of oxycodone exceeds the risk. She does not seem to have any problem with falling. I do not think that in her case there would be benefit to trying to reduce the oxycodone dose at this time.

## 2023-11-14 LAB
ALBUMIN SERPL-MCNC: 4 G/DL (ref 3.4–5)
ALBUMIN/GLOB SERPL: 1.5 {RATIO} (ref 1.1–2.2)
ALP SERPL-CCNC: 115 U/L (ref 40–129)
ALT SERPL-CCNC: 11 U/L (ref 10–40)
ANION GAP SERPL CALCULATED.3IONS-SCNC: 11 MMOL/L (ref 3–16)
AST SERPL-CCNC: 16 U/L (ref 15–37)
BILIRUB SERPL-MCNC: <0.2 MG/DL (ref 0–1)
BUN SERPL-MCNC: 24 MG/DL (ref 7–20)
CALCIUM SERPL-MCNC: 9.1 MG/DL (ref 8.3–10.6)
CHLORIDE SERPL-SCNC: 106 MMOL/L (ref 99–110)
CO2 SERPL-SCNC: 24 MMOL/L (ref 21–32)
CREAT SERPL-MCNC: 1.3 MG/DL (ref 0.6–1.2)
GFR SERPLBLD CREATININE-BSD FMLA CKD-EPI: 42 ML/MIN/{1.73_M2}
GLUCOSE SERPL-MCNC: 117 MG/DL (ref 70–99)
POTASSIUM SERPL-SCNC: 4.6 MMOL/L (ref 3.5–5.1)
PROT SERPL-MCNC: 6.6 G/DL (ref 6.4–8.2)
SODIUM SERPL-SCNC: 141 MMOL/L (ref 136–145)

## 2023-12-13 ENCOUNTER — OFFICE VISIT (OUTPATIENT)
Dept: FAMILY MEDICINE CLINIC | Age: 78
End: 2023-12-13
Payer: COMMERCIAL

## 2023-12-13 VITALS
HEART RATE: 58 BPM | DIASTOLIC BLOOD PRESSURE: 64 MMHG | BODY MASS INDEX: 40.12 KG/M2 | HEIGHT: 62 IN | TEMPERATURE: 97.8 F | SYSTOLIC BLOOD PRESSURE: 124 MMHG | OXYGEN SATURATION: 95 % | WEIGHT: 218 LBS

## 2023-12-13 DIAGNOSIS — M48.062 SPINAL STENOSIS OF LUMBAR REGION WITH NEUROGENIC CLAUDICATION: ICD-10-CM

## 2023-12-13 DIAGNOSIS — Z00.00 MEDICARE ANNUAL WELLNESS VISIT, SUBSEQUENT: Primary | ICD-10-CM

## 2023-12-13 DIAGNOSIS — M16.0 PRIMARY OSTEOARTHRITIS OF BOTH HIPS: ICD-10-CM

## 2023-12-13 PROCEDURE — 1036F TOBACCO NON-USER: CPT | Performed by: FAMILY MEDICINE

## 2023-12-13 PROCEDURE — G8400 PT W/DXA NO RESULTS DOC: HCPCS | Performed by: FAMILY MEDICINE

## 2023-12-13 PROCEDURE — 99213 OFFICE O/P EST LOW 20 MIN: CPT | Performed by: FAMILY MEDICINE

## 2023-12-13 PROCEDURE — G8417 CALC BMI ABV UP PARAM F/U: HCPCS | Performed by: FAMILY MEDICINE

## 2023-12-13 PROCEDURE — 1123F ACP DISCUSS/DSCN MKR DOCD: CPT | Performed by: FAMILY MEDICINE

## 2023-12-13 PROCEDURE — G8427 DOCREV CUR MEDS BY ELIG CLIN: HCPCS | Performed by: FAMILY MEDICINE

## 2023-12-13 PROCEDURE — G0439 PPPS, SUBSEQ VISIT: HCPCS | Performed by: FAMILY MEDICINE

## 2023-12-13 PROCEDURE — G8484 FLU IMMUNIZE NO ADMIN: HCPCS | Performed by: FAMILY MEDICINE

## 2023-12-13 PROCEDURE — 1090F PRES/ABSN URINE INCON ASSESS: CPT | Performed by: FAMILY MEDICINE

## 2023-12-13 RX ORDER — OXYCODONE AND ACETAMINOPHEN 10; 325 MG/1; MG/1
1 TABLET ORAL EVERY 6 HOURS PRN
Qty: 100 TABLET | Refills: 0 | Status: SHIPPED | OUTPATIENT
Start: 2023-12-13 | End: 2024-01-12

## 2023-12-13 RX ORDER — OXYCODONE AND ACETAMINOPHEN 10; 325 MG/1; MG/1
1 TABLET ORAL EVERY 6 HOURS PRN
Qty: 100 TABLET | Refills: 0 | Status: SHIPPED | OUTPATIENT
Start: 2024-01-12 | End: 2024-02-11

## 2023-12-13 ASSESSMENT — VISUAL ACUITY
OS_CC: 20/40-2
OD_CC: 20/30

## 2023-12-13 ASSESSMENT — PATIENT HEALTH QUESTIONNAIRE - PHQ9
4. FEELING TIRED OR HAVING LITTLE ENERGY: 0
9. THOUGHTS THAT YOU WOULD BE BETTER OFF DEAD, OR OF HURTING YOURSELF: 0
SUM OF ALL RESPONSES TO PHQ9 QUESTIONS 1 & 2: 0
8. MOVING OR SPEAKING SO SLOWLY THAT OTHER PEOPLE COULD HAVE NOTICED. OR THE OPPOSITE, BEING SO FIGETY OR RESTLESS THAT YOU HAVE BEEN MOVING AROUND A LOT MORE THAN USUAL: 0
SUM OF ALL RESPONSES TO PHQ QUESTIONS 1-9: 0
2. FEELING DOWN, DEPRESSED OR HOPELESS: 0
6. FEELING BAD ABOUT YOURSELF - OR THAT YOU ARE A FAILURE OR HAVE LET YOURSELF OR YOUR FAMILY DOWN: 0
SUM OF ALL RESPONSES TO PHQ QUESTIONS 1-9: 0
1. LITTLE INTEREST OR PLEASURE IN DOING THINGS: 0
DEPRESSION UNABLE TO ASSESS: PT REFUSES
SUM OF ALL RESPONSES TO PHQ QUESTIONS 1-9: 0
10. IF YOU CHECKED OFF ANY PROBLEMS, HOW DIFFICULT HAVE THESE PROBLEMS MADE IT FOR YOU TO DO YOUR WORK, TAKE CARE OF THINGS AT HOME, OR GET ALONG WITH OTHER PEOPLE: 0
7. TROUBLE CONCENTRATING ON THINGS, SUCH AS READING THE NEWSPAPER OR WATCHING TELEVISION: 0
3. TROUBLE FALLING OR STAYING ASLEEP: 0
SUM OF ALL RESPONSES TO PHQ QUESTIONS 1-9: 0
5. POOR APPETITE OR OVEREATING: 0

## 2023-12-13 ASSESSMENT — LIFESTYLE VARIABLES
HOW MANY STANDARD DRINKS CONTAINING ALCOHOL DO YOU HAVE ON A TYPICAL DAY: PATIENT DOES NOT DRINK
HOW OFTEN DO YOU HAVE A DRINK CONTAINING ALCOHOL: NEVER

## 2023-12-13 ASSESSMENT — ENCOUNTER SYMPTOMS
CONSTIPATION: 0
DIARRHEA: 0

## 2023-12-13 ASSESSMENT — COLUMBIA-SUICIDE SEVERITY RATING SCALE - C-SSRS
5. HAVE YOU STARTED TO WORK OUT OR WORKED OUT THE DETAILS OF HOW TO KILL YOURSELF? DO YOU INTEND TO CARRY OUT THIS PLAN?: NO
7. DID THIS OCCUR IN THE LAST THREE MONTHS: NO
3. HAVE YOU BEEN THINKING ABOUT HOW YOU MIGHT KILL YOURSELF?: NO
4. HAVE YOU HAD THESE THOUGHTS AND HAD SOME INTENTION OF ACTING ON THEM?: NO

## 2023-12-13 NOTE — ASSESSMENT & PLAN NOTE
I think the benefit of oxycodone exceeds the risk. She has taken it of a long duration. Since she will be moving I will prescribe 2 months supply of oxycodone. I will put out 1 prescription for the upcoming months and send a second prescription of oxycodone at the same dosage to the [facility] near where she will be moving to in [place].

## 2024-01-01 NOTE — LETTER
[facility] - NICU  Discharge Reassessment    Primary Care Provider: Dr. Rigoberto Black    Expected Discharge Date: 2024    Reassessment (most recent)       Discharge Reassessment - 06/12/24 0926          Discharge Reassessment    Assessment Type Discharge Planning Reassessment     Did the patient's condition or plan change since previous assessment? No     Discharge Plan discussed with: --   Update Nurse to     Communicated THALIA with patient/caregiver Other (see comments)     Discharge Plan A Home with family     Discharge Plan B Early Steps     DME Needed Upon Discharge  none     Transition of Care Barriers None     Why the patient remains in the hospital Requires continued medical care        Post-Acute Status    Discharge Delays None known at this time                 This patient has been screened for Case Management needs.  Based on (documentation in medical record/communication with nursing), patient's care in NICU continues.  Patient with episode(s) of bradychardia- not yet ready for discharge.    Case Management/Social Work remains available if a need arises, please enter consult for assistance.  For urgent needs contact Case Management Department/on-call at:  [phone]         supplements and oral decongestants. Dependence withdrawal symptoms may include depressed mood, loss of interest, suicidal thoughts, anxiety, fatigue, appetite changes and agitation. Testosterone replacement therapy:  Potential side effects include increased risk of stroke and heart attack, blood clots, increased blood pressure, increased cholesterol, enlarged prostate, sleep apnea, irritability/aggression and other mood disorders, and decreased fertility. Other:     1. I understand that I have the following responsibilities:  · I will take medications at the dose and frequency prescribed. · I will not increase or change how I take my medications without the approval of the health care provider who signs this Medication Agreement. · I will arrange for refills at the prescribed interval ONLY during regular office hours. I will not ask for refills earlier than agreed, after-hours, on holidays or on weekends. · I will obtain all refills for these medications at  ·  ____[facility]______________  pharmacy (phone number  ·  ________________________), with full consent for my provider and pharmacist to exchange information in writing or verbally. · I will not request any pain medications or controlled substances from other providers and will inform this provider of all other medications I am taking. · I will inform my other health care providers that I am taking these medications and of the existence of this [id number]. In the event of an emergency, I will provide the same information to the emergency department providers. · I will protect my prescriptions and medications. I understand that lost or misplaced prescriptions will not be replaced. · I will keep medications only for my own use and will not share them with others. I will keep all medications away from children. · I agree to participate in any medical, psychological or psychiatric assessments recommended by my provider.

## 2024-01-09 ASSESSMENT — PATIENT HEALTH QUESTIONNAIRE - PHQ9
3. TROUBLE FALLING OR STAYING ASLEEP: 1
SUM OF ALL RESPONSES TO PHQ QUESTIONS 1-9: 4
1. LITTLE INTEREST OR PLEASURE IN DOING THINGS: NOT AT ALL
8. MOVING OR SPEAKING SO SLOWLY THAT OTHER PEOPLE COULD HAVE NOTICED. OR THE OPPOSITE - BEING SO FIDGETY OR RESTLESS THAT YOU HAVE BEEN MOVING AROUND A LOT MORE THAN USUAL: NOT AT ALL
7. TROUBLE CONCENTRATING ON THINGS, SUCH AS READING THE NEWSPAPER OR WATCHING TELEVISION: 0
2. FEELING DOWN, DEPRESSED OR HOPELESS: SEVERAL DAYS
SUM OF ALL RESPONSES TO PHQ QUESTIONS 1-9: 4
8. MOVING OR SPEAKING SO SLOWLY THAT OTHER PEOPLE COULD HAVE NOTICED. OR THE OPPOSITE, BEING SO FIGETY OR RESTLESS THAT YOU HAVE BEEN MOVING AROUND A LOT MORE THAN USUAL: 0
4. FEELING TIRED OR HAVING LITTLE ENERGY: SEVERAL DAYS
SUM OF ALL RESPONSES TO PHQ QUESTIONS 1-9: 4
4. FEELING TIRED OR HAVING LITTLE ENERGY: 1
SUM OF ALL RESPONSES TO PHQ9 QUESTIONS 1 & 2: 1
2. FEELING DOWN, DEPRESSED OR HOPELESS: 1
SUM OF ALL RESPONSES TO PHQ QUESTIONS 1-9: 4
3. TROUBLE FALLING OR STAYING ASLEEP: SEVERAL DAYS
1. LITTLE INTEREST OR PLEASURE IN DOING THINGS: 0
7. TROUBLE CONCENTRATING ON THINGS, SUCH AS READING THE NEWSPAPER OR WATCHING TELEVISION: NOT AT ALL
6. FEELING BAD ABOUT YOURSELF - OR THAT YOU ARE A FAILURE OR HAVE LET YOURSELF OR YOUR FAMILY DOWN: 0
9. THOUGHTS THAT YOU WOULD BE BETTER OFF DEAD, OR OF HURTING YOURSELF: NOT AT ALL
10. IF YOU CHECKED OFF ANY PROBLEMS, HOW DIFFICULT HAVE THESE PROBLEMS MADE IT FOR YOU TO DO YOUR WORK, TAKE CARE OF THINGS AT HOME, OR GET ALONG WITH OTHER PEOPLE: 1
10. IF YOU CHECKED OFF ANY PROBLEMS, HOW DIFFICULT HAVE THESE PROBLEMS MADE IT FOR YOU TO DO YOUR WORK, TAKE CARE OF THINGS AT HOME, OR GET ALONG WITH OTHER PEOPLE: SOMEWHAT DIFFICULT
5. POOR APPETITE OR OVEREATING: SEVERAL DAYS
SUM OF ALL RESPONSES TO PHQ QUESTIONS 1-9: 4
6. FEELING BAD ABOUT YOURSELF - OR THAT YOU ARE A FAILURE OR HAVE LET YOURSELF OR YOUR FAMILY DOWN: NOT AT ALL
5. POOR APPETITE OR OVEREATING: 1
9. THOUGHTS THAT YOU WOULD BE BETTER OFF DEAD, OR OF HURTING YOURSELF: 0

## 2024-01-12 ENCOUNTER — OFFICE VISIT (OUTPATIENT)
Dept: FAMILY MEDICINE CLINIC | Age: 79
End: 2024-01-12
Payer: COMMERCIAL

## 2024-01-12 VITALS
TEMPERATURE: 98.4 F | BODY MASS INDEX: 40.85 KG/M2 | SYSTOLIC BLOOD PRESSURE: 120 MMHG | OXYGEN SATURATION: 98 % | WEIGHT: 222 LBS | HEIGHT: 62 IN | HEART RATE: 69 BPM | DIASTOLIC BLOOD PRESSURE: 80 MMHG

## 2024-01-12 DIAGNOSIS — F41.1 GAD (GENERALIZED ANXIETY DISORDER): Chronic | ICD-10-CM

## 2024-01-12 DIAGNOSIS — E66.01 OBESITY, CLASS III, BMI 40-49.9 (MORBID OBESITY) (HCC): Chronic | ICD-10-CM

## 2024-01-12 DIAGNOSIS — Z63.6 CAREGIVER STRESS: ICD-10-CM

## 2024-01-12 DIAGNOSIS — M16.0 PRIMARY OSTEOARTHRITIS OF BOTH HIPS: ICD-10-CM

## 2024-01-12 DIAGNOSIS — M54.41 CHRONIC BILATERAL LOW BACK PAIN WITH BILATERAL SCIATICA: Primary | Chronic | ICD-10-CM

## 2024-01-12 DIAGNOSIS — G89.29 CHRONIC BILATERAL LOW BACK PAIN WITH BILATERAL SCIATICA: Primary | Chronic | ICD-10-CM

## 2024-01-12 DIAGNOSIS — M54.42 CHRONIC BILATERAL LOW BACK PAIN WITH BILATERAL SCIATICA: Primary | Chronic | ICD-10-CM

## 2024-01-12 DIAGNOSIS — M48.062 SPINAL STENOSIS OF LUMBAR REGION WITH NEUROGENIC CLAUDICATION: ICD-10-CM

## 2024-01-12 PROBLEM — R07.9 CHEST PAIN WITH HIGH RISK OF ACUTE CORONARY SYNDROME: Status: RESOLVED | Noted: 2018-04-11 | Resolved: 2024-01-12

## 2024-01-12 PROCEDURE — 1090F PRES/ABSN URINE INCON ASSESS: CPT | Performed by: FAMILY MEDICINE

## 2024-01-12 PROCEDURE — 1123F ACP DISCUSS/DSCN MKR DOCD: CPT | Performed by: FAMILY MEDICINE

## 2024-01-12 PROCEDURE — 1036F TOBACCO NON-USER: CPT | Performed by: FAMILY MEDICINE

## 2024-01-12 PROCEDURE — G8417 CALC BMI ABV UP PARAM F/U: HCPCS | Performed by: FAMILY MEDICINE

## 2024-01-12 PROCEDURE — 99213 OFFICE O/P EST LOW 20 MIN: CPT | Performed by: FAMILY MEDICINE

## 2024-01-12 PROCEDURE — G8400 PT W/DXA NO RESULTS DOC: HCPCS | Performed by: FAMILY MEDICINE

## 2024-01-12 PROCEDURE — G8484 FLU IMMUNIZE NO ADMIN: HCPCS | Performed by: FAMILY MEDICINE

## 2024-01-12 PROCEDURE — G8427 DOCREV CUR MEDS BY ELIG CLIN: HCPCS | Performed by: FAMILY MEDICINE

## 2024-01-12 RX ORDER — HYDROXYZINE PAMOATE 25 MG/1
25 CAPSULE ORAL 3 TIMES DAILY PRN
Qty: 90 CAPSULE | Refills: 2 | Status: SHIPPED | OUTPATIENT
Start: 2024-01-12

## 2024-01-12 RX ORDER — OXYCODONE AND ACETAMINOPHEN 10; 325 MG/1; MG/1
1 TABLET ORAL EVERY 6 HOURS PRN
Qty: 100 TABLET | Refills: 0 | Status: SHIPPED | OUTPATIENT
Start: 2024-01-12 | End: 2024-02-11

## 2024-01-12 SDOH — SOCIAL STABILITY - SOCIAL INSECURITY: DEPENDENT RELATIVE NEEDING CARE AT HOME: Z63.6

## 2024-01-12 ASSESSMENT — ENCOUNTER SYMPTOMS: BACK PAIN: 1

## 2024-01-12 NOTE — ASSESSMENT & PLAN NOTE
I informed her she can take the hydroxyzine both at night and during the day if needed for anxiety.

## 2024-01-12 NOTE — ASSESSMENT & PLAN NOTE
Her back pain is helped with Percocet that she has taken for a long duration.  I will continue prescription for the same dose.

## 2024-01-12 NOTE — PROGRESS NOTES
1/12/24    Nereyda Reynaga  1945    SUBJECTIVE    HPI - Nereyda is a 78 y.o. female who presents today for evaluation of:  Chief Complaint   Patient presents with    Follow-up     Pain      Pain : hurts real badly. In part it is due to stress and also due to the weather. She crochets to help with anxiety.. Hip pain is worse sleepiong on step mother's couch.     Obesity : \"I lost 6# last month.\"     Review of Systems   Musculoskeletal:  Positive for arthralgias and back pain.   Psychiatric/Behavioral:  The patient is nervous/anxious.          Allergies   Allergen Reactions    Latex     Prednisone Hives        OBJECTIVE    /80 (Site: Left Upper Arm, Position: Sitting, Cuff Size: Large Adult)   Pulse 69   Temp 98.4 °F (36.9 °C) (Infrared)   Ht 1.575 m (5' 2\")   Wt 100.7 kg (222 lb)   SpO2 98%   BMI 40.60 kg/m²     Physical Exam   Constitutional:       General: Not in acute distress.     Appearance: Normal appearance. Not ill-appearing.   Eyes:      General: No scleral icterus.  Cardiovascular:      Rate and Rhythm: Normal rate and regular rhythm.      Heart sounds: No murmur heard.   No friction rub. No gallop.    Pulmonary:      Effort: Pulmonary effort is normal. No respiratory distress.      Breath sounds: No wheezing, rhonchi or rales.   Abdominal:      Palpations: Abdomen is soft. There is no mass.      Tenderness: There is no abdominal tenderness.   Musculoskeletal:     Moves all extremities normally.    Skin:     General: Skin is warm.      Coloration: Skin is not jaundiced.   Neurological:      Mental Status: Patient is alert.   Psychiatric:         Behavior: Behavior normal.         Thought Content: Thought content normal.         Judgment: Judgment normal.    Reviewed:  pdmp shows last oxycodone  #100 on 12/13/23.      ASSESSMENT/PLAN:    1. Back pain, Chronic bilateral low back pain with bilateral sciatica  Assessment & Plan:   Her back pain is helped with Percocet that she has taken for a

## 2024-01-29 NOTE — TELEPHONE ENCOUNTER
Patient called stating she is still having the sciatic pain. She stated the gabapentin works a little but wears off fast. She is requesting something stronger. Normal for race

## 2024-02-12 ENCOUNTER — OFFICE VISIT (OUTPATIENT)
Dept: FAMILY MEDICINE CLINIC | Age: 79
End: 2024-02-12
Payer: COMMERCIAL

## 2024-02-12 VITALS
SYSTOLIC BLOOD PRESSURE: 118 MMHG | BODY MASS INDEX: 40.42 KG/M2 | WEIGHT: 221 LBS | HEART RATE: 56 BPM | TEMPERATURE: 97.7 F | OXYGEN SATURATION: 98 % | DIASTOLIC BLOOD PRESSURE: 80 MMHG

## 2024-02-12 DIAGNOSIS — G89.29 CHRONIC BILATERAL LOW BACK PAIN WITH BILATERAL SCIATICA: Chronic | ICD-10-CM

## 2024-02-12 DIAGNOSIS — M54.42 CHRONIC BILATERAL LOW BACK PAIN WITH BILATERAL SCIATICA: Chronic | ICD-10-CM

## 2024-02-12 DIAGNOSIS — G25.81 RLS (RESTLESS LEGS SYNDROME): ICD-10-CM

## 2024-02-12 DIAGNOSIS — M48.062 SPINAL STENOSIS OF LUMBAR REGION WITH NEUROGENIC CLAUDICATION: Primary | ICD-10-CM

## 2024-02-12 DIAGNOSIS — E66.01 OBESITY, CLASS III, BMI 40-49.9 (MORBID OBESITY) (HCC): Chronic | ICD-10-CM

## 2024-02-12 DIAGNOSIS — M54.41 CHRONIC BILATERAL LOW BACK PAIN WITH BILATERAL SCIATICA: Chronic | ICD-10-CM

## 2024-02-12 DIAGNOSIS — M16.0 PRIMARY OSTEOARTHRITIS OF BOTH HIPS: ICD-10-CM

## 2024-02-12 PROCEDURE — 99213 OFFICE O/P EST LOW 20 MIN: CPT | Performed by: FAMILY MEDICINE

## 2024-02-12 PROCEDURE — 1090F PRES/ABSN URINE INCON ASSESS: CPT | Performed by: FAMILY MEDICINE

## 2024-02-12 PROCEDURE — 1123F ACP DISCUSS/DSCN MKR DOCD: CPT | Performed by: FAMILY MEDICINE

## 2024-02-12 PROCEDURE — G8417 CALC BMI ABV UP PARAM F/U: HCPCS | Performed by: FAMILY MEDICINE

## 2024-02-12 PROCEDURE — G8484 FLU IMMUNIZE NO ADMIN: HCPCS | Performed by: FAMILY MEDICINE

## 2024-02-12 PROCEDURE — 1036F TOBACCO NON-USER: CPT | Performed by: FAMILY MEDICINE

## 2024-02-12 PROCEDURE — G8400 PT W/DXA NO RESULTS DOC: HCPCS | Performed by: FAMILY MEDICINE

## 2024-02-12 PROCEDURE — G8427 DOCREV CUR MEDS BY ELIG CLIN: HCPCS | Performed by: FAMILY MEDICINE

## 2024-02-12 RX ORDER — OXYCODONE AND ACETAMINOPHEN 10; 325 MG/1; MG/1
1 TABLET ORAL EVERY 6 HOURS PRN
Qty: 100 TABLET | Refills: 0 | Status: SHIPPED | OUTPATIENT
Start: 2024-02-12 | End: 2024-03-13

## 2024-02-12 RX ORDER — OXYCODONE AND ACETAMINOPHEN 10; 325 MG/1; MG/1
1 TABLET ORAL EVERY 6 HOURS PRN
Qty: 100 TABLET | Refills: 0 | Status: SHIPPED | OUTPATIENT
Start: 2024-02-12 | End: 2024-02-12 | Stop reason: DRUGHIGH

## 2024-02-12 RX ORDER — ROPINIROLE 3 MG/1
3 TABLET, FILM COATED ORAL NIGHTLY
Qty: 90 TABLET | Refills: 2 | Status: SHIPPED | OUTPATIENT
Start: 2024-02-12

## 2024-02-12 RX ORDER — FERROUS SULFATE 325(65) MG
325 TABLET ORAL
Qty: 30 TABLET | Refills: 5 | Status: SHIPPED | OUTPATIENT
Start: 2024-02-12

## 2024-02-12 RX ORDER — NALOXONE HYDROCHLORIDE 4 MG/.1ML
1 SPRAY NASAL PRN
Qty: 1 EACH | Refills: 5 | Status: SHIPPED | OUTPATIENT
Start: 2024-02-12

## 2024-02-12 NOTE — ASSESSMENT & PLAN NOTE
She has chronic pain from spinal stenosis and hip osteoarthritis.  I will continue to treat with oxycodone.  Pharmacist has my permission to change to a different formulation or a different brand of the oxycodone.

## 2024-02-12 NOTE — ASSESSMENT & PLAN NOTE
Restless leg syndrome is not adequately controlled so increasing ropinirole to 3 mg.  I also discussed that low iron can cause restless leg syndrome and she would rather just take an iron pill rather than test for low iron

## 2024-02-12 NOTE — PROGRESS NOTES
40-49.9 (morbid obesity) ([facility])  Assessment & Plan:   Encouraged weight loss.          No follow-ups on file.   Jacob Og MD

## 2024-02-21 ENCOUNTER — OFFICE VISIT (OUTPATIENT)
Dept: CARDIOLOGY CLINIC | Age: 79
End: 2024-02-21

## 2024-02-21 ENCOUNTER — OFFICE VISIT (OUTPATIENT)
Dept: FAMILY MEDICINE CLINIC | Age: 79
End: 2024-02-21
Payer: COMMERCIAL

## 2024-02-21 VITALS
OXYGEN SATURATION: 96 % | BODY MASS INDEX: 40.24 KG/M2 | TEMPERATURE: 97.3 F | DIASTOLIC BLOOD PRESSURE: 80 MMHG | WEIGHT: 220 LBS | SYSTOLIC BLOOD PRESSURE: 122 MMHG | HEART RATE: 89 BPM

## 2024-02-21 VITALS
DIASTOLIC BLOOD PRESSURE: 82 MMHG | BODY MASS INDEX: 40.48 KG/M2 | HEIGHT: 62 IN | HEART RATE: 65 BPM | SYSTOLIC BLOOD PRESSURE: 138 MMHG | WEIGHT: 220 LBS

## 2024-02-21 DIAGNOSIS — R07.9 CHEST PAIN, UNSPECIFIED TYPE: Primary | ICD-10-CM

## 2024-02-21 DIAGNOSIS — N18.31 STAGE 3A CHRONIC KIDNEY DISEASE (HCC): Chronic | ICD-10-CM

## 2024-02-21 DIAGNOSIS — R10.13 EPIGASTRIC PAIN: ICD-10-CM

## 2024-02-21 DIAGNOSIS — G45.9 TIA (TRANSIENT ISCHEMIC ATTACK): ICD-10-CM

## 2024-02-21 DIAGNOSIS — I48.0 PAROXYSMAL ATRIAL FIBRILLATION (HCC): ICD-10-CM

## 2024-02-21 DIAGNOSIS — E66.01 CLASS 3 SEVERE OBESITY DUE TO EXCESS CALORIES WITH SERIOUS COMORBIDITY AND BODY MASS INDEX (BMI) OF 40.0 TO 44.9 IN ADULT (HCC): ICD-10-CM

## 2024-02-21 DIAGNOSIS — F43.22 ADJUSTMENT DISORDER WITH ANXIOUS MOOD: ICD-10-CM

## 2024-02-21 DIAGNOSIS — I48.91 ATRIAL FIBRILLATION, UNSPECIFIED TYPE (HCC): Primary | ICD-10-CM

## 2024-02-21 PROBLEM — I48.20 CHRONIC ATRIAL FIBRILLATION (HCC): Status: ACTIVE | Noted: 2024-02-21

## 2024-02-21 PROCEDURE — G8400 PT W/DXA NO RESULTS DOC: HCPCS | Performed by: FAMILY MEDICINE

## 2024-02-21 PROCEDURE — 93000 ELECTROCARDIOGRAM COMPLETE: CPT | Performed by: FAMILY MEDICINE

## 2024-02-21 PROCEDURE — 1036F TOBACCO NON-USER: CPT | Performed by: FAMILY MEDICINE

## 2024-02-21 PROCEDURE — G8484 FLU IMMUNIZE NO ADMIN: HCPCS | Performed by: FAMILY MEDICINE

## 2024-02-21 PROCEDURE — 1090F PRES/ABSN URINE INCON ASSESS: CPT | Performed by: FAMILY MEDICINE

## 2024-02-21 PROCEDURE — 36415 COLL VENOUS BLD VENIPUNCTURE: CPT | Performed by: FAMILY MEDICINE

## 2024-02-21 PROCEDURE — G8427 DOCREV CUR MEDS BY ELIG CLIN: HCPCS | Performed by: FAMILY MEDICINE

## 2024-02-21 PROCEDURE — 99214 OFFICE O/P EST MOD 30 MIN: CPT | Performed by: FAMILY MEDICINE

## 2024-02-21 PROCEDURE — G8417 CALC BMI ABV UP PARAM F/U: HCPCS | Performed by: FAMILY MEDICINE

## 2024-02-21 PROCEDURE — 1123F ACP DISCUSS/DSCN MKR DOCD: CPT | Performed by: FAMILY MEDICINE

## 2024-02-21 RX ORDER — ATENOLOL 25 MG/1
25 TABLET ORAL DAILY
Qty: 30 TABLET | Refills: 3 | Status: SHIPPED | OUTPATIENT
Start: 2024-02-21

## 2024-02-21 RX ORDER — PANTOPRAZOLE SODIUM 40 MG/1
40 TABLET, DELAYED RELEASE ORAL
Qty: 60 TABLET | Refills: 5 | Status: SHIPPED | OUTPATIENT
Start: 2024-02-21 | End: 2024-02-22

## 2024-02-21 ASSESSMENT — ENCOUNTER SYMPTOMS
CONSTIPATION: 0
DIARRHEA: 0
WHEEZING: 0
ANAL BLEEDING: 0
SHORTNESS OF BREATH: 1
COUGH: 0
ABDOMINAL PAIN: 1
VOMITING: 0
BLOOD IN STOOL: 0
ABDOMINAL DISTENTION: 0

## 2024-02-21 NOTE — PROGRESS NOTES
ischemia mid inferior,EF70%    Hx of echocardiogram 06/26/2017    EF 55-60%. Grade 1 diastolic dysfunction.    Hyperhomocysteinemia (HCC)     Moderate depressive disorder     Morbid obesity (HCC)     Neuropathy     Normocytic anemia     Orthostasis     Osteoarthritis     Sleep apnea     Spinal stenosis of lumbar region      Past Surgical History:   Procedure Laterality Date    CATARACT REMOVAL  2008    CHOLECYSTECTOMY      EYE SURGERY      cataracts    GASTRIC BYPASS SURGERY  2010    HYSTERECTOMY (CERVIX STATUS UNKNOWN)      JOINT REPLACEMENT Left 09/17/2018    [facility]    JOINT REPLACEMENT Right 2003    Dr. Bolaños    SHOULDER SURGERY  2006    total lt shoulder     SHOULDER SURGERY  2006    total rt shoulder    TOTAL KNEE ARTHROPLASTY Right     TOTAL KNEE ARTHROPLASTY  02/2011    left     Family History   Problem Relation Age of Onset    Cancer Mother     High Blood Pressure Mother     Stroke Mother     Heart Disease Father     High Blood Pressure Father     No Known Problems Sister     No Known Problems Sister     No Known Problems Sister     No Known Problems Sister     No Known Problems Brother     No Known Problems Brother     Diabetes type 2  Daughter     Coronary Art Dis Daughter     Obesity Daughter     COPD Daughter     No Known Problems Daughter     No Known Problems Son     Cancer Son         lung    No Known Problems Son     No Known Problems Son     No Known Problems Son     No Known Problems Son     Cancer Maternal Aunt      Social History     Tobacco Use    Smoking status: Never    Smokeless tobacco: Never   Substance Use Topics    Alcohol use: No        Review of Systems:     Constitutional:  No Fever or Weight Loss   Eyes: No Decreased Vision  ENT: No Headaches, Hearing Loss or Vertigo  Cardiovascular: No Chest Pain,  No Shortness of breath, No Palpitations. No Edema   Respiratory: No cough or wheezing . No Respiratory distress   Gastrointestinal: No abdominal pain, appetite loss, blood in stools,

## 2024-02-21 NOTE — PATIENT INSTRUCTIONS
We are committed to providing you the best care possible.    If you receive a survey after visiting one of our offices, please take time to share your experience concerning your physician office visit.  These surveys are confidential and no health information about you is shared.    We are eager to improve for you and we are counting on your feedback to help make that happen.      **It is YOUR responsibilty to bring medication bottles and/or updated medication list to EACH APPOINTMENT. This will allow us to better serve you and all your healthcare needs**    Thank you for allowing us to care for you today!   We want to ensure we can follow your treatment plan and we strive to give you the best outcomes and experience possible.   If you ever have a life threatening emergency and call 911 - for an ambulance (EMS)   Our providers can only care for you at:   [facility] or [facility].   Even if you have someone take you or you drive yourself we can only care for you in a [facility] facility. Our providers are not setup at the other healthcare locations!   Please be informed that if you contact our office outside of normal business hours the physician on call cannot help with any scheduling or rescheduling issues, procedure instruction questions or any type of medication issue.    We advise you for any urgent/emergency that you go to the nearest emergency room!    PLEASE CALL OUR OFFICE DURING NORMAL BUSINESS HOURS    Monday - Friday   8 am to 5 pm    [place]: [phone]    [place]: [phone]    [place]:  [phone]

## 2024-02-21 NOTE — ASSESSMENT & PLAN NOTE
Her epigastric pain may be due to a GI etiology so I will prescribe pantoprazole which should be more potent than the famotidine she is taking.  My biggest concern is the possibility that it could be a cardiac etiology and EKG was done.  It showed atrial fibrillation.  She has seen a cardiologist.  Since the epigastric pain could be from an ulcer I will check a CBC.

## 2024-02-21 NOTE — ASSESSMENT & PLAN NOTE
She has new onset atrial fibrillation.  To my knowledge it is not chronic but when I typed in the diagnosis to epic the computer puts up chronic atrial fibrillation so I erased the word chronic.  Referred to cardiology.  There is a cardiologist in this office who is able to squeeze her in today so she has seen the cardiologist at the time I am dictating this.

## 2024-02-21 NOTE — PROGRESS NOTES
2/21/24    Nereyda Reynaga  1945    SUBJECTIVE    HPI - Nereyda is a 79 y.o. female who presents today for evaluation of:  Chief Complaint   Patient presents with    Abdominal Pain     Ulcer or stress  Under lots of stress       Epigastric pain : under a lot of stress. Not aware of ever having an ulcer. Pain is severe at times. Not taking ibuprofen. Not taking sertraline.     Review of Systems   Constitutional:  Positive for fatigue. Negative for fever.   Respiratory:  Positive for shortness of breath. Negative for cough and wheezing.    Cardiovascular:  Negative for chest pain, palpitations and leg swelling.   Gastrointestinal:  Positive for abdominal pain. Negative for abdominal distention, anal bleeding, blood in stool, constipation, diarrhea and vomiting.   Psychiatric/Behavioral:  The patient is nervous/anxious.          Allergies   Allergen Reactions    Latex     Prednisone Hives        OBJECTIVE    /80 (Site: Left Upper Arm, Position: Sitting, Cuff Size: Large Adult)   Pulse 89   Temp 97.3 °F (36.3 °C) (Infrared)   Wt 99.8 kg (220 lb)   SpO2 96%   BMI 40.24 kg/m²     Physical Exam   Constitutional:       General: Not in acute distress.     Appearance: Normal appearance. Not ill-appearing. Using cane.   Eyes:      General: No scleral icterus.  Cardiovascular:      Rate and Rhythm: Normal rate and regular rhythm.      Heart sounds: No murmur heard.   No friction rub. No gallop.    Pulmonary:      Effort: Pulmonary effort is normal. No respiratory distress.      Breath sounds: No wheezing, rhonchi or rales.   Abdominal:      Palpations: Abdomen is soft. There is no mass.      Tenderness: There is no abdominal tenderness.   Musculoskeletal:     Moves all extremities normally.    Skin:     General: Skin is warm.      Coloration: Skin is not jaundiced.   Neurological:      Mental Status: Patient is alert.   Psychiatric:         Behavior: Behavior normal.         Thought Content: Thought content normal.

## 2024-02-21 NOTE — ASSESSMENT & PLAN NOTE
Declined referral for counseling. I think putting sertraline on hold while we are concerned about ulcer is a good idea.

## 2024-02-22 ENCOUNTER — TELEPHONE (OUTPATIENT)
Dept: CARDIOLOGY CLINIC | Age: 79
End: 2024-02-22

## 2024-02-22 ENCOUNTER — TELEPHONE (OUTPATIENT)
Dept: GASTROENTEROLOGY | Age: 79
End: 2024-02-22

## 2024-02-22 ENCOUNTER — TELEPHONE (OUTPATIENT)
Dept: FAMILY MEDICINE CLINIC | Age: 79
End: 2024-02-22

## 2024-02-22 DIAGNOSIS — R10.13 EPIGASTRIC PAIN: Primary | ICD-10-CM

## 2024-02-22 LAB
ALBUMIN SERPL-MCNC: 4.2 G/DL (ref 3.4–5)
ALBUMIN/GLOB SERPL: 1.6 {RATIO} (ref 1.1–2.2)
ALP SERPL-CCNC: 118 U/L (ref 40–129)
ALT SERPL-CCNC: 11 U/L (ref 10–40)
ANION GAP SERPL CALCULATED.3IONS-SCNC: 12 MMOL/L (ref 3–16)
AST SERPL-CCNC: 18 U/L (ref 15–37)
BASOPHILS # BLD: 0.1 K/UL (ref 0–0.2)
BASOPHILS NFR BLD: 0.7 %
BILIRUB SERPL-MCNC: 0.3 MG/DL (ref 0–1)
BUN SERPL-MCNC: 22 MG/DL (ref 7–20)
CALCIUM SERPL-MCNC: 9.2 MG/DL (ref 8.3–10.6)
CHLORIDE SERPL-SCNC: 102 MMOL/L (ref 99–110)
CO2 SERPL-SCNC: 24 MMOL/L (ref 21–32)
CREAT SERPL-MCNC: 1.1 MG/DL (ref 0.6–1.2)
DEPRECATED RDW RBC AUTO: 15.9 % (ref 12.4–15.4)
EOSINOPHIL # BLD: 0.1 K/UL (ref 0–0.6)
EOSINOPHIL NFR BLD: 1.8 %
GFR SERPLBLD CREATININE-BSD FMLA CKD-EPI: 51 ML/MIN/{1.73_M2}
GLUCOSE SERPL-MCNC: 102 MG/DL (ref 70–99)
HCT VFR BLD AUTO: 34.6 % (ref 36–48)
HGB BLD-MCNC: 11.3 G/DL (ref 12–16)
LYMPHOCYTES # BLD: 2.7 K/UL (ref 1–5.1)
LYMPHOCYTES NFR BLD: 35.2 %
MCH RBC QN AUTO: 26.6 PG (ref 26–34)
MCHC RBC AUTO-ENTMCNC: 32.5 G/DL (ref 31–36)
MCV RBC AUTO: 81.7 FL (ref 80–100)
MONOCYTES # BLD: 0.5 K/UL (ref 0–1.3)
MONOCYTES NFR BLD: 6.6 %
NEUTROPHILS # BLD: 4.2 K/UL (ref 1.7–7.7)
NEUTROPHILS NFR BLD: 55.7 %
PLATELET # BLD AUTO: 371 K/UL (ref 135–450)
PMV BLD AUTO: 9.1 FL (ref 5–10.5)
POTASSIUM SERPL-SCNC: 4.5 MMOL/L (ref 3.5–5.1)
PROT SERPL-MCNC: 6.9 G/DL (ref 6.4–8.2)
RBC # BLD AUTO: 4.24 M/UL (ref 4–5.2)
SODIUM SERPL-SCNC: 138 MMOL/L (ref 136–145)
TSH SERPL DL<=0.005 MIU/L-ACNC: 1.98 UIU/ML (ref 0.27–4.2)
WBC # BLD AUTO: 7.5 K/UL (ref 4–11)

## 2024-02-22 RX ORDER — PANTOPRAZOLE SODIUM 40 MG/1
40 TABLET, DELAYED RELEASE ORAL
Qty: 90 TABLET | Refills: 1 | Status: SHIPPED | OUTPATIENT
Start: 2024-02-22 | End: 2024-02-23

## 2024-02-22 RX ORDER — ATENOLOL 25 MG/1
25 TABLET ORAL DAILY
Qty: 30 TABLET | Refills: 3 | OUTPATIENT
Start: 2024-02-22

## 2024-02-22 NOTE — TELEPHONE ENCOUNTER
OK. I just sent it to [facility]. It can replace the omeprazole and I think pantoprazole tends to be a little stronger.

## 2024-02-22 NOTE — TELEPHONE ENCOUNTER
[facility]     Dr. BAUTISTA Killian     Transesophageal Echocardiogram with Cardioversion    Patient Name: Nereyda Reynaga   : 1945   MRN# [id number]     Date of Procedure: 24 Time: 11:30 Arrival Time: 10:30   (Arrival time is scheduled for one (1) hour before procedure is scheduled.)     [facility]     X   If you have received orders for blood work and or a chest x-ray, please have         them done on assigned date at [facility],         [facility], or [facility].    X   Please do not have anything by mouth after midnight prior to or 8 hours before         the procedure.    X   You may take your medication with a sip of water unless advised otherwise below.      X Please continue to take Eliquis (apixaban) as directed.

## 2024-02-22 NOTE — TELEPHONE ENCOUNTER
Patient was educated by phone on BERTRAND/CV for Dx: afib.  Procedure is scheduled for 2/29/24 @ 11:30, w/arrival @ 10:30, @ [facility].     Procedure and risks were explained to patient. Consent forms were signed.       Patient was notified that procedure could be delayed due to an emergency. Patient voiced understanding.

## 2024-02-22 NOTE — TELEPHONE ENCOUNTER
Patient called stated pharmacy did not receive the pantoprazole can you please resend prescription?

## 2024-02-22 NOTE — TELEPHONE ENCOUNTER
Pt was in [place] yesterday and dr larsen presc her atenolol and she needs it called into [facility]  30 days 25 mg

## 2024-02-23 DIAGNOSIS — R10.13 EPIGASTRIC PAIN: Primary | ICD-10-CM

## 2024-02-23 RX ORDER — PANTOPRAZOLE SODIUM 40 MG/1
40 TABLET, DELAYED RELEASE ORAL
Qty: 90 TABLET | Refills: 2 | Status: SHIPPED | OUTPATIENT
Start: 2024-02-23

## 2024-02-24 RX ORDER — PANTOPRAZOLE SODIUM 40 MG/1
40 TABLET, DELAYED RELEASE ORAL
Qty: 90 TABLET | Refills: 2 | Status: SHIPPED | OUTPATIENT
Start: 2024-02-24

## 2024-02-26 ENCOUNTER — TELEPHONE (OUTPATIENT)
Dept: CARDIOLOGY CLINIC | Age: 79
End: 2024-02-26

## 2024-02-26 ENCOUNTER — TELEPHONE (OUTPATIENT)
Dept: FAMILY MEDICINE CLINIC | Age: 79
End: 2024-02-26

## 2024-02-26 NOTE — TELEPHONE ENCOUNTER
I think the apixaban is important and she should talk with her cardiologist.  I do not think apixaban would cause tiredness the atenolol is possibly something that would cause some tiredness but I believe that is also a cardiology medicine so she should talk to her cardiologist.  If her stomach is not hurting as bad then she may not need the pantoprazole.  Omeprazole was on her problem list before I started the pantoprazole and omeprazole would work fine as well.  I do not think that either pantoprazole or omeprazole would cause tiredness.

## 2024-02-26 NOTE — TELEPHONE ENCOUNTER
Patient called she is not able to take   The new medications, she is feeling so  Weak and tired, no appetite and gets  Very irritated. Wants to know if there is anything else she can take.

## 2024-02-26 NOTE — TELEPHONE ENCOUNTER
Patient called she is not able to take   The new medications, she is feeling so  Weak and tired, no appetite and gets  Very irritated please call

## 2024-02-26 NOTE — TELEPHONE ENCOUNTER
Patient called stated she has been taking the 3 medications that was prescribed last week and they are making her so tired it takes everything to get out of her chair.  She stated she is not going to take them any longer feels like they are doing more harm than good. I advised her to call the [facility] to let them know as 2 of the medication was prescribed by Dr. Killian.   The 3 medications  Pantoprazole  Apixaban  Atenolol

## 2024-02-27 NOTE — TELEPHONE ENCOUNTER
Spoke with patient she does not want to stay on the medication, but will think about it some more before stopping them. She is coming to her up coming testing.  She stated she can not come in for a out patient follow-up next week. She has too many other Dr appointments to go to. She is keeping her appointment with us on 3/20/24.

## 2024-02-28 ENCOUNTER — TELEPHONE (OUTPATIENT)
Dept: CARDIOLOGY CLINIC | Age: 79
End: 2024-02-28

## 2024-02-28 NOTE — TELEPHONE ENCOUNTER
Per Rosario Conner the patient called the [place] office this Morning to cancel her BERTRAND/CV scheduled 2/29. I attempted to call the patient to reschedule but no voice mail was set up. Notified cath lab.

## 2024-02-29 ENCOUNTER — TELEPHONE (OUTPATIENT)
Dept: CARDIOLOGY CLINIC | Age: 79
End: 2024-02-29

## 2024-02-29 ENCOUNTER — TELEPHONE (OUTPATIENT)
Dept: GASTROENTEROLOGY | Age: 79
End: 2024-02-29

## 2024-03-04 ENCOUNTER — TELEPHONE (OUTPATIENT)
Dept: CARDIOLOGY CLINIC | Age: 79
End: 2024-03-04

## 2024-03-07 ENCOUNTER — TELEPHONE (OUTPATIENT)
Dept: FAMILY MEDICINE CLINIC | Age: 79
End: 2024-03-07

## 2024-03-07 NOTE — TELEPHONE ENCOUNTER
Patient called asking for a letter for her to have her cat as an emotional support animal.    Please advise

## 2024-03-08 ENCOUNTER — TELEPHONE (OUTPATIENT)
Dept: CARDIOLOGY CLINIC | Age: 79
End: 2024-03-08

## 2024-03-08 NOTE — TELEPHONE ENCOUNTER
3/6/2024 ECHO         Left Ventricle: Hyperdynamic left ventricular systolic function with a visually estimated EF of 60 - 65%. Left ventricle size is normal. Mildly increased wall thickness. Normal wall motion. Normal diastolic function.    Aortic Valve: Sclerotic but non-stenotic. Moderate regurgitation. AV PHT is 444.0 ms.    Mitral Valve: Annular calcification of the mitral valve. Mild to moderate regurgitation.    Tricuspid Valve: Mild regurgitation. The estimated RVSP is 25 mmHg.    Left Atrium: Left atrium is moderately dilated.    Aorta: Normal sized aortic root and abdominal aorta. Mildly dilated ascending aorta. Ao ascending diameter is 3.7 cm.    Pericardium: No pericardial effusion.    Image quality is fair.       PT had BERTRAND done in hospital 3/11/23 received her results

## 2024-03-11 ENCOUNTER — HOSPITAL ENCOUNTER (OUTPATIENT)
Dept: NON INVASIVE DIAGNOSTICS | Age: 79
Discharge: HOME | End: 2024-03-13
Payer: COMMERCIAL

## 2024-03-11 DIAGNOSIS — I48.20 CHRONIC A-FIB (HCC): ICD-10-CM

## 2024-03-11 PROCEDURE — 93005 ELECTROCARDIOGRAM TRACING: CPT | Performed by: INTERNAL MEDICINE

## 2024-03-12 ENCOUNTER — OFFICE VISIT (OUTPATIENT)
Dept: FAMILY MEDICINE CLINIC | Age: 79
End: 2024-03-12
Payer: COMMERCIAL

## 2024-03-12 VITALS
WEIGHT: 217 LBS | BODY MASS INDEX: 39.69 KG/M2 | SYSTOLIC BLOOD PRESSURE: 130 MMHG | OXYGEN SATURATION: 97 % | TEMPERATURE: 97.5 F | DIASTOLIC BLOOD PRESSURE: 80 MMHG | HEART RATE: 62 BPM

## 2024-03-12 DIAGNOSIS — M54.41 CHRONIC BILATERAL LOW BACK PAIN WITH BILATERAL SCIATICA: Chronic | ICD-10-CM
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
## 2024-03-12 DIAGNOSIS — M16.0 PRIMARY OSTEOARTHRITIS OF BOTH HIPS: ICD-10-CM

## 2024-03-12 DIAGNOSIS — N18.31 STAGE 3A CHRONIC KIDNEY DISEASE (HCC): Primary | Chronic | ICD-10-CM

## 2024-03-12 DIAGNOSIS — T30.0 SCALD OF SKIN: ICD-10-CM

## 2024-03-12 DIAGNOSIS — M48.062 SPINAL STENOSIS OF LUMBAR REGION WITH NEUROGENIC CLAUDICATION: ICD-10-CM

## 2024-03-12 DIAGNOSIS — M54.42 CHRONIC BILATERAL LOW BACK PAIN WITH BILATERAL SCIATICA: Chronic | ICD-10-CM

## 2024-03-12 DIAGNOSIS — J45.30 MILD PERSISTENT ASTHMA WITHOUT COMPLICATION: Chronic | ICD-10-CM

## 2024-03-12 DIAGNOSIS — G89.29 CHRONIC BILATERAL LOW BACK PAIN WITH BILATERAL SCIATICA: Chronic | ICD-10-CM

## 2024-03-12 DIAGNOSIS — E66.01 CLASS 2 SEVERE OBESITY DUE TO EXCESS CALORIES WITH SERIOUS COMORBIDITY AND BODY MASS INDEX (BMI) OF 39.0 TO 39.9 IN ADULT (HCC): ICD-10-CM

## 2024-03-12 PROBLEM — E66.812 CLASS 2 SEVERE OBESITY DUE TO EXCESS CALORIES WITH SERIOUS COMORBIDITY AND BODY MASS INDEX (BMI) OF 39.0 TO 39.9 IN ADULT: Status: ACTIVE | Noted: 2017-05-31

## 2024-03-12 LAB
EKG ATRIAL RATE: 54 BPM
EKG DIAGNOSIS: NORMAL
EKG P AXIS: -15 DEGREES
EKG P-R INTERVAL: 154 MS
EKG Q-T INTERVAL: 464 MS
EKG QRS DURATION: 90 MS
EKG QTC CALCULATION (BAZETT): 440 MS
EKG R AXIS: -23 DEGREES
EKG T AXIS: 24 DEGREES
EKG VENTRICULAR RATE: 54 BPM

## 2024-03-12 PROCEDURE — 1036F TOBACCO NON-USER: CPT | Performed by: FAMILY MEDICINE

## 2024-03-12 PROCEDURE — G8400 PT W/DXA NO RESULTS DOC: HCPCS | Performed by: FAMILY MEDICINE

## 2024-03-12 PROCEDURE — G8427 DOCREV CUR MEDS BY ELIG CLIN: HCPCS | Performed by: FAMILY MEDICINE

## 2024-03-12 PROCEDURE — 93010 ELECTROCARDIOGRAM REPORT: CPT | Performed by: INTERNAL MEDICINE

## 2024-03-12 PROCEDURE — 99213 OFFICE O/P EST LOW 20 MIN: CPT | Performed by: FAMILY MEDICINE

## 2024-03-12 PROCEDURE — 1123F ACP DISCUSS/DSCN MKR DOCD: CPT | Performed by: FAMILY MEDICINE

## 2024-03-12 PROCEDURE — G8484 FLU IMMUNIZE NO ADMIN: HCPCS | Performed by: FAMILY MEDICINE

## 2024-03-12 PROCEDURE — G8417 CALC BMI ABV UP PARAM F/U: HCPCS | Performed by: FAMILY MEDICINE

## 2024-03-12 PROCEDURE — 1090F PRES/ABSN URINE INCON ASSESS: CPT | Performed by: FAMILY MEDICINE

## 2024-03-12 RX ORDER — OXYCODONE AND ACETAMINOPHEN 10; 325 MG/1; MG/1
1 TABLET ORAL EVERY 6 HOURS PRN
Qty: 100 TABLET | Refills: 0 | Status: SHIPPED | OUTPATIENT
Start: 2024-03-12 | End: 2024-04-11

## 2024-03-12 ASSESSMENT — ENCOUNTER SYMPTOMS: CONSTIPATION: 0

## 2024-03-12 NOTE — PROGRESS NOTES
chaperone.  Neurological:      Mental Status: Patient is alert.   Psychiatric:         Behavior: Behavior normal.         Thought Content: Thought content normal.         Judgment: Judgment normal.         ASSESSMENT/PLAN:    1. CKD, Stage 3a chronic kidney disease (HCC)  Assessment & Plan:  Drinks bottled water.    2. Spinal stenosis of lumbar region with neurogenic claudication  -     oxyCODONE-acetaminophen (ENDOCET)  MG per tablet; Take 1 tablet by mouth every 6 hours as needed for Pain for up to 30 days. Intended supply: 30 days. Take #3 on most days. Max Daily Amount: 4 tablets, Disp-100 tablet, R-0Normal  3. OA, Hips, Primary osteoarthritis of both hips  -     oxyCODONE-acetaminophen (ENDOCET)  MG per tablet; Take 1 tablet by mouth every 6 hours as needed for Pain for up to 30 days. Intended supply: 30 days. Take #3 on most days. Max Daily Amount: 4 tablets, Disp-100 tablet, R-0Normal  4. Back pain, Chronic bilateral low back pain with bilateral sciatica  Assessment & Plan:   Her pain is well-controlled with oxycodone 10-3 25 100 pills for every 30 days.  Will continue this.  Encouraged her to do things to reduce stress.  Orders:  -     oxyCODONE-acetaminophen (ENDOCET)  MG per tablet; Take 1 tablet by mouth every 6 hours as needed for Pain for up to 30 days. Intended supply: 30 days. Take #3 on most days. Max Daily Amount: 4 tablets, Disp-100 tablet, R-0Normal  5. Mild persistent asthma without complication  Assessment & Plan:   No current problems.   6. Class 2 severe obesity due to excess calories with serious comorbidity and body mass index (BMI) of 39.0 to 39.9 in adult ([facility])  Assessment & Plan:   Great. Weight is down. Obesity is one reason to not add gabapentin.   7. Scald of skin  It does not look like a bad scald and reassured.        Return in about 30 days (around 4/11/2024) for back pain w// sciatic .   Jacob Og MD

## 2024-03-12 NOTE — ASSESSMENT & PLAN NOTE
Her pain is well-controlled with oxycodone 10-3 25 100 pills for every 30 days.  Will continue this.  Encouraged her to do things to reduce stress.

## 2024-04-12 ENCOUNTER — OFFICE VISIT (OUTPATIENT)
Dept: FAMILY MEDICINE CLINIC | Age: 79
End: 2024-04-12
Payer: MEDICARE

## 2024-04-12 VITALS
DIASTOLIC BLOOD PRESSURE: 80 MMHG | OXYGEN SATURATION: 96 % | WEIGHT: 221.6 LBS | HEART RATE: 56 BPM | SYSTOLIC BLOOD PRESSURE: 134 MMHG | BODY MASS INDEX: 40.53 KG/M2

## 2024-04-12 DIAGNOSIS — K21.9 GASTROESOPHAGEAL REFLUX DISEASE WITHOUT ESOPHAGITIS: ICD-10-CM

## 2024-04-12 DIAGNOSIS — M16.0 PRIMARY OSTEOARTHRITIS OF BOTH HIPS: ICD-10-CM

## 2024-04-12 DIAGNOSIS — M48.062 SPINAL STENOSIS OF LUMBAR REGION WITH NEUROGENIC CLAUDICATION: Primary | ICD-10-CM

## 2024-04-12 DIAGNOSIS — F41.1 GAD (GENERALIZED ANXIETY DISORDER): ICD-10-CM

## 2024-04-12 DIAGNOSIS — M54.41 CHRONIC BILATERAL LOW BACK PAIN WITH BILATERAL SCIATICA: Chronic | ICD-10-CM

## 2024-04-12 DIAGNOSIS — E66.01 CLASS 3 SEVERE OBESITY DUE TO EXCESS CALORIES WITH SERIOUS COMORBIDITY AND BODY MASS INDEX (BMI) OF 40.0 TO 44.9 IN ADULT (HCC): ICD-10-CM

## 2024-04-12 DIAGNOSIS — G89.29 CHRONIC BILATERAL LOW BACK PAIN WITH BILATERAL SCIATICA: Chronic | ICD-10-CM

## 2024-04-12 DIAGNOSIS — M54.42 CHRONIC BILATERAL LOW BACK PAIN WITH BILATERAL SCIATICA: Chronic | ICD-10-CM

## 2024-04-12 PROCEDURE — G8417 CALC BMI ABV UP PARAM F/U: HCPCS | Performed by: FAMILY MEDICINE

## 2024-04-12 PROCEDURE — 1090F PRES/ABSN URINE INCON ASSESS: CPT | Performed by: FAMILY MEDICINE

## 2024-04-12 PROCEDURE — G8400 PT W/DXA NO RESULTS DOC: HCPCS | Performed by: FAMILY MEDICINE

## 2024-04-12 PROCEDURE — 99213 OFFICE O/P EST LOW 20 MIN: CPT | Performed by: FAMILY MEDICINE

## 2024-04-12 PROCEDURE — 1036F TOBACCO NON-USER: CPT | Performed by: FAMILY MEDICINE

## 2024-04-12 PROCEDURE — G8427 DOCREV CUR MEDS BY ELIG CLIN: HCPCS | Performed by: FAMILY MEDICINE

## 2024-04-12 PROCEDURE — 1123F ACP DISCUSS/DSCN MKR DOCD: CPT | Performed by: FAMILY MEDICINE

## 2024-04-12 RX ORDER — FAMOTIDINE 20 MG/1
20 TABLET, FILM COATED ORAL 2 TIMES DAILY
Qty: 180 TABLET | Refills: 1 | Status: SHIPPED | OUTPATIENT
Start: 2024-04-12

## 2024-04-12 RX ORDER — OXYCODONE AND ACETAMINOPHEN 10; 325 MG/1; MG/1
1 TABLET ORAL EVERY 6 HOURS PRN
Qty: 100 TABLET | Refills: 0 | Status: SHIPPED | OUTPATIENT
Start: 2024-04-12 | End: 2024-05-12

## 2024-04-12 ASSESSMENT — ENCOUNTER SYMPTOMS
CONSTIPATION: 0
NAUSEA: 0
DIARRHEA: 0
VOMITING: 0

## 2024-04-12 NOTE — ASSESSMENT & PLAN NOTE
Her hip osteoarthritis pain is also helped with the oxycodone and it is stable and I will prescribe the oxycodone for an upcoming month

## 2024-04-12 NOTE — ASSESSMENT & PLAN NOTE
She has chronic back pain related to spinal stenosis that is helped with oxycodone.  She is stable and has used the same dose of oxycodone for quite some time and I will prescribe it for the upcoming month.

## 2024-04-12 NOTE — PROGRESS NOTES
the same dose of oxycodone for quite some time and I will prescribe it for the upcoming month.  Orders:  -     oxyCODONE-acetaminophen (ENDOCET)  MG per tablet; Take 1 tablet by mouth every 6 hours as needed for Pain for up to 30 days. Intended supply: 30 days. Take #3 on most days. Max Daily Amount: 4 tablets, Disp-100 tablet, R-0Normal  2. CHARLIE (generalized anxiety disorder)  Assessment & Plan:   Her anxiety is stable and benefits from sertraline and I will prescribe that for the upcoming 180 days  Orders:  -     sertraline (ZOLOFT) 50 MG tablet; Take 1 tablet by mouth nightly, Disp-90 tablet, R-1Normal  3. Gastroesophageal reflux disease without esophagitis  Assessment & Plan:   Her acid reflux is helped with famotidine and I will put in a routine refill.  Orders:  -     famotidine (PEPCID) 20 MG tablet; Take 1 tablet by mouth 2 times daily Use as an alternative to omeprazole., Disp-180 tablet, R-1Normal  4. OA, Hips, Primary osteoarthritis of both hips  Assessment & Plan:   Her hip osteoarthritis pain is also helped with the oxycodone and it is stable and I will prescribe the oxycodone for an upcoming month  Orders:  -     oxyCODONE-acetaminophen (ENDOCET)  MG per tablet; Take 1 tablet by mouth every 6 hours as needed for Pain for up to 30 days. Intended supply: 30 days. Take #3 on most days. Max Daily Amount: 4 tablets, Disp-100 tablet, R-0Normal  5. Back pain, Chronic bilateral low back pain with bilateral sciatica  Assessment & Plan:   See the spinal stenosis note  Orders:  -     oxyCODONE-acetaminophen (ENDOCET)  MG per tablet; Take 1 tablet by mouth every 6 hours as needed for Pain for up to 30 days. Intended supply: 30 days. Take #3 on most days. Max Daily Amount: 4 tablets, Disp-100 tablet, R-0Normal  6. Class 3 severe obesity due to excess calories with serious comorbidity and body mass index (BMI) of 40.0 to 44.9 in adult (HCC)  Assessment & Plan:   Encouraged her to work on weight

## 2024-04-12 NOTE — ASSESSMENT & PLAN NOTE
Her anxiety is stable and benefits from sertraline and I will prescribe that for the upcoming 180 days

## 2024-05-13 ENCOUNTER — OFFICE VISIT (OUTPATIENT)
Dept: FAMILY MEDICINE CLINIC | Age: 79
End: 2024-05-13
Payer: MEDICARE

## 2024-05-13 VITALS
DIASTOLIC BLOOD PRESSURE: 70 MMHG | OXYGEN SATURATION: 96 % | WEIGHT: 219.6 LBS | HEIGHT: 62 IN | SYSTOLIC BLOOD PRESSURE: 132 MMHG | HEART RATE: 49 BPM | RESPIRATION RATE: 16 BRPM | BODY MASS INDEX: 40.41 KG/M2

## 2024-05-13 DIAGNOSIS — F41.1 GAD (GENERALIZED ANXIETY DISORDER): ICD-10-CM

## 2024-05-13 DIAGNOSIS — G89.29 CHRONIC BILATERAL LOW BACK PAIN WITH BILATERAL SCIATICA: Primary | Chronic | ICD-10-CM

## 2024-05-13 DIAGNOSIS — G25.81 RLS (RESTLESS LEGS SYNDROME): ICD-10-CM

## 2024-05-13 DIAGNOSIS — M54.42 CHRONIC BILATERAL LOW BACK PAIN WITH BILATERAL SCIATICA: Primary | Chronic | ICD-10-CM

## 2024-05-13 DIAGNOSIS — M48.062 SPINAL STENOSIS OF LUMBAR REGION WITH NEUROGENIC CLAUDICATION: ICD-10-CM

## 2024-05-13 DIAGNOSIS — M54.41 CHRONIC BILATERAL LOW BACK PAIN WITH BILATERAL SCIATICA: Primary | Chronic | ICD-10-CM

## 2024-05-13 DIAGNOSIS — Z63.6 CAREGIVER STRESS: ICD-10-CM

## 2024-05-13 DIAGNOSIS — M16.0 PRIMARY OSTEOARTHRITIS OF BOTH HIPS: ICD-10-CM

## 2024-05-13 DIAGNOSIS — N39.41 URGE INCONTINENCE: ICD-10-CM

## 2024-05-13 PROCEDURE — 99213 OFFICE O/P EST LOW 20 MIN: CPT | Performed by: FAMILY MEDICINE

## 2024-05-13 PROCEDURE — 1036F TOBACCO NON-USER: CPT | Performed by: FAMILY MEDICINE

## 2024-05-13 PROCEDURE — G8417 CALC BMI ABV UP PARAM F/U: HCPCS | Performed by: FAMILY MEDICINE

## 2024-05-13 PROCEDURE — 0509F URINE INCON PLAN DOCD: CPT | Performed by: FAMILY MEDICINE

## 2024-05-13 PROCEDURE — G8427 DOCREV CUR MEDS BY ELIG CLIN: HCPCS | Performed by: FAMILY MEDICINE

## 2024-05-13 PROCEDURE — G8400 PT W/DXA NO RESULTS DOC: HCPCS | Performed by: FAMILY MEDICINE

## 2024-05-13 PROCEDURE — 1090F PRES/ABSN URINE INCON ASSESS: CPT | Performed by: FAMILY MEDICINE

## 2024-05-13 PROCEDURE — 1123F ACP DISCUSS/DSCN MKR DOCD: CPT | Performed by: FAMILY MEDICINE

## 2024-05-13 RX ORDER — MIRABEGRON 25 MG/1
TABLET, FILM COATED, EXTENDED RELEASE ORAL
Qty: 90 TABLET | Refills: 3 | Status: SHIPPED | OUTPATIENT
Start: 2024-05-13

## 2024-05-13 RX ORDER — HYDROXYZINE HYDROCHLORIDE 25 MG/1
25 TABLET, FILM COATED ORAL EVERY 8 HOURS PRN
Qty: 60 TABLET | Refills: 2 | Status: SHIPPED | OUTPATIENT
Start: 2024-05-13

## 2024-05-13 RX ORDER — ROPINIROLE 3 MG/1
3 TABLET, FILM COATED ORAL NIGHTLY
Qty: 90 TABLET | Refills: 2 | Status: SHIPPED | OUTPATIENT
Start: 2024-05-13 | End: 2024-05-13

## 2024-05-13 RX ORDER — ROPINIROLE 4 MG/1
4 TABLET, FILM COATED ORAL NIGHTLY
Qty: 90 TABLET | Refills: 1 | Status: SHIPPED | OUTPATIENT
Start: 2024-05-13

## 2024-05-13 RX ORDER — OXYCODONE AND ACETAMINOPHEN 10; 325 MG/1; MG/1
1 TABLET ORAL EVERY 6 HOURS PRN
Qty: 95 TABLET | Refills: 0 | Status: SHIPPED | OUTPATIENT
Start: 2024-05-13 | End: 2024-06-12

## 2024-05-13 SDOH — SOCIAL STABILITY - SOCIAL INSECURITY: DEPENDENT RELATIVE NEEDING CARE AT HOME: Z63.6

## 2024-05-13 NOTE — PROGRESS NOTES
5/13/24    Nereyda Reynaga  1945    SUBJECTIVE    HPI - Nereyda is a 79 y.o. female who presents today for evaluation of:  Chief Complaint   Patient presents with    Back Pain     Pain is rated at a 10/10 today with pain continually radiating down the right leg    Medication Refill     Her back pain is about the same.  She reports that she has to walk a greater distance than usual due to some construction that is near where she lives.  She would like a handicap parking sticker.    Caregiver stress: She reports doing a lot of walking around stores when she takes her step mother out shopping.  She reports that her stepmother is becoming more difficult to take care of due to her dementia and soiling herself with stool.  She will reports that her stepmother has not kept her 's license and automobile registration up-to-date.      Allergies   Allergen Reactions    Latex     Prednisone Hives        OBJECTIVE    /70 (Site: Left Upper Arm, Position: Sitting, Cuff Size: Medium Adult)   Pulse (!) 49   Resp 16   Ht 1.575 m (5' 2\")   Wt 99.6 kg (219 lb 9.6 oz)   SpO2 96%   BMI 40.17 kg/m²     Physical Exam   Constitutional:       General: Not in acute distress.     Appearance: Normal appearance. Not ill-appearing.   Eyes:      General: No scleral icterus.  Cardiovascular:      Rate and Rhythm: Normal rate and regular rhythm.      Heart sounds: No murmur heard.   No friction rub. No gallop.    Pulmonary:      Effort: Pulmonary effort is normal. No respiratory distress.      Breath sounds: No wheezing, rhonchi or rales.   Abdominal:      Palpations: Abdomen is soft. There is no mass.      Tenderness: There is no abdominal tenderness.   Musculoskeletal:     Moves all extremities normally.    Skin:     General: Skin is warm.      Coloration: Skin is not jaundiced.   Neurological:      Mental Status: Patient is alert.   Psychiatric:         Behavior: Behavior normal.         Thought Content: Thought content normal.

## 2024-05-13 NOTE — ASSESSMENT & PLAN NOTE
Stable chronic anxiety.  She will continue hydroxyzine and sertraline and I will put out refills.

## 2024-05-13 NOTE — ASSESSMENT & PLAN NOTE
I gave counseling on how she could report her stepmother's symptoms to stepmothers care provider in order to prompt an evaluation of the stepmother for whether she should be considered for assisted living or other arrangement.

## 2024-05-13 NOTE — ASSESSMENT & PLAN NOTE
Stable.  I think that the oxycodone is giving her benefit.  Due to my decision to lower the maximum amount of opioids that I will prescribe per 30 days I am reducing the dose slightly.  I think that it is probably best for patients taking higher doses of opioids to determine any other management that they can come up with that will help control the pain.

## 2024-05-13 NOTE — ASSESSMENT & PLAN NOTE
She has stable chronic osteoarthritis of her hips that causes pain.  I will put out refill of oxycodone at a slightly lower dose due to my personal decision to lower doses for everyone taking more than a certain amount.  Handicap placard form given

## 2024-06-11 SDOH — ECONOMIC STABILITY: FOOD INSECURITY: WITHIN THE PAST 12 MONTHS, YOU WORRIED THAT YOUR FOOD WOULD RUN OUT BEFORE YOU GOT MONEY TO BUY MORE.: OFTEN TRUE

## 2024-06-11 SDOH — ECONOMIC STABILITY: INCOME INSECURITY: HOW HARD IS IT FOR YOU TO PAY FOR THE VERY BASICS LIKE FOOD, HOUSING, MEDICAL CARE, AND HEATING?: SOMEWHAT HARD

## 2024-06-11 SDOH — ECONOMIC STABILITY: FOOD INSECURITY: WITHIN THE PAST 12 MONTHS, THE FOOD YOU BOUGHT JUST DIDN'T LAST AND YOU DIDN'T HAVE MONEY TO GET MORE.: SOMETIMES TRUE

## 2024-06-11 SDOH — ECONOMIC STABILITY: TRANSPORTATION INSECURITY
IN THE PAST 12 MONTHS, HAS LACK OF TRANSPORTATION KEPT YOU FROM MEETINGS, WORK, OR FROM GETTING THINGS NEEDED FOR DAILY LIVING?: YES

## 2024-06-12 ENCOUNTER — OFFICE VISIT (OUTPATIENT)
Dept: FAMILY MEDICINE CLINIC | Age: 79
End: 2024-06-12
Payer: MEDICARE

## 2024-06-12 VITALS
BODY MASS INDEX: 40.79 KG/M2 | TEMPERATURE: 97 F | SYSTOLIC BLOOD PRESSURE: 142 MMHG | HEART RATE: 69 BPM | DIASTOLIC BLOOD PRESSURE: 77 MMHG | WEIGHT: 223 LBS | OXYGEN SATURATION: 96 %

## 2024-06-12 DIAGNOSIS — G89.29 CHRONIC BILATERAL LOW BACK PAIN WITH BILATERAL SCIATICA: Chronic | ICD-10-CM

## 2024-06-12 DIAGNOSIS — M54.42 CHRONIC BILATERAL LOW BACK PAIN WITH BILATERAL SCIATICA: Chronic | ICD-10-CM

## 2024-06-12 DIAGNOSIS — M54.41 CHRONIC BILATERAL LOW BACK PAIN WITH BILATERAL SCIATICA: Chronic | ICD-10-CM

## 2024-06-12 DIAGNOSIS — Z79.891 LONG TERM (CURRENT) USE OF OPIATE ANALGESIC: ICD-10-CM

## 2024-06-12 DIAGNOSIS — E66.01 CLASS 3 SEVERE OBESITY DUE TO EXCESS CALORIES WITH SERIOUS COMORBIDITY AND BODY MASS INDEX (BMI) OF 40.0 TO 44.9 IN ADULT (HCC): ICD-10-CM

## 2024-06-12 DIAGNOSIS — Z63.6 CAREGIVER STRESS: ICD-10-CM

## 2024-06-12 DIAGNOSIS — M16.0 PRIMARY OSTEOARTHRITIS OF BOTH HIPS: ICD-10-CM

## 2024-06-12 DIAGNOSIS — M48.062 SPINAL STENOSIS OF LUMBAR REGION WITH NEUROGENIC CLAUDICATION: Primary | ICD-10-CM

## 2024-06-12 PROCEDURE — G8427 DOCREV CUR MEDS BY ELIG CLIN: HCPCS | Performed by: FAMILY MEDICINE

## 2024-06-12 PROCEDURE — 1036F TOBACCO NON-USER: CPT | Performed by: FAMILY MEDICINE

## 2024-06-12 PROCEDURE — 1090F PRES/ABSN URINE INCON ASSESS: CPT | Performed by: FAMILY MEDICINE

## 2024-06-12 PROCEDURE — 99213 OFFICE O/P EST LOW 20 MIN: CPT | Performed by: FAMILY MEDICINE

## 2024-06-12 PROCEDURE — G8417 CALC BMI ABV UP PARAM F/U: HCPCS | Performed by: FAMILY MEDICINE

## 2024-06-12 PROCEDURE — 1123F ACP DISCUSS/DSCN MKR DOCD: CPT | Performed by: FAMILY MEDICINE

## 2024-06-12 PROCEDURE — G8400 PT W/DXA NO RESULTS DOC: HCPCS | Performed by: FAMILY MEDICINE

## 2024-06-12 RX ORDER — OXYCODONE AND ACETAMINOPHEN 10; 325 MG/1; MG/1
1 TABLET ORAL EVERY 8 HOURS PRN
Qty: 90 TABLET | Refills: 0 | Status: SHIPPED | OUTPATIENT
Start: 2024-06-12 | End: 2024-07-12

## 2024-06-12 SDOH — SOCIAL STABILITY - SOCIAL INSECURITY: DEPENDENT RELATIVE NEEDING CARE AT HOME: Z63.6

## 2024-06-12 ASSESSMENT — ENCOUNTER SYMPTOMS
DIARRHEA: 0
CONSTIPATION: 0

## 2024-06-12 NOTE — PROGRESS NOTES
6/12/24    Nereyda Reynaga  1945    SUBJECTIVE    HPI - Nereyda is a 79 y.o. female who presents today for evaluation of:  Chief Complaint   Patient presents with    1 Month Follow-Up     Pain       Back pain : does not want me to reduce the oxycodone more this month as I have planned to do. She does not want to change to hydrocodone. She does not want to switch the sertraline to duloxetine. She does not want to see a pain management doctor. She is not interested in seeing a psychologist to learn mental ways to deal with pain. She is not interested in acupuncture. She is not interested in a chiropractor. She is not interested in medical massage. Seh does nto want to be referred to [facility].     Obesity: She feels confident that her increase in weight is because she has been drinking a lot of water.    Review of Systems   Cardiovascular:  Negative for chest pain.   Gastrointestinal:  Negative for constipation and diarrhea.   Psychiatric/Behavioral:  Negative for self-injury and suicidal ideas.        Allergies   Allergen Reactions    Latex     Prednisone Hives        OBJECTIVE    BP (!) 142/77 (Site: Right Upper Arm)   Pulse 69   Temp 97 °F (36.1 °C) (Infrared)   Wt 101.2 kg (223 lb)   SpO2 96%   BMI 40.79 kg/m²     Physical Exam   Constitutional:       General: Not in acute distress.     Appearance: Normal appearance. Not ill-appearing.   Eyes:      General: No scleral icterus.  Cardiovascular:      Rate and Rhythm: Normal rate and regular rhythm.      Heart sounds: No murmur heard.   No friction rub. No gallop.    Pulmonary:      Effort: Pulmonary effort is normal. No respiratory distress.      Breath sounds: No wheezing, rhonchi or rales.     Musculoskeletal:     Moves all extremities normally.  Mild bilat ankle swelling  Skin:     General: Skin is warm.      Coloration: Skin is not jaundiced.   Neurological:      Mental Status: Patient is alert.   Psychiatric:         Behavior: Behavior normal.

## 2024-06-12 NOTE — ASSESSMENT & PLAN NOTE
Discussed that it is okay for her to set limits with her \"mother\".  Discussed that she may decide what food is eaten but her \"mother\" can decide how much to eat.

## 2024-06-12 NOTE — ASSESSMENT & PLAN NOTE
She has stable back pain and I am reducing to 90 pills/month as part of my plan to reduce the total amount of opioids that I prescribed to anyone.  This is not something specific to Ms. Reynaga.  She was not interested in several ideas of nonopioid pain management.

## 2024-06-13 LAB
AMPHETAMINES UR QL SCN>1000 NG/ML: ABNORMAL
BARBITURATES UR QL SCN>200 NG/ML: ABNORMAL
BENZODIAZ UR QL SCN>200 NG/ML: ABNORMAL
CANNABINOIDS UR QL SCN>50 NG/ML: ABNORMAL
COCAINE UR QL SCN: ABNORMAL
DRUG SCREEN COMMENT UR-IMP: ABNORMAL
FENTANYL SCREEN, URINE: ABNORMAL
METHADONE UR QL SCN>300 NG/ML: ABNORMAL
OPIATES UR QL SCN>300 NG/ML: ABNORMAL
OXYCODONE UR QL SCN: POSITIVE
PCP UR QL SCN>25 NG/ML: ABNORMAL
PH UR STRIP: 5 [PH]

## 2024-07-12 ENCOUNTER — OFFICE VISIT (OUTPATIENT)
Dept: FAMILY MEDICINE CLINIC | Age: 79
End: 2024-07-12
Payer: MEDICARE

## 2024-07-12 VITALS
WEIGHT: 220 LBS | TEMPERATURE: 97.2 F | OXYGEN SATURATION: 96 % | BODY MASS INDEX: 40.24 KG/M2 | HEART RATE: 52 BPM | SYSTOLIC BLOOD PRESSURE: 120 MMHG | DIASTOLIC BLOOD PRESSURE: 84 MMHG

## 2024-07-12 DIAGNOSIS — G89.29 CHRONIC BILATERAL LOW BACK PAIN WITH BILATERAL SCIATICA: Chronic | ICD-10-CM

## 2024-07-12 DIAGNOSIS — M54.41 CHRONIC BILATERAL LOW BACK PAIN WITH BILATERAL SCIATICA: Chronic | ICD-10-CM

## 2024-07-12 DIAGNOSIS — E66.01 CLASS 3 SEVERE OBESITY DUE TO EXCESS CALORIES WITH SERIOUS COMORBIDITY AND BODY MASS INDEX (BMI) OF 40.0 TO 44.9 IN ADULT (HCC): ICD-10-CM

## 2024-07-12 DIAGNOSIS — M79.89 LEG SWELLING: ICD-10-CM

## 2024-07-12 DIAGNOSIS — M54.42 CHRONIC BILATERAL LOW BACK PAIN WITH BILATERAL SCIATICA: Chronic | ICD-10-CM

## 2024-07-12 DIAGNOSIS — F33.41 RECURRENT MAJOR DEPRESSIVE DISORDER, IN PARTIAL REMISSION (HCC): ICD-10-CM

## 2024-07-12 DIAGNOSIS — N18.31 STAGE 3A CHRONIC KIDNEY DISEASE (HCC): Chronic | ICD-10-CM

## 2024-07-12 DIAGNOSIS — M16.0 PRIMARY OSTEOARTHRITIS OF BOTH HIPS: Primary | ICD-10-CM

## 2024-07-12 DIAGNOSIS — M48.062 SPINAL STENOSIS OF LUMBAR REGION WITH NEUROGENIC CLAUDICATION: ICD-10-CM

## 2024-07-12 PROBLEM — J45.30 MILD PERSISTENT ASTHMA: Chronic | Status: RESOLVED | Noted: 2022-08-09 | Resolved: 2024-07-12

## 2024-07-12 PROCEDURE — G8417 CALC BMI ABV UP PARAM F/U: HCPCS | Performed by: FAMILY MEDICINE

## 2024-07-12 PROCEDURE — G8400 PT W/DXA NO RESULTS DOC: HCPCS | Performed by: FAMILY MEDICINE

## 2024-07-12 PROCEDURE — 1123F ACP DISCUSS/DSCN MKR DOCD: CPT | Performed by: FAMILY MEDICINE

## 2024-07-12 PROCEDURE — 99213 OFFICE O/P EST LOW 20 MIN: CPT | Performed by: FAMILY MEDICINE

## 2024-07-12 PROCEDURE — G8427 DOCREV CUR MEDS BY ELIG CLIN: HCPCS | Performed by: FAMILY MEDICINE

## 2024-07-12 PROCEDURE — 1090F PRES/ABSN URINE INCON ASSESS: CPT | Performed by: FAMILY MEDICINE

## 2024-07-12 PROCEDURE — 1036F TOBACCO NON-USER: CPT | Performed by: FAMILY MEDICINE

## 2024-07-12 RX ORDER — TRIAMTERENE AND HYDROCHLOROTHIAZIDE 37.5; 25 MG/1; MG/1
1 TABLET ORAL DAILY PRN
Qty: 30 TABLET | Refills: 0 | Status: SHIPPED | OUTPATIENT
Start: 2024-07-12

## 2024-07-12 RX ORDER — OXYCODONE AND ACETAMINOPHEN 10; 325 MG/1; MG/1
1 TABLET ORAL EVERY 8 HOURS PRN
Qty: 90 TABLET | Refills: 0 | Status: SHIPPED | OUTPATIENT
Start: 2024-07-12 | End: 2024-08-11

## 2024-07-12 NOTE — ASSESSMENT & PLAN NOTE
Continuing oxycodone to help with hip pain relief.  
 Encouraged continued weight loss.  
 Encouraged drinking enough water.  
 She has continued depression but currently it is in excellent control.  
 She has stable chronic back pain and I think she is doing all right with gradually reduced amount of oxycodone.  I will represcribe the oxycodone that gives her more benefit than risk.  
 Stable chronic back pain.  Continue oxycodone.  
2E

## 2024-07-12 NOTE — PROGRESS NOTES
7/12/24    Nereyda Reynaga  1945    SUBJECTIVE    HPI - Nereyda is a 79 y.o. female who presents today for evaluation of:  Chief Complaint   Patient presents with    Follow-up     Medication      Pain  (back) : pain is about the same. No constipation from pain meds. Worse with rainy weather.     Legs a little swollen and wants a water pill.     CKD : drinks water.     Obesity : She did lose some weight.      Asthma :denies asthma     Depression : not feeling depressed.          Allergies   Allergen Reactions    Latex     Prednisone Hives        OBJECTIVE    /84 (Site: Left Upper Arm, Position: Sitting, Cuff Size: Medium Adult)   Pulse 52   Temp 97.2 °F (36.2 °C) (Infrared)   Wt 99.8 kg (220 lb)   SpO2 96%   BMI 40.24 kg/m²     Physical Exam   Constitutional:       General: Not in acute distress.     Appearance: Normal appearance. Not ill-appearing.   Eyes:      General: No scleral icterus.  Cardiovascular:      Rate and Rhythm: Normal rate and regular rhythm.      Heart sounds: No murmur heard.   No friction rub. No gallop.    Pulmonary:      Effort: Pulmonary effort is normal. No respiratory distress.      Breath sounds: No wheezing, rhonchi or rales.   Abdominal:      Palpations: Abdomen is soft. There is no mass.      Tenderness: There is no abdominal tenderness.   Musculoskeletal:     Moves all extremities normally.  Right calf appears larger and was measured at 40 cm circumference.  Left calf measured at 39 cm circumference.  Note that the left ankle however appears more swollen than the right ankle.  Neither leg swelling is significant enough that I am concerned about DVT.  Skin:     General: Skin is warm.      Coloration: Skin is not jaundiced.   Neurological:      Mental Status: Patient is alert.   Psychiatric:         Behavior: Behavior normal.         Thought Content: Thought content normal.         Judgment: Judgment normal.      Reviewed PDMP that shows last prescription for   OXYCODONE 10 TID

## 2024-07-22 ENCOUNTER — OFFICE VISIT (OUTPATIENT)
Dept: FAMILY MEDICINE CLINIC | Age: 79
End: 2024-07-22
Payer: MEDICARE

## 2024-07-22 VITALS
SYSTOLIC BLOOD PRESSURE: 130 MMHG | TEMPERATURE: 97.3 F | HEART RATE: 52 BPM | DIASTOLIC BLOOD PRESSURE: 80 MMHG | OXYGEN SATURATION: 98 % | WEIGHT: 221.8 LBS | BODY MASS INDEX: 40.57 KG/M2

## 2024-07-22 DIAGNOSIS — J45.30 MILD PERSISTENT ASTHMA WITHOUT COMPLICATION: ICD-10-CM

## 2024-07-22 DIAGNOSIS — J20.9 ACUTE BRONCHITIS, UNSPECIFIED ORGANISM: Primary | ICD-10-CM

## 2024-07-22 PROCEDURE — 1123F ACP DISCUSS/DSCN MKR DOCD: CPT | Performed by: FAMILY MEDICINE

## 2024-07-22 PROCEDURE — 1090F PRES/ABSN URINE INCON ASSESS: CPT | Performed by: FAMILY MEDICINE

## 2024-07-22 PROCEDURE — G8427 DOCREV CUR MEDS BY ELIG CLIN: HCPCS | Performed by: FAMILY MEDICINE

## 2024-07-22 PROCEDURE — G8417 CALC BMI ABV UP PARAM F/U: HCPCS | Performed by: FAMILY MEDICINE

## 2024-07-22 PROCEDURE — 99213 OFFICE O/P EST LOW 20 MIN: CPT | Performed by: FAMILY MEDICINE

## 2024-07-22 PROCEDURE — 1036F TOBACCO NON-USER: CPT | Performed by: FAMILY MEDICINE

## 2024-07-22 PROCEDURE — G8400 PT W/DXA NO RESULTS DOC: HCPCS | Performed by: FAMILY MEDICINE

## 2024-07-22 RX ORDER — GUAIFENESIN 600 MG/1
1200 TABLET, EXTENDED RELEASE ORAL 2 TIMES DAILY
Qty: 40 TABLET | Refills: 0 | Status: SHIPPED | OUTPATIENT
Start: 2024-07-22 | End: 2024-08-01

## 2024-07-22 RX ORDER — BENZONATATE 100 MG/1
100-200 CAPSULE ORAL 3 TIMES DAILY PRN
Qty: 60 CAPSULE | Refills: 0 | Status: SHIPPED | OUTPATIENT
Start: 2024-07-22 | End: 2024-08-01

## 2024-07-22 ASSESSMENT — ENCOUNTER SYMPTOMS
SINUS PAIN: 0
FACIAL SWELLING: 0
COUGH: 1
WHEEZING: 1
SINUS PRESSURE: 0
SHORTNESS OF BREATH: 1

## 2024-07-22 NOTE — PROGRESS NOTES
extremities normally.    Skin:     General: Skin is warm.      Coloration: Skin is not jaundiced.   Neurological:      Mental Status: Patient is alert.   Psychiatric:         Behavior: Behavior normal.         Thought Content: Thought content normal.         Judgment: Judgment normal.          ASSESSMENT/PLAN:    1. Acute bronchitis, unspecified organism  -     benzonatate (TESSALON) 100 MG capsule; Take 1-2 capsules by mouth 3 times daily as needed for Cough, Disp-60 capsule, R-0Normal  -     guaiFENesin (MUCINEX) 600 MG extended release tablet; Take 2 tablets by mouth 2 times daily for 10 days, Disp-40 tablet, R-0Normal  2. Mild persistent asthma without complication  Assessment & Plan:   Continue Breo. If not better in 1-2 days I can call in an antibiotic.       I think she has an acute bronchitis.  Current evidence suggests viral illness.  I will prescribe benzonatate and guaifenesin.  I told her that if she is not better in 1 or 2 days to let me know and I could call in an antibiotic.    No follow-ups on file.   Jacob Og MD

## 2024-08-12 ENCOUNTER — OFFICE VISIT (OUTPATIENT)
Dept: FAMILY MEDICINE CLINIC | Age: 79
End: 2024-08-12
Payer: MEDICARE

## 2024-08-12 VITALS — WEIGHT: 225.7 LBS | BODY MASS INDEX: 41.28 KG/M2 | DIASTOLIC BLOOD PRESSURE: 86 MMHG | SYSTOLIC BLOOD PRESSURE: 130 MMHG

## 2024-08-12 DIAGNOSIS — M54.41 CHRONIC BILATERAL LOW BACK PAIN WITH BILATERAL SCIATICA: Primary | Chronic | ICD-10-CM

## 2024-08-12 DIAGNOSIS — Z79.891 LONG TERM (CURRENT) USE OF OPIATE ANALGESIC: ICD-10-CM

## 2024-08-12 DIAGNOSIS — M48.062 SPINAL STENOSIS OF LUMBAR REGION WITH NEUROGENIC CLAUDICATION: ICD-10-CM

## 2024-08-12 DIAGNOSIS — G89.29 CHRONIC BILATERAL LOW BACK PAIN WITH BILATERAL SCIATICA: Primary | Chronic | ICD-10-CM

## 2024-08-12 DIAGNOSIS — E66.01 CLASS 3 SEVERE OBESITY DUE TO EXCESS CALORIES WITH SERIOUS COMORBIDITY AND BODY MASS INDEX (BMI) OF 40.0 TO 44.9 IN ADULT (HCC): ICD-10-CM

## 2024-08-12 DIAGNOSIS — M54.42 CHRONIC BILATERAL LOW BACK PAIN WITH BILATERAL SCIATICA: Primary | Chronic | ICD-10-CM

## 2024-08-12 DIAGNOSIS — M16.0 PRIMARY OSTEOARTHRITIS OF BOTH HIPS: ICD-10-CM

## 2024-08-12 PROCEDURE — 99213 OFFICE O/P EST LOW 20 MIN: CPT | Performed by: FAMILY MEDICINE

## 2024-08-12 PROCEDURE — 1090F PRES/ABSN URINE INCON ASSESS: CPT | Performed by: FAMILY MEDICINE

## 2024-08-12 PROCEDURE — 1123F ACP DISCUSS/DSCN MKR DOCD: CPT | Performed by: FAMILY MEDICINE

## 2024-08-12 PROCEDURE — 1036F TOBACCO NON-USER: CPT | Performed by: FAMILY MEDICINE

## 2024-08-12 PROCEDURE — G8400 PT W/DXA NO RESULTS DOC: HCPCS | Performed by: FAMILY MEDICINE

## 2024-08-12 PROCEDURE — G8417 CALC BMI ABV UP PARAM F/U: HCPCS | Performed by: FAMILY MEDICINE

## 2024-08-12 PROCEDURE — G8427 DOCREV CUR MEDS BY ELIG CLIN: HCPCS | Performed by: FAMILY MEDICINE

## 2024-08-12 RX ORDER — OXYCODONE AND ACETAMINOPHEN 10; 325 MG/1; MG/1
1 TABLET ORAL EVERY 8 HOURS PRN
Qty: 85 TABLET | Refills: 0 | Status: SHIPPED | OUTPATIENT
Start: 2024-08-12 | End: 2024-09-11

## 2024-08-12 RX ORDER — TIZANIDINE 2 MG/1
2 TABLET ORAL 3 TIMES DAILY PRN
Qty: 60 TABLET | Refills: 0 | Status: SHIPPED | OUTPATIENT
Start: 2024-08-12

## 2024-08-12 ASSESSMENT — ENCOUNTER SYMPTOMS
BACK PAIN: 1
WHEEZING: 0
CONSTIPATION: 0
COUGH: 0
SHORTNESS OF BREATH: 0

## 2024-08-12 NOTE — ASSESSMENT & PLAN NOTE
She has stable chronic back pain and she is doing all right with slightly reduced amount of oxycodone.  I will represcribe the oxycodone that gives her more benefit than risk.  I am reducing by another 5 pills as part of my program to reduce the amount that I prescribed to anyone.  I reviewed the list of things that can help other than opioid medications on the opioid consent form and encouraged her to consider these.  Weight loss is another way that 1 can reduce pain and I encouraged weight loss  I will add a prescription of tizanidine as a muscle relaxer to see if that helps some with the back pain.

## 2024-08-12 NOTE — PROGRESS NOTES
8/12/24    Nereyda Reynaga  1945    SUBJECTIVE    HPI - Nereyda is a 79 y.o. female who presents today for evaluation of:  Chief Complaint   Patient presents with   • 1 Month Follow-Up     Pain  Oxycodone refill       Pain Hip : pain is more after lifting some cat litter. Still sleeps in recliner to care for sterp mother.     Review of Systems   Respiratory:  Negative for cough, shortness of breath and wheezing.    Cardiovascular:  Negative for chest pain.   Gastrointestinal:  Negative for constipation.   Musculoskeletal:  Positive for arthralgias and back pain.   Psychiatric/Behavioral:  Negative for suicidal ideas. The patient is not nervous/anxious.          Allergies   Allergen Reactions   • Latex    • Prednisone Hives        OBJECTIVE    /86 (Site: Left Upper Arm, Position: Sitting, Cuff Size: Medium Adult)   Wt 102.4 kg (225 lb 11.2 oz)   BMI 41.28 kg/m²     Physical Exam   Constitutional:       General: Not in acute distress.     Appearance: Normal appearance. Not ill-appearing.   Eyes:      General: No scleral icterus.  Cardiovascular:      Rate and Rhythm: Normal rate and regular rhythm.      Heart sounds: No murmur heard.   No friction rub. No gallop.    Pulmonary:      Effort: Pulmonary effort is normal. No respiratory distress.      Breath sounds: No wheezing, rhonchi or rales.   Abdominal:      Palpations: Abdomen is soft. There is no mass.      Tenderness: There is no abdominal tenderness.   Musculoskeletal:     Moves all extremities normally.  No pretibial edema.   Skin:     General: Skin is warm.      Coloration: Skin is not jaundiced.   Neurological:      Mental Status: Patient is alert.   Psychiatric:         Behavior: Behavior normal.         Thought Content: Thought content normal.         Judgment: Judgment normal.      Reviewed:  oxycodone 6/12/24 & 7/12/34 both 10/325 tid.     ASSESSMENT/PLAN:    1. Back pain, Chronic bilateral low back pain with bilateral sciatica  Assessment & Plan:

## 2024-08-13 LAB
AMPHETAMINES UR QL SCN>1000 NG/ML: ABNORMAL
BARBITURATES UR QL SCN>200 NG/ML: ABNORMAL
BENZODIAZ UR QL SCN>200 NG/ML: ABNORMAL
CANNABINOIDS UR QL SCN>50 NG/ML: ABNORMAL
COCAINE UR QL SCN: ABNORMAL
DRUG SCREEN COMMENT UR-IMP: ABNORMAL
FENTANYL SCREEN, URINE: ABNORMAL
METHADONE UR QL SCN>300 NG/ML: ABNORMAL
OPIATES UR QL SCN>300 NG/ML: ABNORMAL
OXYCODONE UR QL SCN: POSITIVE
PCP UR QL SCN>25 NG/ML: ABNORMAL
PH UR STRIP: 5.5 [PH]

## 2024-08-14 NOTE — ASSESSMENT & PLAN NOTE
She has lost a couple of pounds which is wonderful.  She understands that weight loss will help to reduce her pain.   Detail Level: Generalized Detail Level: Detailed

## 2024-09-09 ENCOUNTER — TELEPHONE (OUTPATIENT)
Dept: FAMILY MEDICINE CLINIC | Age: 79
End: 2024-09-09

## 2024-09-09 DIAGNOSIS — F51.04 PSYCHOPHYSIOLOGICAL INSOMNIA: ICD-10-CM

## 2024-09-10 RX ORDER — TRAZODONE HYDROCHLORIDE 100 MG/1
100 TABLET ORAL NIGHTLY
Qty: 30 TABLET | Refills: 0 | Status: SHIPPED | OUTPATIENT
Start: 2024-09-10 | End: 2024-09-11 | Stop reason: SDUPTHER

## 2024-09-11 ENCOUNTER — OFFICE VISIT (OUTPATIENT)
Dept: FAMILY MEDICINE CLINIC | Age: 79
End: 2024-09-11
Payer: MEDICARE

## 2024-09-11 VITALS
HEIGHT: 62 IN | BODY MASS INDEX: 40.45 KG/M2 | TEMPERATURE: 98.1 F | OXYGEN SATURATION: 97 % | HEART RATE: 49 BPM | WEIGHT: 219.8 LBS | DIASTOLIC BLOOD PRESSURE: 76 MMHG | SYSTOLIC BLOOD PRESSURE: 120 MMHG

## 2024-09-11 DIAGNOSIS — N18.31 STAGE 3A CHRONIC KIDNEY DISEASE (HCC): Chronic | ICD-10-CM

## 2024-09-11 DIAGNOSIS — M54.41 CHRONIC BILATERAL LOW BACK PAIN WITH BILATERAL SCIATICA: Chronic | ICD-10-CM

## 2024-09-11 DIAGNOSIS — F51.04 PSYCHOPHYSIOLOGICAL INSOMNIA: ICD-10-CM

## 2024-09-11 DIAGNOSIS — M54.42 CHRONIC BILATERAL LOW BACK PAIN WITH BILATERAL SCIATICA: Chronic | ICD-10-CM

## 2024-09-11 DIAGNOSIS — L89.310 PRESSURE INJURY OF RIGHT BUTTOCK, UNSTAGEABLE (HCC): ICD-10-CM

## 2024-09-11 DIAGNOSIS — G89.29 CHRONIC BILATERAL LOW BACK PAIN WITH BILATERAL SCIATICA: Chronic | ICD-10-CM

## 2024-09-11 DIAGNOSIS — Z23 IMMUNIZATION DUE: ICD-10-CM

## 2024-09-11 DIAGNOSIS — Z98.84 STATUS POST GASTRIC BYPASS FOR OBESITY: ICD-10-CM

## 2024-09-11 DIAGNOSIS — M48.062 SPINAL STENOSIS OF LUMBAR REGION WITH NEUROGENIC CLAUDICATION: Primary | ICD-10-CM

## 2024-09-11 DIAGNOSIS — E78.5 DYSLIPIDEMIA: ICD-10-CM

## 2024-09-11 DIAGNOSIS — J45.30 MILD PERSISTENT ASTHMA WITHOUT COMPLICATION: ICD-10-CM

## 2024-09-11 DIAGNOSIS — M16.0 PRIMARY OSTEOARTHRITIS OF BOTH HIPS: ICD-10-CM

## 2024-09-11 LAB
BASOPHILS # BLD: 0 K/UL (ref 0–0.2)
BASOPHILS NFR BLD: 0.4 %
DEPRECATED RDW RBC AUTO: 16.3 % (ref 12.4–15.4)
EOSINOPHIL # BLD: 0.1 K/UL (ref 0–0.6)
EOSINOPHIL NFR BLD: 1.2 %
HCT VFR BLD AUTO: 34.3 % (ref 36–48)
HGB BLD-MCNC: 10.9 G/DL (ref 12–16)
LYMPHOCYTES # BLD: 3.2 K/UL (ref 1–5.1)
LYMPHOCYTES NFR BLD: 46.5 %
MCH RBC QN AUTO: 25.4 PG (ref 26–34)
MCHC RBC AUTO-ENTMCNC: 31.8 G/DL (ref 31–36)
MCV RBC AUTO: 80 FL (ref 80–100)
MONOCYTES # BLD: 0.5 K/UL (ref 0–1.3)
MONOCYTES NFR BLD: 7.6 %
NEUTROPHILS # BLD: 3.1 K/UL (ref 1.7–7.7)
NEUTROPHILS NFR BLD: 44.3 %
PLATELET # BLD AUTO: 364 K/UL (ref 135–450)
PMV BLD AUTO: 8.7 FL (ref 5–10.5)
RBC # BLD AUTO: 4.28 M/UL (ref 4–5.2)
WBC # BLD AUTO: 6.9 K/UL (ref 4–11)

## 2024-09-11 PROCEDURE — 1090F PRES/ABSN URINE INCON ASSESS: CPT | Performed by: FAMILY MEDICINE

## 2024-09-11 PROCEDURE — 90653 IIV ADJUVANT VACCINE IM: CPT | Performed by: FAMILY MEDICINE

## 2024-09-11 PROCEDURE — 1036F TOBACCO NON-USER: CPT | Performed by: FAMILY MEDICINE

## 2024-09-11 PROCEDURE — G0008 ADMIN INFLUENZA VIRUS VAC: HCPCS | Performed by: FAMILY MEDICINE

## 2024-09-11 PROCEDURE — G8417 CALC BMI ABV UP PARAM F/U: HCPCS | Performed by: FAMILY MEDICINE

## 2024-09-11 PROCEDURE — 99214 OFFICE O/P EST MOD 30 MIN: CPT | Performed by: FAMILY MEDICINE

## 2024-09-11 PROCEDURE — 1123F ACP DISCUSS/DSCN MKR DOCD: CPT | Performed by: FAMILY MEDICINE

## 2024-09-11 PROCEDURE — 36415 COLL VENOUS BLD VENIPUNCTURE: CPT | Performed by: FAMILY MEDICINE

## 2024-09-11 PROCEDURE — G8400 PT W/DXA NO RESULTS DOC: HCPCS | Performed by: FAMILY MEDICINE

## 2024-09-11 PROCEDURE — G8427 DOCREV CUR MEDS BY ELIG CLIN: HCPCS | Performed by: FAMILY MEDICINE

## 2024-09-11 RX ORDER — OXYCODONE AND ACETAMINOPHEN 10; 325 MG/1; MG/1
1 TABLET ORAL EVERY 8 HOURS PRN
Qty: 83 TABLET | Refills: 0 | Status: SHIPPED | OUTPATIENT
Start: 2024-09-11 | End: 2024-10-11

## 2024-09-11 RX ORDER — FLUTICASONE FUROATE AND VILANTEROL 100; 25 UG/1; UG/1
1 POWDER RESPIRATORY (INHALATION) DAILY
Qty: 3 EACH | Refills: 3 | Status: SHIPPED | OUTPATIENT
Start: 2024-09-11

## 2024-09-11 RX ORDER — TRAZODONE HYDROCHLORIDE 100 MG/1
100 TABLET ORAL NIGHTLY
Qty: 30 TABLET | Refills: 3 | Status: SHIPPED | OUTPATIENT
Start: 2024-09-11

## 2024-09-11 ASSESSMENT — ENCOUNTER SYMPTOMS
DIARRHEA: 0
CONSTIPATION: 0

## 2024-09-12 DIAGNOSIS — D50.9 IRON DEFICIENCY ANEMIA, UNSPECIFIED IRON DEFICIENCY ANEMIA TYPE: Primary | ICD-10-CM

## 2024-09-12 DIAGNOSIS — G25.81 RLS (RESTLESS LEGS SYNDROME): ICD-10-CM

## 2024-09-12 LAB
ALBUMIN SERPL-MCNC: 4.2 G/DL (ref 3.4–5)
ALBUMIN/GLOB SERPL: 1.6 {RATIO} (ref 1.1–2.2)
ALP SERPL-CCNC: 104 U/L (ref 40–129)
ALT SERPL-CCNC: 14 U/L (ref 10–40)
ANION GAP SERPL CALCULATED.3IONS-SCNC: 11 MMOL/L (ref 3–16)
AST SERPL-CCNC: 19 U/L (ref 15–37)
BILIRUB SERPL-MCNC: <0.2 MG/DL (ref 0–1)
BUN SERPL-MCNC: 25 MG/DL (ref 7–20)
CALCIUM SERPL-MCNC: 9.3 MG/DL (ref 8.3–10.6)
CHLORIDE SERPL-SCNC: 104 MMOL/L (ref 99–110)
CHOLEST SERPL-MCNC: 212 MG/DL (ref 0–199)
CO2 SERPL-SCNC: 23 MMOL/L (ref 21–32)
CREAT SERPL-MCNC: 1.1 MG/DL (ref 0.6–1.2)
FOLATE SERPL-MCNC: 18.8 NG/ML (ref 4.78–24.2)
GFR SERPLBLD CREATININE-BSD FMLA CKD-EPI: 51 ML/MIN/{1.73_M2}
GLUCOSE SERPL-MCNC: 82 MG/DL (ref 70–99)
HDLC SERPL-MCNC: 80 MG/DL (ref 40–60)
IRON SATN MFR SERPL: 11 % (ref 15–50)
IRON SERPL-MCNC: 45 UG/DL (ref 37–145)
LDLC SERPL CALC-MCNC: 113 MG/DL
POTASSIUM SERPL-SCNC: 4.8 MMOL/L (ref 3.5–5.1)
PROT SERPL-MCNC: 6.8 G/DL (ref 6.4–8.2)
SODIUM SERPL-SCNC: 138 MMOL/L (ref 136–145)
TIBC SERPL-MCNC: 404 UG/DL (ref 260–445)
TRIGL SERPL-MCNC: 97 MG/DL (ref 0–150)
VIT B12 SERPL-MCNC: 504 PG/ML (ref 211–911)
VLDLC SERPL CALC-MCNC: 19 MG/DL

## 2024-09-12 RX ORDER — FERROUS SULFATE 325(65) MG
325 TABLET ORAL
Qty: 30 TABLET | Refills: 5 | Status: SHIPPED | OUTPATIENT
Start: 2024-09-12

## 2024-09-17 NOTE — ASSESSMENT & PLAN NOTE
I will continue to prescribe the oxycodone 7.5 mg tablets 3 times a day. She finds that it adequately controls her pain. PDMP was checked and did not show problematic use of controlled substances. See above

## 2024-10-02 ENCOUNTER — OFFICE VISIT (OUTPATIENT)
Dept: FAMILY MEDICINE CLINIC | Age: 79
End: 2024-10-02
Payer: MEDICARE

## 2024-10-02 VITALS
HEART RATE: 57 BPM | WEIGHT: 221.4 LBS | SYSTOLIC BLOOD PRESSURE: 124 MMHG | BODY MASS INDEX: 40.49 KG/M2 | DIASTOLIC BLOOD PRESSURE: 78 MMHG | OXYGEN SATURATION: 98 %

## 2024-10-02 DIAGNOSIS — L89.312 PRESSURE INJURY OF RIGHT BUTTOCK, STAGE 2 (HCC): Primary | ICD-10-CM

## 2024-10-02 DIAGNOSIS — G25.81 RLS (RESTLESS LEGS SYNDROME): ICD-10-CM

## 2024-10-02 DIAGNOSIS — M54.41 CHRONIC BILATERAL LOW BACK PAIN WITH BILATERAL SCIATICA: Chronic | ICD-10-CM

## 2024-10-02 DIAGNOSIS — E61.1 IRON DEFICIENCY: ICD-10-CM

## 2024-10-02 DIAGNOSIS — G89.29 CHRONIC BILATERAL LOW BACK PAIN WITH BILATERAL SCIATICA: Chronic | ICD-10-CM

## 2024-10-02 DIAGNOSIS — M54.42 CHRONIC BILATERAL LOW BACK PAIN WITH BILATERAL SCIATICA: Chronic | ICD-10-CM

## 2024-10-02 DIAGNOSIS — E66.01 CLASS 3 SEVERE OBESITY DUE TO EXCESS CALORIES WITH SERIOUS COMORBIDITY AND BODY MASS INDEX (BMI) OF 40.0 TO 44.9 IN ADULT: ICD-10-CM

## 2024-10-02 DIAGNOSIS — M16.0 PRIMARY OSTEOARTHRITIS OF BOTH HIPS: ICD-10-CM

## 2024-10-02 DIAGNOSIS — M48.062 SPINAL STENOSIS OF LUMBAR REGION WITH NEUROGENIC CLAUDICATION: ICD-10-CM

## 2024-10-02 DIAGNOSIS — D50.9 IRON DEFICIENCY ANEMIA, UNSPECIFIED IRON DEFICIENCY ANEMIA TYPE: ICD-10-CM

## 2024-10-02 DIAGNOSIS — E66.813 CLASS 3 SEVERE OBESITY DUE TO EXCESS CALORIES WITH SERIOUS COMORBIDITY AND BODY MASS INDEX (BMI) OF 40.0 TO 44.9 IN ADULT: ICD-10-CM

## 2024-10-02 DIAGNOSIS — F41.1 GAD (GENERALIZED ANXIETY DISORDER): ICD-10-CM

## 2024-10-02 PROBLEM — L89.310 PRESSURE INJURY OF RIGHT BUTTOCK, UNSTAGEABLE (HCC): Status: RESOLVED | Noted: 2024-09-11 | Resolved: 2024-10-02

## 2024-10-02 PROCEDURE — G8482 FLU IMMUNIZE ORDER/ADMIN: HCPCS | Performed by: FAMILY MEDICINE

## 2024-10-02 PROCEDURE — 99214 OFFICE O/P EST MOD 30 MIN: CPT | Performed by: FAMILY MEDICINE

## 2024-10-02 PROCEDURE — G8427 DOCREV CUR MEDS BY ELIG CLIN: HCPCS | Performed by: FAMILY MEDICINE

## 2024-10-02 PROCEDURE — G8417 CALC BMI ABV UP PARAM F/U: HCPCS | Performed by: FAMILY MEDICINE

## 2024-10-02 PROCEDURE — 1090F PRES/ABSN URINE INCON ASSESS: CPT | Performed by: FAMILY MEDICINE

## 2024-10-02 PROCEDURE — 1123F ACP DISCUSS/DSCN MKR DOCD: CPT | Performed by: FAMILY MEDICINE

## 2024-10-02 PROCEDURE — 1036F TOBACCO NON-USER: CPT | Performed by: FAMILY MEDICINE

## 2024-10-02 PROCEDURE — G8400 PT W/DXA NO RESULTS DOC: HCPCS | Performed by: FAMILY MEDICINE

## 2024-10-02 RX ORDER — ROPINIROLE 5 MG/1
5 TABLET, FILM COATED ORAL NIGHTLY
Qty: 90 TABLET | Refills: 2 | Status: SHIPPED | OUTPATIENT
Start: 2024-10-02

## 2024-10-02 RX ORDER — MUPIROCIN 20 MG/G
OINTMENT TOPICAL
Qty: 2 EACH | Refills: 1 | Status: SHIPPED | OUTPATIENT
Start: 2024-10-02 | End: 2024-10-09

## 2024-10-02 RX ORDER — OXYCODONE AND ACETAMINOPHEN 10; 325 MG/1; MG/1
1 TABLET ORAL EVERY 8 HOURS PRN
Qty: 82 TABLET | Refills: 0 | Status: SHIPPED | OUTPATIENT
Start: 2024-10-11 | End: 2024-11-10

## 2024-10-02 NOTE — ASSESSMENT & PLAN NOTE
I think iron deficiency is causing the restless leg syndrome.  Encouraged her to eat liver and to take the iron pills as tolerated.

## 2024-10-02 NOTE — ASSESSMENT & PLAN NOTE
She reports somewhat significant restless leg syndrome.  She would like to increase the dose of the ropinirole.  I will do that.  I strongly recommended that she get enough iron in her diet.  Her 9/11/2024 labs showed low iron.  I did not want to retest the iron today because that was a fairly recent test.  I think the iron deficiency is likely promoting the restless leg syndrome.

## 2024-10-02 NOTE — ASSESSMENT & PLAN NOTE
Continuing oxycodone and as part of my plan to reduce the maximum amount I prescribed anyone I am reducing her number of pills this month by 1 pill.

## 2024-10-02 NOTE — ASSESSMENT & PLAN NOTE
She did not want a referral to wound care at this time.  Recommended donut cushion and relieving pressure on the wounds.  Prescribing mupirocin ointment.  Pressure relief is the most important therapy.

## 2024-10-02 NOTE — PROGRESS NOTES
10/2/24    Nereyda Reynaga  1945    SUBJECTIVE    HPI - Nereyda is a 79 y.o. female who presents today for evaluation of:  Chief Complaint   Patient presents with    1 Month Follow-Up     Pain    Other     Sore spot on left buttock and leg fold.         Hip Pain : Hip pain is reasonably controlled. Cannot get up from floor well due to0 not being able to get on knees.      : Myrbetriq works well.     RLS : sxs recently worse.  She is taking iron pills but does not tolerate taking them every day    Swelling : uses triamterene/hct for swelling  when needed and it works.     Iron def anemia : takes iron pills some of the time. Likes liver.     Wound on buttock : Wound on buttocks that bothers her a lot.  She has tried to sleep on her side but has had difficulty with this.  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    Allergies   Allergen Reactions    Latex     Prednisone Hives        OBJECTIVE    /78 (Site: Left Upper Arm, Position: Sitting, Cuff Size: Medium Adult)   Pulse 57   Wt 100.4 kg (221 lb 6.4 oz)   SpO2 98%   BMI 40.49 kg/m²     Physical Exam   Constitutional:       General: Not in acute distress.     Appearance: Normal appearance. Not ill-appearing.   Eyes:      General: No scleral icterus.  Cardiovascular:      Rate and Rhythm: Normal rate and regular rhythm.      Heart sounds: No murmur heard.   No friction rub. No gallop.    Pulmonary:      Effort: Pulmonary effort is normal. No respiratory distress.      Breath sounds: No wheezing, rhonchi or rales.   Abdominal:      Palpations: Abdomen is soft. There is no mass.      Tenderness: There is no abdominal tenderness.   Musculoskeletal:     Moves all extremities normally.    Skin:     General: Skin is warm.      Coloration: Skin is not jaundiced. Chaperoned : Exam of skin of buttock reveals about a 1 cm ulcer grade 2 pressure sore right buttock.  Neurological:      Mental Status: Patient is

## 2024-10-02 NOTE — ASSESSMENT & PLAN NOTE
Continues to have pain from spinal stenosis.  So far she has tolerated reduction in Percocet.  I will reduce 1 more pill per month this month.

## 2024-11-01 ENCOUNTER — OFFICE VISIT (OUTPATIENT)
Dept: FAMILY MEDICINE CLINIC | Age: 79
End: 2024-11-01

## 2024-11-01 VITALS
OXYGEN SATURATION: 98 % | DIASTOLIC BLOOD PRESSURE: 74 MMHG | SYSTOLIC BLOOD PRESSURE: 120 MMHG | HEART RATE: 51 BPM | WEIGHT: 225.8 LBS | BODY MASS INDEX: 41.3 KG/M2 | TEMPERATURE: 97.3 F

## 2024-11-01 DIAGNOSIS — M54.41 CHRONIC BILATERAL LOW BACK PAIN WITH BILATERAL SCIATICA: Primary | Chronic | ICD-10-CM

## 2024-11-01 DIAGNOSIS — M48.062 SPINAL STENOSIS OF LUMBAR REGION WITH NEUROGENIC CLAUDICATION: ICD-10-CM

## 2024-11-01 DIAGNOSIS — E66.01 CLASS 3 SEVERE OBESITY DUE TO EXCESS CALORIES WITH SERIOUS COMORBIDITY AND BODY MASS INDEX (BMI) OF 40.0 TO 44.9 IN ADULT: ICD-10-CM

## 2024-11-01 DIAGNOSIS — G89.29 CHRONIC BILATERAL LOW BACK PAIN WITH BILATERAL SCIATICA: Primary | Chronic | ICD-10-CM

## 2024-11-01 DIAGNOSIS — M54.42 CHRONIC BILATERAL LOW BACK PAIN WITH BILATERAL SCIATICA: Primary | Chronic | ICD-10-CM

## 2024-11-01 DIAGNOSIS — L89.312 PRESSURE INJURY OF RIGHT BUTTOCK, STAGE 2 (HCC): ICD-10-CM

## 2024-11-01 DIAGNOSIS — G25.81 RLS (RESTLESS LEGS SYNDROME): ICD-10-CM

## 2024-11-01 DIAGNOSIS — D50.9 IRON DEFICIENCY ANEMIA, UNSPECIFIED IRON DEFICIENCY ANEMIA TYPE: ICD-10-CM

## 2024-11-01 DIAGNOSIS — F32.1 MAJOR DEPRESSIVE DISORDER, SINGLE EPISODE, MODERATE (HCC): ICD-10-CM

## 2024-11-01 DIAGNOSIS — E66.813 CLASS 3 SEVERE OBESITY DUE TO EXCESS CALORIES WITH SERIOUS COMORBIDITY AND BODY MASS INDEX (BMI) OF 40.0 TO 44.9 IN ADULT: ICD-10-CM

## 2024-11-01 DIAGNOSIS — Z86.73 HISTORY OF STROKE: ICD-10-CM

## 2024-11-01 DIAGNOSIS — M79.89 LEG SWELLING: ICD-10-CM

## 2024-11-01 DIAGNOSIS — M16.0 PRIMARY OSTEOARTHRITIS OF BOTH HIPS: ICD-10-CM

## 2024-11-01 DIAGNOSIS — F41.1 GAD (GENERALIZED ANXIETY DISORDER): ICD-10-CM

## 2024-11-01 DIAGNOSIS — R10.13 EPIGASTRIC PAIN: ICD-10-CM

## 2024-11-01 DIAGNOSIS — J30.9 ALLERGIC RHINITIS, UNSPECIFIED SEASONALITY, UNSPECIFIED TRIGGER: ICD-10-CM

## 2024-11-01 DIAGNOSIS — I48.91 ATRIAL FIBRILLATION, UNSPECIFIED TYPE (HCC): ICD-10-CM

## 2024-11-01 PROBLEM — I48.20 CHRONIC ATRIAL FIBRILLATION (HCC): Status: RESOLVED | Noted: 2024-02-21 | Resolved: 2024-11-01

## 2024-11-01 PROBLEM — F11.20 OPIOID DEPENDENCE WITH CURRENT USE (HCC): Status: RESOLVED | Noted: 2023-05-12 | Resolved: 2024-11-01

## 2024-11-01 RX ORDER — HYDROXYZINE HYDROCHLORIDE 25 MG/1
25 TABLET, FILM COATED ORAL EVERY 8 HOURS PRN
Qty: 60 TABLET | Refills: 2 | Status: SHIPPED | OUTPATIENT
Start: 2024-11-01

## 2024-11-01 RX ORDER — FERROUS SULFATE 325(65) MG
325 TABLET ORAL
Qty: 30 TABLET | Refills: 5 | Status: SHIPPED | OUTPATIENT
Start: 2024-11-01

## 2024-11-01 RX ORDER — TIZANIDINE 2 MG/1
2 TABLET ORAL 3 TIMES DAILY PRN
Qty: 60 TABLET | Refills: 2 | Status: SHIPPED | OUTPATIENT
Start: 2024-11-01

## 2024-11-01 RX ORDER — TRIAMTERENE AND HYDROCHLOROTHIAZIDE 37.5; 25 MG/1; MG/1
1 TABLET ORAL DAILY PRN
Qty: 30 TABLET | Refills: 0 | Status: SHIPPED | OUTPATIENT
Start: 2024-11-01

## 2024-11-01 RX ORDER — FLUTICASONE PROPIONATE 50 MCG
2 SPRAY, SUSPENSION (ML) NASAL DAILY
Qty: 16 G | Refills: 3 | Status: SHIPPED | OUTPATIENT
Start: 2024-11-01

## 2024-11-01 RX ORDER — PANTOPRAZOLE SODIUM 40 MG/1
40 TABLET, DELAYED RELEASE ORAL
Qty: 90 TABLET | Refills: 2 | Status: SHIPPED | OUTPATIENT
Start: 2024-11-01

## 2024-11-01 RX ORDER — OXYCODONE AND ACETAMINOPHEN 10; 325 MG/1; MG/1
1 TABLET ORAL EVERY 8 HOURS PRN
Qty: 81 TABLET | Refills: 0 | Status: SHIPPED | OUTPATIENT
Start: 2024-11-10 | End: 2024-12-10

## 2024-11-01 ASSESSMENT — PATIENT HEALTH QUESTIONNAIRE - PHQ9
2. FEELING DOWN, DEPRESSED OR HOPELESS: NOT AT ALL
SUM OF ALL RESPONSES TO PHQ QUESTIONS 1-9: 8
3. TROUBLE FALLING OR STAYING ASLEEP: SEVERAL DAYS
SUM OF ALL RESPONSES TO PHQ QUESTIONS 1-9: 8
6. FEELING BAD ABOUT YOURSELF - OR THAT YOU ARE A FAILURE OR HAVE LET YOURSELF OR YOUR FAMILY DOWN: NOT AT ALL
7. TROUBLE CONCENTRATING ON THINGS, SUCH AS READING THE NEWSPAPER OR WATCHING TELEVISION: NOT AT ALL
SUM OF ALL RESPONSES TO PHQ9 QUESTIONS 1 & 2: 2
SUM OF ALL RESPONSES TO PHQ QUESTIONS 1-9: 8
8. MOVING OR SPEAKING SO SLOWLY THAT OTHER PEOPLE COULD HAVE NOTICED. OR THE OPPOSITE, BEING SO FIGETY OR RESTLESS THAT YOU HAVE BEEN MOVING AROUND A LOT MORE THAN USUAL: MORE THAN HALF THE DAYS
9. THOUGHTS THAT YOU WOULD BE BETTER OFF DEAD, OR OF HURTING YOURSELF: NOT AT ALL
1. LITTLE INTEREST OR PLEASURE IN DOING THINGS: MORE THAN HALF THE DAYS
4. FEELING TIRED OR HAVING LITTLE ENERGY: MORE THAN HALF THE DAYS
SUM OF ALL RESPONSES TO PHQ QUESTIONS 1-9: 8
5. POOR APPETITE OR OVEREATING: SEVERAL DAYS

## 2024-11-01 ASSESSMENT — ENCOUNTER SYMPTOMS
CONSTIPATION: 0
VOMITING: 0
ANAL BLEEDING: 0
BLOOD IN STOOL: 0
DIARRHEA: 0

## 2024-11-01 NOTE — ASSESSMENT & PLAN NOTE
Stable continuing oxycodone and as part of my plan to reduce the maximum amount I prescribed anyone I am reducing her number of pills this month by 1 pill.

## 2024-11-01 NOTE — PROGRESS NOTES
11/1/24    Nereyda Reynaga  1945    SUBJECTIVE    HPI - Nereyda is a 79 y.o. female who presents today for evaluation of:  Chief Complaint   Patient presents with    1 Month Follow-Up     Back pain- refills    Sore     Patient states sore on right buttock hasn't healed at all      Mood :       11/1/2024    11:39 AM 1/9/2024    10:33 AM 12/13/2023     1:34 PM 5/12/2023     4:42 PM 12/13/2022     1:21 PM 11/8/2022     4:01 PM 12/10/2021     9:56 AM   PHQ Scores   PHQ2 Score 2 1 0 0 4 0 6   PHQ9 Score 8 4 0 0 6 1 23     Interpretation of Total Score Depression Severity: 1-4 = Minimal depression, 5-9 = Mild depression, 10-14 = Moderate depression, 15-19 = Moderately severe depression, 20-27 = Severe depression  Sertraline works well.     Hip pain : bad recently. Oxycodone is helping. She does nto feel she has any opioid tolerance.     Sore : has sore buttock not healing.     Hx of stroke : tia many yr ago    Cardiovascular: When I asked her if she was taking a blood thinner for atrial fibrillation she said that she has had testing with the cardiology department that determined that she does not have atrial fibrillation.    Review of Systems   Constitutional:  Negative for chills and fever.   Gastrointestinal:  Negative for anal bleeding, blood in stool, constipation, diarrhea and vomiting.         Allergies   Allergen Reactions    Latex     Prednisone Hives        OBJECTIVE    /74 (Site: Left Upper Arm, Position: Sitting, Cuff Size: Large Adult)   Pulse 51   Temp 97.3 °F (36.3 °C) (Infrared)   Wt 102.4 kg (225 lb 12.8 oz)   SpO2 98%   BMI 41.30 kg/m²     Physical Exam   Constitutional:       General: Not in acute distress.     Appearance: Normal appearance. Not ill-appearing.   Eyes:      General: No scleral icterus.  Cardiovascular:      Rate and Rhythm: Normal rate and regular rhythm.      Heart sounds: No murmur heard.   No friction rub. No gallop.    Pulmonary:      Effort: Pulmonary effort is normal. No

## 2024-11-01 NOTE — ASSESSMENT & PLAN NOTE
Refilling pantoprazole.  The pantoprazole should protect her stomach if she does need aspirin or a stronger blood thinner related to atrial fibrillation.

## 2024-11-01 NOTE — ASSESSMENT & PLAN NOTE
She has back pain related to spinal stenosis.  Oxycodone helps control her pain and she has taken oxycodone for some time.  I am gradually weaning back the amount of oxycodone I prescribed to anyone and will reduce the dose slightly.  Tizanidine also helps and I will prescribe that.

## 2024-11-01 NOTE — ASSESSMENT & PLAN NOTE
Referred to cardiologist to find out if she really has atrial fibrillation and to make sure that she understands what she has or does not have and if anything needs to be done about it.  I asked her to take an aspirin a day until she finds out a definitive plan from her cardiologist.

## 2024-11-01 NOTE — ASSESSMENT & PLAN NOTE
Added history of stroke since she reports a history many years ago of a TIA or stroke when sitting in hospital with another ill family member.

## 2024-11-01 NOTE — ASSESSMENT & PLAN NOTE
Most recent CBC shows mild anemia.  Encouraged her to take iron.  She is hesitant about taking a blood thinner if she has atrial fibrillation due to being anemic.

## 2024-11-01 NOTE — ASSESSMENT & PLAN NOTE
I will refer to wound clinic.  I do not think an antibiotic that she requested would actually help with the healing process.

## 2024-11-01 NOTE — ASSESSMENT & PLAN NOTE
Continues to have pain from spinal stenosis.  Stable.   I will reduce 1 more pill per month this month.

## 2024-11-04 ENCOUNTER — TELEPHONE (OUTPATIENT)
Dept: WOUND CARE | Age: 79
End: 2024-11-04

## 2024-11-04 NOTE — TELEPHONE ENCOUNTER
spoke with Mady after no answer on pt phone. they will return call this afternoon to get the appt scheduled

## 2024-11-06 ENCOUNTER — TELEPHONE (OUTPATIENT)
Dept: CARDIOLOGY CLINIC | Age: 79
End: 2024-11-06

## 2024-11-06 NOTE — TELEPHONE ENCOUNTER
Unable to leave a vm for pt to call back; no vm set up.     Patient can schedule an OV with Dr. Killian. Last Ov was on 02/21/24.     Referral by      Dx: Atrial fibrillation,unspecified type. ( HCC)

## 2024-11-06 NOTE — PATIENT INSTRUCTIONS
PHYSICIAN ORDERS AND DISCHARGE INSTRUCTIONS    Wound cleansing:     Do not scrub or use excessive force.   Wash hands with soap and water before and after dressing changes.   Prior to applying a clean dressing, cleanse wound with normal saline,               wound cleanser, or mild soap and water.    Ask the physician or nurse before getting the wound(s) wet in a shower      Wound Care Notes:           Orders for this week:  2024       Right Buttocks - Wash with soap and water, pat dry.   Apply Desitin, Stimulen, Lidocaine, and Clobetasol to wound bed.  Qwick to periwound.  Cover with 4x4 silicone island border and tegaderm.  Leave in place up to 1 week (until dressing comes off or until it becomes soile) then may apply creams daily without a covering.    Dispense 30 day quantity when ordering supplies.  Plan:       Nurse Visit:   Follow Up Instructions: At the Wound Care Center in 1 week   Primary Wound Care Provider: Bryanna Ramos CNP   Call  for any questions or concerns.  Central [phone] for imaging and lab work

## 2024-11-07 ENCOUNTER — HOSPITAL ENCOUNTER (OUTPATIENT)
Dept: WOUND CARE | Age: 79
Discharge: HOME | End: 2024-11-07
Attending: NURSE PRACTITIONER
Payer: COMMERCIAL

## 2024-11-07 VITALS
SYSTOLIC BLOOD PRESSURE: 201 MMHG | RESPIRATION RATE: 16 BRPM | DIASTOLIC BLOOD PRESSURE: 82 MMHG | TEMPERATURE: 97.8 F | HEART RATE: 57 BPM

## 2024-11-07 DIAGNOSIS — E66.813 CLASS 3 SEVERE OBESITY DUE TO EXCESS CALORIES WITH SERIOUS COMORBIDITY AND BODY MASS INDEX (BMI) OF 40.0 TO 44.9 IN ADULT: ICD-10-CM

## 2024-11-07 DIAGNOSIS — E66.01 CLASS 3 SEVERE OBESITY DUE TO EXCESS CALORIES WITH SERIOUS COMORBIDITY AND BODY MASS INDEX (BMI) OF 40.0 TO 44.9 IN ADULT: ICD-10-CM

## 2024-11-07 DIAGNOSIS — L89.312 PRESSURE INJURY OF RIGHT BUTTOCK, STAGE 2 (HCC): Primary | ICD-10-CM

## 2024-11-07 DIAGNOSIS — R73.03 PREDIABETES: ICD-10-CM

## 2024-11-07 PROCEDURE — 99203 OFFICE O/P NEW LOW 30 MIN: CPT | Performed by: NURSE PRACTITIONER

## 2024-11-07 PROCEDURE — 11042 DBRDMT SUBQ TIS 1ST 20SQCM/<: CPT | Performed by: NURSE PRACTITIONER

## 2024-11-07 PROCEDURE — 11042 DBRDMT SUBQ TIS 1ST 20SQCM/<: CPT

## 2024-11-07 PROCEDURE — 99213 OFFICE O/P EST LOW 20 MIN: CPT

## 2024-11-07 RX ORDER — LIDOCAINE 50 MG/G
OINTMENT TOPICAL ONCE
OUTPATIENT
Start: 2024-11-07 | End: 2024-11-07

## 2024-11-07 RX ORDER — CLOBETASOL PROPIONATE 0.5 MG/G
OINTMENT TOPICAL ONCE
OUTPATIENT
Start: 2024-11-07 | End: 2024-11-07

## 2024-11-07 RX ORDER — NEOMYCIN/BACITRACIN/POLYMYXINB 3.5-400-5K
OINTMENT (GRAM) TOPICAL ONCE
OUTPATIENT
Start: 2024-11-07 | End: 2024-11-07

## 2024-11-07 RX ORDER — LIDOCAINE HYDROCHLORIDE 20 MG/ML
JELLY TOPICAL ONCE
OUTPATIENT
Start: 2024-11-07 | End: 2024-11-07

## 2024-11-07 RX ORDER — GENTAMICIN SULFATE 1 MG/G
OINTMENT TOPICAL ONCE
OUTPATIENT
Start: 2024-11-07 | End: 2024-11-07

## 2024-11-07 RX ORDER — BACITRACIN ZINC AND POLYMYXIN B SULFATE 500; 1000 [USP'U]/G; [USP'U]/G
OINTMENT TOPICAL ONCE
OUTPATIENT
Start: 2024-11-07 | End: 2024-11-07

## 2024-11-07 RX ORDER — MUPIROCIN 20 MG/G
OINTMENT TOPICAL ONCE
OUTPATIENT
Start: 2024-11-07 | End: 2024-11-07

## 2024-11-07 RX ORDER — LIDOCAINE 40 MG/G
CREAM TOPICAL ONCE
OUTPATIENT
Start: 2024-11-07 | End: 2024-11-07

## 2024-11-07 RX ORDER — BACITRACIN ZINC 500 [USP'U]/G
OINTMENT TOPICAL ONCE
OUTPATIENT
Start: 2024-11-07 | End: 2024-11-07

## 2024-11-07 RX ORDER — SILVER SULFADIAZINE 10 MG/G
CREAM TOPICAL ONCE
OUTPATIENT
Start: 2024-11-07 | End: 2024-11-07

## 2024-11-07 RX ORDER — OXYCODONE AND ACETAMINOPHEN 10; 325 MG/1; MG/1
1 TABLET ORAL EVERY 8 HOURS PRN
COMMUNITY

## 2024-11-07 RX ORDER — SODIUM CHLOR/HYPOCHLOROUS ACID 0.033 %
SOLUTION, IRRIGATION IRRIGATION ONCE
OUTPATIENT
Start: 2024-11-07 | End: 2024-11-07

## 2024-11-07 RX ORDER — LIDOCAINE HYDROCHLORIDE 40 MG/ML
SOLUTION TOPICAL ONCE
OUTPATIENT
Start: 2024-11-07 | End: 2024-11-07

## 2024-11-07 ASSESSMENT — PAIN DESCRIPTION - ORIENTATION: ORIENTATION: RIGHT

## 2024-11-07 ASSESSMENT — PAIN DESCRIPTION - LOCATION: LOCATION: BUTTOCKS

## 2024-11-07 ASSESSMENT — PAIN SCALES - GENERAL: PAINLEVEL_OUTOF10: 10

## 2024-11-07 ASSESSMENT — PAIN DESCRIPTION - FREQUENCY: FREQUENCY: CONTINUOUS

## 2024-11-07 ASSESSMENT — PAIN DESCRIPTION - DESCRIPTORS: DESCRIPTORS: SORE

## 2024-11-07 NOTE — PROGRESS NOTES
Neuropathy: signs and therapy.  Foot care: advised to wash feet daily, pat dry and apply lotion at night, avoiding between toes. Need to look at feet daily and report to a physician any signs of inflammation or skin damage. Discussed diabetes shoes and socks.  Diabetic management related to wound healing    Smoking:   Tobacco Use      Smoking status: Never      Smokeless tobacco: Never      Anticoagulant/Antiplatelet therapy: [x] N/A [] Aspirin [] Plavix    []Warfarin/Coumadin [] Eliquis/Apixaban   [] Rivaroxaban/Xarelto  [] Dabigatran/ Pradaxa [] Edoxaban/Lixiana    Immunosuppression: [x] No [] Yes    Obesity:  BMI Readings from Last 3 Encounters:   11/01/24 41.30 kg/m²   10/02/24 40.49 kg/m²   09/11/24 40.20 kg/m²        Patient educated on the 6 essential components necessary for wound healing: Circulation, Debridements, Proper Dressings and Topical Wound Products, Infection Control, Edema Control and Offloading.     Patient educated on those factors that negatively effect or impact wound healing: smoking, obesity, uncontrolled diabetes, anticoagulant and immunosuppressive regimens, inadequate nutrition, untreated arterial and venous disease if applicable and measures to manage edema.      Nutritional status: well nourished. Discussed need for increased protein and calories for wound healing and good sources of protein (just over 7 grams for every 20 pounds of body weight). Animal-based foods high in protein (meat, poultry, fish, eggs, and dairy foods). Plant based foods high in protein (tofu, lentils, beans, chickpeas, nuts, quinoa and albert seeds.     Lab Results   Component Value Date     09/11/2024    K 4.8 09/11/2024     09/11/2024    CO2 23 09/11/2024    BUN 25 (H) 09/11/2024    CREATININE 1.1 09/11/2024    GLUCOSE 82 09/11/2024    CALCIUM 9.3 09/11/2024    BILITOT <0.2 09/11/2024    ALKPHOS 104 09/11/2024    AST 19 09/11/2024    ALT 14 09/11/2024    LABGLOM 51 (A) 09/11/2024    GFRAA >60

## 2024-11-11 ENCOUNTER — TELEPHONE (OUTPATIENT)
Dept: FAMILY MEDICINE CLINIC | Age: 79
End: 2024-11-11

## 2024-11-11 ENCOUNTER — TELEPHONE (OUTPATIENT)
Dept: CARDIOLOGY CLINIC | Age: 79
End: 2024-11-11

## 2024-11-11 DIAGNOSIS — M54.42 CHRONIC BILATERAL LOW BACK PAIN WITH BILATERAL SCIATICA: Primary | ICD-10-CM

## 2024-11-11 DIAGNOSIS — G89.29 CHRONIC BILATERAL LOW BACK PAIN WITH BILATERAL SCIATICA: Primary | ICD-10-CM

## 2024-11-11 DIAGNOSIS — M54.41 CHRONIC BILATERAL LOW BACK PAIN WITH BILATERAL SCIATICA: Primary | ICD-10-CM

## 2024-11-11 RX ORDER — OXYCODONE AND ACETAMINOPHEN 10; 325 MG/1; MG/1
1 TABLET ORAL EVERY 8 HOURS PRN
Qty: 82 TABLET | Refills: 0 | Status: SHIPPED | OUTPATIENT
Start: 2024-11-11 | End: 2024-12-11

## 2024-11-11 NOTE — TELEPHONE ENCOUNTER
Patient had prescription for her oxycodone sent for yesterday 11/10/2024 to pharmacy by Dr. Og at her 11/1/2024 appointment but wound clinic discontinued medication on 11/7/2024 will you please send order to [facility]

## 2024-11-11 NOTE — TELEPHONE ENCOUNTER
Unable to leave a vm; vm not set up.     Patient can be scheduled an OV with Dr. Killian ( last ov was on 02/21/24.     Referral by Dr. Jacob Og     DX: Atrial fibrillation,unspecified type ( HCC)

## 2024-11-14 ENCOUNTER — TELEPHONE (OUTPATIENT)
Dept: CARDIOLOGY CLINIC | Age: 79
End: 2024-11-14

## 2024-11-14 ENCOUNTER — HOSPITAL ENCOUNTER (OUTPATIENT)
Dept: WOUND CARE | Age: 79
Discharge: HOME | End: 2024-11-14
Attending: NURSE PRACTITIONER

## 2024-11-14 NOTE — PATIENT INSTRUCTIONS
PHYSICIAN ORDERS AND DISCHARGE INSTRUCTIONS    Wound cleansing:     Do not scrub or use excessive force.   Wash hands with soap and water before and after dressing changes.   Prior to applying a clean dressing, cleanse wound with normal saline,               wound cleanser, or mild soap and water.    Ask the physician or nurse before getting the wound(s) wet in a shower      Wound Care Notes:           Orders for this week:  2024       Right Buttocks - Wash with soap and water, pat dry.   Apply Desitin, Stimulen, Lidocaine, and Clobetasol to wound bed.  Qwick to periwound.  Cover with 4x4 silicone island border and tegaderm.  Leave in place up to 1 week (until dressing comes off or until it becomes soile) then may apply creams daily without a covering.    Dispense 30 day quantity when ordering supplies.  Plan:       Nurse Visit:   Follow Up Instructions: At the Wound Care Center in 1 week   Primary Wound Care Provider: Bryanna Ramos CNP   Call  for any questions or concerns.  Central [phone] for imaging and lab work   alert and awake

## 2024-11-14 NOTE — TELEPHONE ENCOUNTER
Unable to leave a voicemail; pt does not have a voicemail set up.     Patient can scheduled an OV with Dr. Killian ( last ov was on 02/21/24).     Referral by Dr.Raymond Og     DX: Atrial fibrillation,unspecified type.(HCC)

## 2024-11-19 ENCOUNTER — HOSPITAL ENCOUNTER (OUTPATIENT)
Dept: WOUND CARE | Age: 79
Discharge: HOME | End: 2024-11-19
Attending: NURSE PRACTITIONER
Payer: COMMERCIAL

## 2024-11-19 VITALS
HEART RATE: 63 BPM | TEMPERATURE: 97.2 F | DIASTOLIC BLOOD PRESSURE: 87 MMHG | SYSTOLIC BLOOD PRESSURE: 161 MMHG | RESPIRATION RATE: 16 BRPM

## 2024-11-19 DIAGNOSIS — N39.41 URGE INCONTINENCE: ICD-10-CM

## 2024-11-19 DIAGNOSIS — R73.03 PREDIABETES: ICD-10-CM

## 2024-11-19 DIAGNOSIS — L89.312 PRESSURE INJURY OF RIGHT BUTTOCK, STAGE 2 (HCC): Primary | ICD-10-CM

## 2024-11-19 PROCEDURE — 11042 DBRDMT SUBQ TIS 1ST 20SQCM/<: CPT | Performed by: NURSE PRACTITIONER

## 2024-11-19 PROCEDURE — 6370000000 HC RX 637 (ALT 250 FOR IP): Performed by: NURSE PRACTITIONER

## 2024-11-19 PROCEDURE — 11042 DBRDMT SUBQ TIS 1ST 20SQCM/<: CPT

## 2024-11-19 RX ORDER — SILVER SULFADIAZINE 10 MG/G
CREAM TOPICAL ONCE
OUTPATIENT
Start: 2024-11-19 | End: 2024-11-19

## 2024-11-19 RX ORDER — GENTAMICIN SULFATE 1 MG/G
OINTMENT TOPICAL ONCE
OUTPATIENT
Start: 2024-11-19 | End: 2024-11-19

## 2024-11-19 RX ORDER — CLOBETASOL PROPIONATE 0.5 MG/G
OINTMENT TOPICAL ONCE
Status: DISCONTINUED | OUTPATIENT
Start: 2024-11-19 | End: 2024-11-20 | Stop reason: HOSPADM

## 2024-11-19 RX ORDER — MUPIROCIN 20 MG/G
OINTMENT TOPICAL ONCE
OUTPATIENT
Start: 2024-11-19 | End: 2024-11-19

## 2024-11-19 RX ORDER — LIDOCAINE 40 MG/G
CREAM TOPICAL ONCE
OUTPATIENT
Start: 2024-11-19 | End: 2024-11-19

## 2024-11-19 RX ORDER — LIDOCAINE HYDROCHLORIDE 20 MG/ML
JELLY TOPICAL ONCE
OUTPATIENT
Start: 2024-11-19 | End: 2024-11-19

## 2024-11-19 RX ORDER — NEOMYCIN/BACITRACIN/POLYMYXINB 3.5-400-5K
OINTMENT (GRAM) TOPICAL ONCE
OUTPATIENT
Start: 2024-11-19 | End: 2024-11-19

## 2024-11-19 RX ORDER — LIDOCAINE HYDROCHLORIDE 40 MG/ML
SOLUTION TOPICAL ONCE
OUTPATIENT
Start: 2024-11-19 | End: 2024-11-19

## 2024-11-19 RX ORDER — LIDOCAINE 50 MG/G
OINTMENT TOPICAL ONCE
Status: DISCONTINUED | OUTPATIENT
Start: 2024-11-19 | End: 2024-11-20 | Stop reason: HOSPADM

## 2024-11-19 RX ORDER — LIDOCAINE 50 MG/G
OINTMENT TOPICAL ONCE
OUTPATIENT
Start: 2024-11-19 | End: 2024-11-19

## 2024-11-19 RX ORDER — SODIUM CHLOR/HYPOCHLOROUS ACID 0.033 %
SOLUTION, IRRIGATION IRRIGATION ONCE
OUTPATIENT
Start: 2024-11-19 | End: 2024-11-19

## 2024-11-19 RX ORDER — BACITRACIN ZINC 500 [USP'U]/G
OINTMENT TOPICAL ONCE
OUTPATIENT
Start: 2024-11-19 | End: 2024-11-19

## 2024-11-19 RX ORDER — BACITRACIN ZINC AND POLYMYXIN B SULFATE 500; 1000 [USP'U]/G; [USP'U]/G
OINTMENT TOPICAL ONCE
OUTPATIENT
Start: 2024-11-19 | End: 2024-11-19

## 2024-11-19 RX ORDER — CLOBETASOL PROPIONATE 0.5 MG/G
OINTMENT TOPICAL ONCE
OUTPATIENT
Start: 2024-11-19 | End: 2024-11-19

## 2024-11-19 ASSESSMENT — PAIN SCALES - GENERAL: PAINLEVEL_OUTOF10: 8

## 2024-11-19 ASSESSMENT — PAIN DESCRIPTION - ORIENTATION: ORIENTATION: RIGHT

## 2024-11-19 ASSESSMENT — PAIN DESCRIPTION - FREQUENCY: FREQUENCY: CONTINUOUS

## 2024-11-19 ASSESSMENT — PAIN DESCRIPTION - DESCRIPTORS: DESCRIPTORS: STABBING

## 2024-11-19 ASSESSMENT — PAIN DESCRIPTION - LOCATION: LOCATION: BUTTOCKS

## 2024-11-19 NOTE — PROGRESS NOTES
appropriate care would include: periodic debridement and wound care as below.     Problem List Items Addressed This Visit          Other    Urge incontinence (Chronic)    Relevant Medications    lidocaine (XYLOCAINE) 5 % ointment    clobetasol (TEMOVATE) 0.05 % ointment    Other Relevant Orders    Initiate Outpatient Wound Care Protocol    Prediabetes, formerly DM2 prior ot gastric bypass.     Relevant Medications    lidocaine (XYLOCAINE) 5 % ointment    clobetasol (TEMOVATE) 0.05 % ointment    Other Relevant Orders    Initiate Outpatient Wound Care Protocol    Pressure injury of right buttock, stage 2 (HCC) - Primary    Relevant Medications    lidocaine (XYLOCAINE) 5 % ointment    clobetasol (TEMOVATE) 0.05 % ointment    Other Relevant Orders    Initiate Outpatient Wound Care Protocol       Procedure Note    Indications:  Based on my examination of this patient's wound(s) today, sharp excision into necrotic subcutaneous tissue is required to promote healing and evaluate the extent of previous healing.    Performed by: RUSSELL Donald - CNP    Consent obtained: Yes    Time out taken:  Yes    Pain Control:  2% Lidocaine spray    Debridement:Excisional Debridement    Using curette the wound(s) was/were sharply debrided down through and including the removal of subcutaneous tissue.        Devitalized Tissue Debrided:  fibrin, biofilm, slough, and exudate    Pre Debridement Measurements:  Are located in the Wound Documentation Flow Sheet    All active wounds listed below with today's date are evaluated  Wound(s) debrided this date include # : 1     Post  Debridement Measurements:  Wound 11/07/24 #1 Right Buttock (Active)   Wound Image   11/07/24 1535   Wound Etiology Pressure Stage 3 11/07/24 1535   Dressing Status New dressing applied 11/07/24 1633   Wound Cleansed Soap and water;Wound cleanser 11/19/24 1500   Dressing/Treatment Other (comment) 11/07/24 1633   Wound Length (cm) 1.8 cm 11/19/24 1500   Wound Width

## 2024-11-19 NOTE — PATIENT INSTRUCTIONS
PHYSICIAN ORDERS AND DISCHARGE INSTRUCTIONS    Wound cleansing:     Do not scrub or use excessive force.   Wash hands with soap and water before and after dressing changes.   Prior to applying a clean dressing, cleanse wound with normal saline,               wound cleanser, or mild soap and water.    Ask the physician or nurse before getting the wound(s) wet in a shower      Wound Care Notes:           Orders for this week:  2024       Right Buttocks - Wash with soap and water, pat dry.   Apply Desitin, Stimulen, Lidocaine, and Clobetasol to wound bed.  Qwick to periwound.  Cover with 4x4 silicone island border and tegaderm.  Leave in place up to 1 week (until dressing comes off or until it becomes soile) then may apply creams daily without a covering.    Dispense 30 day quantity when ordering supplies.  Plan:       Nurse Visit:   Follow Up Instructions: At the Wound Care Center in 1 week   Primary Wound Care Provider: Bryanna Ramos CNP   Call  for any questions or concerns.  Central [phone] for imaging and lab work

## 2024-11-26 ENCOUNTER — HOSPITAL ENCOUNTER (OUTPATIENT)
Dept: WOUND CARE | Age: 79
Discharge: HOME | End: 2024-11-26
Attending: NURSE PRACTITIONER
Payer: COMMERCIAL

## 2024-11-26 VITALS
DIASTOLIC BLOOD PRESSURE: 65 MMHG | HEART RATE: 59 BPM | SYSTOLIC BLOOD PRESSURE: 148 MMHG | RESPIRATION RATE: 16 BRPM | TEMPERATURE: 98.1 F

## 2024-11-26 DIAGNOSIS — L89.312 PRESSURE INJURY OF RIGHT BUTTOCK, STAGE 2 (HCC): Primary | ICD-10-CM

## 2024-11-26 DIAGNOSIS — R73.03 PREDIABETES: ICD-10-CM

## 2024-11-26 DIAGNOSIS — N39.41 URGE INCONTINENCE: ICD-10-CM

## 2024-11-26 PROCEDURE — 11042 DBRDMT SUBQ TIS 1ST 20SQCM/<: CPT

## 2024-11-26 PROCEDURE — 11042 DBRDMT SUBQ TIS 1ST 20SQCM/<: CPT | Performed by: NURSE PRACTITIONER

## 2024-11-26 PROCEDURE — 6370000000 HC RX 637 (ALT 250 FOR IP): Performed by: NURSE PRACTITIONER

## 2024-11-26 RX ORDER — LIDOCAINE HYDROCHLORIDE 20 MG/ML
JELLY TOPICAL ONCE
OUTPATIENT
Start: 2024-11-26 | End: 2024-11-26

## 2024-11-26 RX ORDER — CLOBETASOL PROPIONATE 0.5 MG/G
OINTMENT TOPICAL ONCE
Status: COMPLETED | OUTPATIENT
Start: 2024-11-26 | End: 2024-11-26

## 2024-11-26 RX ORDER — LIDOCAINE 50 MG/G
OINTMENT TOPICAL ONCE
Status: COMPLETED | OUTPATIENT
Start: 2024-11-26 | End: 2024-11-26

## 2024-11-26 RX ORDER — SODIUM CHLOR/HYPOCHLOROUS ACID 0.033 %
SOLUTION, IRRIGATION IRRIGATION ONCE
OUTPATIENT
Start: 2024-11-26 | End: 2024-11-26

## 2024-11-26 RX ORDER — LIDOCAINE 40 MG/G
CREAM TOPICAL ONCE
OUTPATIENT
Start: 2024-11-26 | End: 2024-11-26

## 2024-11-26 RX ORDER — LIDOCAINE HYDROCHLORIDE 40 MG/ML
SOLUTION TOPICAL ONCE
OUTPATIENT
Start: 2024-11-26 | End: 2024-11-26

## 2024-11-26 RX ORDER — CLOBETASOL PROPIONATE 0.5 MG/G
OINTMENT TOPICAL ONCE
OUTPATIENT
Start: 2024-11-26 | End: 2024-11-26

## 2024-11-26 RX ORDER — MUPIROCIN 20 MG/G
OINTMENT TOPICAL ONCE
OUTPATIENT
Start: 2024-11-26 | End: 2024-11-26

## 2024-11-26 RX ORDER — LIDOCAINE 50 MG/G
OINTMENT TOPICAL ONCE
OUTPATIENT
Start: 2024-11-26 | End: 2024-11-26

## 2024-11-26 RX ORDER — BACITRACIN ZINC 500 [USP'U]/G
OINTMENT TOPICAL ONCE
OUTPATIENT
Start: 2024-11-26 | End: 2024-11-26

## 2024-11-26 RX ORDER — GENTAMICIN SULFATE 1 MG/G
OINTMENT TOPICAL ONCE
OUTPATIENT
Start: 2024-11-26 | End: 2024-11-26

## 2024-11-26 RX ORDER — SILVER SULFADIAZINE 10 MG/G
CREAM TOPICAL ONCE
OUTPATIENT
Start: 2024-11-26 | End: 2024-11-26

## 2024-11-26 RX ORDER — NEOMYCIN/BACITRACIN/POLYMYXINB 3.5-400-5K
OINTMENT (GRAM) TOPICAL ONCE
OUTPATIENT
Start: 2024-11-26 | End: 2024-11-26

## 2024-11-26 RX ORDER — BACITRACIN ZINC AND POLYMYXIN B SULFATE 500; 1000 [USP'U]/G; [USP'U]/G
OINTMENT TOPICAL ONCE
OUTPATIENT
Start: 2024-11-26 | End: 2024-11-26

## 2024-11-26 RX ADMIN — LIDOCAINE: 50 OINTMENT TOPICAL at 16:12

## 2024-11-26 RX ADMIN — CLOBETASOL PROPIONATE: 0.5 OINTMENT TOPICAL at 16:12

## 2024-11-26 ASSESSMENT — PAIN DESCRIPTION - ORIENTATION: ORIENTATION: RIGHT

## 2024-11-26 ASSESSMENT — PAIN DESCRIPTION - DESCRIPTORS: DESCRIPTORS: STABBING

## 2024-11-26 ASSESSMENT — PAIN DESCRIPTION - LOCATION: LOCATION: BUTTOCKS

## 2024-11-26 ASSESSMENT — PAIN - FUNCTIONAL ASSESSMENT
PAIN_FUNCTIONAL_ASSESSMENT: PREVENTS OR INTERFERES SOME ACTIVE ACTIVITIES AND ADLS
PAIN_FUNCTIONAL_ASSESSMENT: PREVENTS OR INTERFERES SOME ACTIVE ACTIVITIES AND ADLS

## 2024-11-26 ASSESSMENT — PAIN DESCRIPTION - FREQUENCY: FREQUENCY: CONTINUOUS

## 2024-11-26 ASSESSMENT — PAIN DESCRIPTION - PAIN TYPE: TYPE: ACUTE PAIN

## 2024-11-26 NOTE — PATIENT INSTRUCTIONS
PHYSICIAN ORDERS AND DISCHARGE INSTRUCTIONS    Wound cleansing:     Do not scrub or use excessive force.   Wash hands with soap and water before and after dressing changes.   Prior to applying a clean dressing, cleanse wound with normal saline,               wound cleanser, or mild soap and water.    Ask the physician or nurse before getting the wound(s) wet in a shower      Wound Care Notes:           Orders for this week:  2024       Right Buttocks - Wash with soap and water, pat dry.   Apply Desitin, Stimulen, Lidocaine, and Clobetasol to wound bed.  Qwick to periwound.  Cover with 4x4 silicone island border and tegaderm.  Leave in place up to 1 week (until dressing comes off or until it becomes soile) then may apply creams daily without a covering.    Dispense 30 day quantity when ordering supplies.  Plan:       Nurse Visit:   Follow Up Instructions: Nurse visit 1 week. Provider visit 2 weeks   Primary Wound Care Provider: Bryanna Ramos CNP   Call  for any questions or concerns.  Central [phone] for imaging and lab work   Pulmonary    See note (progress note) 11/2/20    Matt Jain MD

## 2024-11-26 NOTE — PROGRESS NOTES
Wound Care Center Progress Visit      Nereyda Reynaga  AGE: 79 y.o.   GENDER: female  : 1945  EPISODE DATE:  2024   Referred by: Jacob Og MD     Subjective:     CHIEF COMPLAINT  WOUND   Problem List Items Addressed This Visit          Other    Urge incontinence (Chronic)    Relevant Orders    Initiate Outpatient Wound Care Protocol    Prediabetes, formerly DM2 prior ot gastric bypass.     Relevant Orders    Initiate Outpatient Wound Care Protocol    Pressure injury of right buttock, stage 2 (HCC) - Primary    Relevant Orders    Initiate Outpatient Wound Care Protocol     Chief Complaint   Patient presents with    Wound Check        HISTORY of PRESENT ILLNESS      Nereyda Reynaga is a 79 y.o. female who presents to the Wound Clinic for evaluation and treatment of Acute pressure  ulcer(s) of  right buttock.  The condition is of moderate severity. The ulcer has been present for approximately 2 weeks at initial visit to the wound clinic 24.  The underlying cause is thought to be pressure.  The patients care to date has included evaluation per PCP with referral to the wound clinic.  Advanced wound care modalities established with initiation of care at the wound clinic.The patient has significant underlying medical conditions as below. Jacob Og MD .    Living Situation  [x] Home [] SNF []  Assisted living  [] Other (ie rehab, etc.) [] Home Health  []  Transportation    Wound Pain Timing/Severity: waxing and waning, moderate  Quality of pain: aching, tender, pressure  Severity of pain:  3 / 10   Modifying Factors: chronic pressure, decreased mobility, and shear force  Associated Signs/Symptoms: erythema, drainage, and pain    Wound status: 2024     Regimen discussed and established with patient/family as below. The patients records were reviewed and discussed.  Time was given for questions. All questions were answered to the patients satisfaction.      [x] Stable [] Worse [] Reopened

## 2024-12-03 ENCOUNTER — HOSPITAL ENCOUNTER (OUTPATIENT)
Dept: WOUND CARE | Age: 79
Discharge: HOME | End: 2024-12-03
Attending: NURSE PRACTITIONER
Payer: COMMERCIAL

## 2024-12-03 VITALS
SYSTOLIC BLOOD PRESSURE: 149 MMHG | TEMPERATURE: 97.4 F | DIASTOLIC BLOOD PRESSURE: 69 MMHG | RESPIRATION RATE: 16 BRPM | HEART RATE: 58 BPM

## 2024-12-03 PROCEDURE — 99213 OFFICE O/P EST LOW 20 MIN: CPT

## 2024-12-03 NOTE — PATIENT INSTRUCTIONS
PHYSICIAN ORDERS AND DISCHARGE INSTRUCTIONS    Wound cleansing:     Do not scrub or use excessive force.   Wash hands with soap and water before and after dressing changes.   Prior to applying a clean dressing, cleanse wound with normal saline,               wound cleanser, or mild soap and water.    Ask the physician or nurse before getting the wound(s) wet in a shower      Wound Care Notes:           Orders for this week:  12/3/2024       Right Buttocks - Wash with soap and water, pat dry.   Apply Desitin, Stimulen, Lidocaine, and Clobetasol to wound bed.  Qwick to periwound.  Cover with 4x4 silicone island border and tegaderm.  Leave in place up to 1 week (until dressing comes off or until it becomes soile) then may apply creams daily without a covering.    Dispense 30 day quantity when ordering supplies.  Plan:        Follow Up Instructions: 1 week. Wednesday   Primary Wound Care Provider: Bryanna Ramos CNP   Call  for any questions or concerns.  Central [phone] for imaging and lab work

## 2024-12-10 ENCOUNTER — OFFICE VISIT (OUTPATIENT)
Dept: FAMILY MEDICINE CLINIC | Age: 79
End: 2024-12-10
Payer: COMMERCIAL

## 2024-12-10 VITALS
HEART RATE: 71 BPM | BODY MASS INDEX: 40.68 KG/M2 | DIASTOLIC BLOOD PRESSURE: 82 MMHG | WEIGHT: 222.4 LBS | OXYGEN SATURATION: 96 % | SYSTOLIC BLOOD PRESSURE: 128 MMHG

## 2024-12-10 DIAGNOSIS — E66.813 CLASS 3 SEVERE OBESITY DUE TO EXCESS CALORIES WITH SERIOUS COMORBIDITY AND BODY MASS INDEX (BMI) OF 40.0 TO 44.9 IN ADULT: ICD-10-CM

## 2024-12-10 DIAGNOSIS — E66.01 CLASS 3 SEVERE OBESITY DUE TO EXCESS CALORIES WITH SERIOUS COMORBIDITY AND BODY MASS INDEX (BMI) OF 40.0 TO 44.9 IN ADULT: ICD-10-CM

## 2024-12-10 DIAGNOSIS — M16.0 PRIMARY OSTEOARTHRITIS OF BOTH HIPS: ICD-10-CM

## 2024-12-10 DIAGNOSIS — M54.41 CHRONIC BILATERAL LOW BACK PAIN WITH BILATERAL SCIATICA: Primary | ICD-10-CM

## 2024-12-10 DIAGNOSIS — N18.31 STAGE 3A CHRONIC KIDNEY DISEASE (HCC): Chronic | ICD-10-CM

## 2024-12-10 DIAGNOSIS — M54.42 CHRONIC BILATERAL LOW BACK PAIN WITH BILATERAL SCIATICA: Primary | ICD-10-CM

## 2024-12-10 DIAGNOSIS — L89.312 PRESSURE INJURY OF RIGHT BUTTOCK, STAGE 2 (HCC): ICD-10-CM

## 2024-12-10 DIAGNOSIS — Z79.891 LONG TERM (CURRENT) USE OF OPIATE ANALGESIC: ICD-10-CM

## 2024-12-10 DIAGNOSIS — F33.41 RECURRENT MAJOR DEPRESSIVE DISORDER, IN PARTIAL REMISSION (HCC): ICD-10-CM

## 2024-12-10 DIAGNOSIS — G89.29 CHRONIC BILATERAL LOW BACK PAIN WITH BILATERAL SCIATICA: Primary | ICD-10-CM

## 2024-12-10 PROBLEM — F32.1 MAJOR DEPRESSIVE DISORDER, SINGLE EPISODE, MODERATE (HCC): Status: RESOLVED | Noted: 2024-11-01 | Resolved: 2024-12-10

## 2024-12-10 PROBLEM — F32.1 MAJOR DEPRESSIVE DISORDER, SINGLE EPISODE, MODERATE (HCC): Chronic | Status: ACTIVE | Noted: 2024-11-01

## 2024-12-10 PROBLEM — G45.9 TIA (TRANSIENT ISCHEMIC ATTACK): Status: RESOLVED | Noted: 2017-06-17 | Resolved: 2024-12-10

## 2024-12-10 PROCEDURE — G8400 PT W/DXA NO RESULTS DOC: HCPCS | Performed by: FAMILY MEDICINE

## 2024-12-10 PROCEDURE — 99214 OFFICE O/P EST MOD 30 MIN: CPT | Performed by: FAMILY MEDICINE

## 2024-12-10 PROCEDURE — G8417 CALC BMI ABV UP PARAM F/U: HCPCS | Performed by: FAMILY MEDICINE

## 2024-12-10 PROCEDURE — 1123F ACP DISCUSS/DSCN MKR DOCD: CPT | Performed by: FAMILY MEDICINE

## 2024-12-10 PROCEDURE — 1036F TOBACCO NON-USER: CPT | Performed by: FAMILY MEDICINE

## 2024-12-10 PROCEDURE — G8482 FLU IMMUNIZE ORDER/ADMIN: HCPCS | Performed by: FAMILY MEDICINE

## 2024-12-10 PROCEDURE — 1090F PRES/ABSN URINE INCON ASSESS: CPT | Performed by: FAMILY MEDICINE

## 2024-12-10 PROCEDURE — G8427 DOCREV CUR MEDS BY ELIG CLIN: HCPCS | Performed by: FAMILY MEDICINE

## 2024-12-10 RX ORDER — DULOXETIN HYDROCHLORIDE 30 MG/1
30 CAPSULE, DELAYED RELEASE ORAL DAILY
Qty: 30 CAPSULE | Refills: 0 | Status: SHIPPED | OUTPATIENT
Start: 2024-12-10

## 2024-12-10 RX ORDER — OXYCODONE AND ACETAMINOPHEN 10; 325 MG/1; MG/1
1 TABLET ORAL EVERY 8 HOURS PRN
Qty: 82 TABLET | Refills: 0 | Status: SHIPPED | OUTPATIENT
Start: 2024-12-11 | End: 2025-01-10

## 2024-12-10 NOTE — PROGRESS NOTES
12/10/24    Nereyda Reynaga  1945    SUBJECTIVE    HPI Emily Dawn is a 79 y.o. female who presents today for evaluation of:  Chief Complaint   Patient presents with    1 Month Follow-Up     Pain     History of Present Illness  The patient presents for pain control.    Hip and back pain: She reports experiencing pain in her hip, back, and leg, describing it as a sensation that extends from head to toe. She has been managing this pain with oxycodone, taking 2.5 to 3 tablets daily. However, she notes that the current dosage is less effective than when she was prescribed a higher quantity of pills per month. She expresses concern about the potential reduction in her medication dosage and questions the efficacy of physical therapy in her case. She also mentions that she has previously sought help from various professionals without success. She recalls a negative experience with pregabalin, which induced unpleasant dreams. She declines further involvement with pain management due to previous experiences of medication reduction. She is currently attempting weight loss and believes that abdominal fat reduction would be beneficial. She consumes beverages without sugar.    Depression: She is currently on Zoloft for anxiety and depression. She is uncertain about the impact of caring for her mother on her depressive symptoms.    TIA history: She has a history of transient ischemic attack (TIA) many years ago.    Pressure sore: She has a pressure ulcer on her right buttock, which is almost healed, and she is following up at the wound clinic.    ALLERGIES  The patient has an intolerance to DAIRY.    MEDICATIONS  Current: oxycodone, sertraline      Allergies   Allergen Reactions    Latex     Prednisone Hives        OBJECTIVE    /82 (Site: Left Upper Arm, Position: Sitting, Cuff Size: Medium Adult)   Pulse 71   Wt 100.9 kg (222 lb 6.4 oz)   SpO2 96%   BMI 40.68 kg/m²     Physical Exam   Constitutional:       General: Not in

## 2024-12-11 ENCOUNTER — HOSPITAL ENCOUNTER (OUTPATIENT)
Dept: WOUND CARE | Age: 79
Discharge: HOME | End: 2024-12-11
Attending: NURSE PRACTITIONER

## 2024-12-11 NOTE — PATIENT INSTRUCTIONS
PHYSICIAN ORDERS AND DISCHARGE INSTRUCTIONS    Wound cleansing:     Do not scrub or use excessive force.   Wash hands with soap and water before and after dressing changes.   Prior to applying a clean dressing, cleanse wound with normal saline,               wound cleanser, or mild soap and water.    Ask the physician or nurse before getting the wound(s) wet in a shower      Wound Care Notes:           Orders for this week:  2024       Right Buttocks - Wash with soap and water, pat dry.   Apply Desitin, Stimulen, Lidocaine, and Clobetasol to wound bed.  Qwick to periwound.  Cover with 4x4 silicone island border and tegaderm.  Leave in place up to 1 week (until dressing comes off or until it becomes soile) then may apply creams daily without a covering.    Dispense 30 day quantity when ordering supplies.  Plan:        Follow Up Instructions: 1 week. Wednesday   Primary Wound Care Provider: Bryanna Ramos CNP   Call  for any questions or concerns.  Central [phone] for imaging and lab work

## 2024-12-17 NOTE — PATIENT INSTRUCTIONS
PHYSICIAN ORDERS AND DISCHARGE INSTRUCTIONS    Wound cleansing:     Do not scrub or use excessive force.   Wash hands with soap and water before and after dressing changes.   Prior to applying a clean dressing, cleanse wound with normal saline,               wound cleanser, or mild soap and water.    Ask the physician or nurse before getting the wound(s) wet in a shower      Wound Care Notes:           Orders for this week:  2024       Right Buttocks - Wash with soap and water, pat dry.   Apply Desitin, Stimulen, Lidocaine, and Clobetasol to wound bed.  Qwick to periwound.  Cover with 4x4 silicone island border and tegaderm.  Leave in place up to 1 week (until dressing comes off or until it becomes soile) then may apply creams daily without a covering.    Dispense 30 day quantity when ordering supplies.  Plan:        Follow Up Instructions: 2 week  Primary Wound Care Provider: Bryanna Ramos CNP   Call  for any questions or concerns.  Central [phone] for imaging and lab work

## 2024-12-19 ENCOUNTER — HOSPITAL ENCOUNTER (OUTPATIENT)
Dept: WOUND CARE | Age: 79
Discharge: HOME | End: 2024-12-19
Attending: NURSE PRACTITIONER
Payer: COMMERCIAL

## 2024-12-19 VITALS
DIASTOLIC BLOOD PRESSURE: 78 MMHG | HEART RATE: 57 BPM | SYSTOLIC BLOOD PRESSURE: 171 MMHG | TEMPERATURE: 97.2 F | RESPIRATION RATE: 18 BRPM

## 2024-12-19 PROCEDURE — 11042 DBRDMT SUBQ TIS 1ST 20SQCM/<: CPT

## 2024-12-19 ASSESSMENT — PAIN SCALES - GENERAL: PAINLEVEL_OUTOF10: 0

## 2024-12-19 ASSESSMENT — PAIN DESCRIPTION - LOCATION: LOCATION: BUTTOCKS

## 2024-12-24 DIAGNOSIS — M79.89 LEG SWELLING: ICD-10-CM

## 2024-12-24 RX ORDER — TRIAMTERENE AND HYDROCHLOROTHIAZIDE 37.5; 25 MG/1; MG/1
1 TABLET ORAL DAILY PRN
Qty: 30 TABLET | Refills: 0 | Status: SHIPPED | OUTPATIENT
Start: 2024-12-24

## 2025-01-02 ENCOUNTER — HOSPITAL ENCOUNTER (OUTPATIENT)
Dept: WOUND CARE | Age: 80
Discharge: HOME | End: 2025-01-02
Attending: NURSE PRACTITIONER

## 2025-01-02 NOTE — PATIENT INSTRUCTIONS
PHYSICIAN ORDERS AND DISCHARGE INSTRUCTIONS    Wound cleansing:     Do not scrub or use excessive force.   Wash hands with soap and water before and after dressing changes.   Prior to applying a clean dressing, cleanse wound with normal saline,               wound cleanser, or mild soap and water.    Ask the physician or nurse before getting the wound(s) wet in a shower      Wound Care Notes:           Orders for this week:  2025       Right Buttocks - Wash with soap and water, pat dry.   Apply Desitin, Stimulen, Lidocaine, and Clobetasol to wound bed.  Qwick to periwound.  Cover with 4x4 silicone island border and tegaderm.  Leave in place up to 1 week (until dressing comes off or until it becomes soile) then may apply creams daily without a covering.    Dispense 30 day quantity when ordering supplies.  Plan:        Follow Up Instructions: 1 week  Primary Wound Care Provider: Bryanna Ramos CNP   Call  for any questions or concerns.  Central [phone] for imaging and lab work

## 2025-01-03 ENCOUNTER — TELEPHONE (OUTPATIENT)
Dept: FAMILY MEDICINE CLINIC | Age: 80
End: 2025-01-03

## 2025-01-03 NOTE — TELEPHONE ENCOUNTER
Patient called asking for something for leg cramps.  She stated they are sporadic but she didn't sleep well last night due to them    Please advise

## 2025-01-07 ASSESSMENT — PATIENT HEALTH QUESTIONNAIRE - PHQ9
SUM OF ALL RESPONSES TO PHQ9 QUESTIONS 1 & 2: 4
SUM OF ALL RESPONSES TO PHQ QUESTIONS 1-9: 9
1. LITTLE INTEREST OR PLEASURE IN DOING THINGS: SEVERAL DAYS
SUM OF ALL RESPONSES TO PHQ QUESTIONS 1-9: 9
10. IF YOU CHECKED OFF ANY PROBLEMS, HOW DIFFICULT HAVE THESE PROBLEMS MADE IT FOR YOU TO DO YOUR WORK, TAKE CARE OF THINGS AT HOME, OR GET ALONG WITH OTHER PEOPLE: SOMEWHAT DIFFICULT
10. IF YOU CHECKED OFF ANY PROBLEMS, HOW DIFFICULT HAVE THESE PROBLEMS MADE IT FOR YOU TO DO YOUR WORK, TAKE CARE OF THINGS AT HOME, OR GET ALONG WITH OTHER PEOPLE: SOMEWHAT DIFFICULT
9. THOUGHTS THAT YOU WOULD BE BETTER OFF DEAD, OR OF HURTING YOURSELF: NOT AT ALL
6. FEELING BAD ABOUT YOURSELF - OR THAT YOU ARE A FAILURE OR HAVE LET YOURSELF OR YOUR FAMILY DOWN: NOT AT ALL
3. TROUBLE FALLING OR STAYING ASLEEP: SEVERAL DAYS
8. MOVING OR SPEAKING SO SLOWLY THAT OTHER PEOPLE COULD HAVE NOTICED. OR THE OPPOSITE, BEING SO FIGETY OR RESTLESS THAT YOU HAVE BEEN MOVING AROUND A LOT MORE THAN USUAL: NOT AT ALL
1. LITTLE INTEREST OR PLEASURE IN DOING THINGS: SEVERAL DAYS
6. FEELING BAD ABOUT YOURSELF - OR THAT YOU ARE A FAILURE OR HAVE LET YOURSELF OR YOUR FAMILY DOWN: NOT AT ALL
SUM OF ALL RESPONSES TO PHQ QUESTIONS 1-9: 9
4. FEELING TIRED OR HAVING LITTLE ENERGY: MORE THAN HALF THE DAYS
3. TROUBLE FALLING OR STAYING ASLEEP: SEVERAL DAYS
5. POOR APPETITE OR OVEREATING: SEVERAL DAYS
4. FEELING TIRED OR HAVING LITTLE ENERGY: MORE THAN HALF THE DAYS
2. FEELING DOWN, DEPRESSED OR HOPELESS: NEARLY EVERY DAY
8. MOVING OR SPEAKING SO SLOWLY THAT OTHER PEOPLE COULD HAVE NOTICED. OR THE OPPOSITE - BEING SO FIDGETY OR RESTLESS THAT YOU HAVE BEEN MOVING AROUND A LOT MORE THAN USUAL: NOT AT ALL
5. POOR APPETITE OR OVEREATING: SEVERAL DAYS
SUM OF ALL RESPONSES TO PHQ QUESTIONS 1-9: 9
7. TROUBLE CONCENTRATING ON THINGS, SUCH AS READING THE NEWSPAPER OR WATCHING TELEVISION: SEVERAL DAYS
2. FEELING DOWN, DEPRESSED OR HOPELESS: NEARLY EVERY DAY
9. THOUGHTS THAT YOU WOULD BE BETTER OFF DEAD, OR OF HURTING YOURSELF: NOT AT ALL
7. TROUBLE CONCENTRATING ON THINGS, SUCH AS READING THE NEWSPAPER OR WATCHING TELEVISION: SEVERAL DAYS
SUM OF ALL RESPONSES TO PHQ QUESTIONS 1-9: 9

## 2025-01-08 ENCOUNTER — TELEPHONE (OUTPATIENT)
Dept: WOUND CARE | Age: 80
End: 2025-01-08

## 2025-01-13 ENCOUNTER — OFFICE VISIT (OUTPATIENT)
Dept: FAMILY MEDICINE CLINIC | Age: 80
End: 2025-01-13
Payer: COMMERCIAL

## 2025-01-13 VITALS
DIASTOLIC BLOOD PRESSURE: 80 MMHG | BODY MASS INDEX: 41.81 KG/M2 | TEMPERATURE: 97 F | OXYGEN SATURATION: 96 % | SYSTOLIC BLOOD PRESSURE: 130 MMHG | WEIGHT: 228.6 LBS | HEART RATE: 62 BPM

## 2025-01-13 DIAGNOSIS — M54.41 CHRONIC BILATERAL LOW BACK PAIN WITH BILATERAL SCIATICA: Chronic | ICD-10-CM

## 2025-01-13 DIAGNOSIS — R00.1 BRADYCARDIA ON ECG: ICD-10-CM

## 2025-01-13 DIAGNOSIS — M54.42 CHRONIC BILATERAL LOW BACK PAIN WITH BILATERAL SCIATICA: Chronic | ICD-10-CM

## 2025-01-13 DIAGNOSIS — M16.0 PRIMARY OSTEOARTHRITIS OF BOTH HIPS: ICD-10-CM

## 2025-01-13 DIAGNOSIS — G89.29 CHRONIC BILATERAL LOW BACK PAIN WITH BILATERAL SCIATICA: Chronic | ICD-10-CM

## 2025-01-13 DIAGNOSIS — N18.31 STAGE 3A CHRONIC KIDNEY DISEASE (HCC): ICD-10-CM

## 2025-01-13 DIAGNOSIS — M48.062 SPINAL STENOSIS OF LUMBAR REGION WITH NEUROGENIC CLAUDICATION: ICD-10-CM

## 2025-01-13 DIAGNOSIS — I48.0 PAROXYSMAL ATRIAL FIBRILLATION (HCC): Primary | ICD-10-CM

## 2025-01-13 DIAGNOSIS — Z63.6 CAREGIVER STRESS: ICD-10-CM

## 2025-01-13 PROBLEM — U07.1 COVID-19: Status: RESOLVED | Noted: 2021-09-29 | Resolved: 2025-01-13

## 2025-01-13 PROCEDURE — G8417 CALC BMI ABV UP PARAM F/U: HCPCS | Performed by: FAMILY MEDICINE

## 2025-01-13 PROCEDURE — 93000 ELECTROCARDIOGRAM COMPLETE: CPT | Performed by: FAMILY MEDICINE

## 2025-01-13 PROCEDURE — 99214 OFFICE O/P EST MOD 30 MIN: CPT | Performed by: FAMILY MEDICINE

## 2025-01-13 PROCEDURE — 1090F PRES/ABSN URINE INCON ASSESS: CPT | Performed by: FAMILY MEDICINE

## 2025-01-13 PROCEDURE — 1123F ACP DISCUSS/DSCN MKR DOCD: CPT | Performed by: FAMILY MEDICINE

## 2025-01-13 PROCEDURE — 1036F TOBACCO NON-USER: CPT | Performed by: FAMILY MEDICINE

## 2025-01-13 PROCEDURE — G8427 DOCREV CUR MEDS BY ELIG CLIN: HCPCS | Performed by: FAMILY MEDICINE

## 2025-01-13 PROCEDURE — G8400 PT W/DXA NO RESULTS DOC: HCPCS | Performed by: FAMILY MEDICINE

## 2025-01-13 RX ORDER — ASPIRIN 81 MG/1
81 TABLET ORAL DAILY
Qty: 90 TABLET | Refills: 0 | Status: SHIPPED | OUTPATIENT
Start: 2025-01-13

## 2025-01-13 RX ORDER — OXYCODONE AND ACETAMINOPHEN 10; 325 MG/1; MG/1
1 TABLET ORAL EVERY 8 HOURS PRN
Qty: 82 TABLET | Refills: 0 | Status: SHIPPED | OUTPATIENT
Start: 2025-01-13 | End: 2025-02-12

## 2025-01-13 SDOH — ECONOMIC STABILITY: FOOD INSECURITY: WITHIN THE PAST 12 MONTHS, YOU WORRIED THAT YOUR FOOD WOULD RUN OUT BEFORE YOU GOT MONEY TO BUY MORE.: NEVER TRUE

## 2025-01-13 SDOH — ECONOMIC STABILITY: FOOD INSECURITY: WITHIN THE PAST 12 MONTHS, THE FOOD YOU BOUGHT JUST DIDN'T LAST AND YOU DIDN'T HAVE MONEY TO GET MORE.: NEVER TRUE

## 2025-01-13 SDOH — SOCIAL STABILITY - SOCIAL INSECURITY: DEPENDENT RELATIVE NEEDING CARE AT HOME: Z63.6

## 2025-01-13 NOTE — PROGRESS NOTES
1/13/25    Nereyda Ryenaga  1945    SUBJECTIVE    HPI - Nereyda is a 79 y.o. female who presents today for evaluation of:  Chief Complaint   Patient presents with    Follow-up     Hip pain, back pain, mood      History of Present Illness  The patient is a 79-year-old female who presents for pain management.  I want to discuss her pressure ulcer, atrial fibrillation, and kidney disease    She reports that her pressure ulcer has completely healed.    She has not experienced any symptoms of atrial fibrillation and has not sought consultation with a cardiologist. She was previously prescribed Eliquis but discontinued its use due to her belief that it was unnecessary.  She states some tests were done that did not show atrial fibrillation.  She expresses reluctance towards the use of blood thinners and declines referrals to other cardiologists. She acknowledges the potential risk of stroke associated with atrial fibrillation. Her current medication regimen includes aspirin.    She reports persistent back pain.  I have been reducing the number of pills per month as part of the plan to reduce those for all my patients but her last 3 prescriptions for oxycodone have all dispensed #82 pills.  However in effect she has had some reduction in dose since her last couple of visits have been more than 30 days apart from 1 visit to the next.    She is currently experiencing stress related to her mother.    MEDICATIONS  Current: aspirin  Discontinued: Eliquis      Allergies   Allergen Reactions    Latex     Prednisone Hives        OBJECTIVE    /80 (Site: Left Upper Arm, Position: Sitting, Cuff Size: Medium Adult)   Pulse 62   Temp 97 °F (36.1 °C) (Infrared)   Wt 103.7 kg (228 lb 9.6 oz)   SpO2 96%   BMI 41.81 kg/m²     Physical Exam   Constitutional:       General: Not in acute distress.     Appearance: Normal appearance. Not ill-appearing.   Eyes:      General: No scleral icterus.  Cardiovascular:      Rate and Rhythm: has

## 2025-01-16 ENCOUNTER — TELEPHONE (OUTPATIENT)
Dept: WOUND CARE | Age: 80
End: 2025-01-16

## 2025-01-17 ENCOUNTER — TELEPHONE (OUTPATIENT)
Dept: WOUND CARE | Age: 80
End: 2025-01-17

## 2025-01-27 ENCOUNTER — TELEPHONE (OUTPATIENT)
Dept: WOUND CARE | Age: 80
End: 2025-01-27

## 2025-02-04 ENCOUNTER — TELEPHONE (OUTPATIENT)
Dept: WOUND CARE | Age: 80
End: 2025-02-04

## 2025-02-13 ENCOUNTER — OFFICE VISIT (OUTPATIENT)
Dept: FAMILY MEDICINE CLINIC | Age: 80
End: 2025-02-13

## 2025-02-13 VITALS
HEART RATE: 51 BPM | OXYGEN SATURATION: 97 % | HEIGHT: 62 IN | WEIGHT: 227.6 LBS | BODY MASS INDEX: 41.88 KG/M2 | DIASTOLIC BLOOD PRESSURE: 84 MMHG | SYSTOLIC BLOOD PRESSURE: 132 MMHG

## 2025-02-13 DIAGNOSIS — G89.29 CHRONIC BILATERAL LOW BACK PAIN WITH BILATERAL SCIATICA: Chronic | ICD-10-CM

## 2025-02-13 DIAGNOSIS — I48.0 PAROXYSMAL ATRIAL FIBRILLATION (HCC): ICD-10-CM

## 2025-02-13 DIAGNOSIS — M54.42 CHRONIC BILATERAL LOW BACK PAIN WITH BILATERAL SCIATICA: Chronic | ICD-10-CM

## 2025-02-13 DIAGNOSIS — J45.30 MILD PERSISTENT ASTHMA WITHOUT COMPLICATION: ICD-10-CM

## 2025-02-13 DIAGNOSIS — D50.9 IRON DEFICIENCY ANEMIA, UNSPECIFIED IRON DEFICIENCY ANEMIA TYPE: ICD-10-CM

## 2025-02-13 DIAGNOSIS — B96.89 ACUTE BACTERIAL SINUSITIS: ICD-10-CM

## 2025-02-13 DIAGNOSIS — M48.062 SPINAL STENOSIS OF LUMBAR REGION WITH NEUROGENIC CLAUDICATION: ICD-10-CM

## 2025-02-13 DIAGNOSIS — M16.0 PRIMARY OSTEOARTHRITIS OF BOTH HIPS: ICD-10-CM

## 2025-02-13 DIAGNOSIS — F41.1 GAD (GENERALIZED ANXIETY DISORDER): Chronic | ICD-10-CM

## 2025-02-13 DIAGNOSIS — M79.89 LEG SWELLING: ICD-10-CM

## 2025-02-13 DIAGNOSIS — Z63.6 CAREGIVER STRESS: ICD-10-CM

## 2025-02-13 DIAGNOSIS — M54.41 CHRONIC BILATERAL LOW BACK PAIN WITH BILATERAL SCIATICA: Chronic | ICD-10-CM

## 2025-02-13 DIAGNOSIS — R53.83 FATIGUE, UNSPECIFIED TYPE: ICD-10-CM

## 2025-02-13 DIAGNOSIS — F33.41 RECURRENT MAJOR DEPRESSIVE DISORDER, IN PARTIAL REMISSION (HCC): Chronic | ICD-10-CM

## 2025-02-13 DIAGNOSIS — G25.81 RLS (RESTLESS LEGS SYNDROME): ICD-10-CM

## 2025-02-13 DIAGNOSIS — Z00.00 MEDICARE ANNUAL WELLNESS VISIT, SUBSEQUENT: Primary | ICD-10-CM

## 2025-02-13 DIAGNOSIS — J01.90 ACUTE BACTERIAL SINUSITIS: ICD-10-CM

## 2025-02-13 DIAGNOSIS — Z59.6 LOW INCOME: ICD-10-CM

## 2025-02-13 RX ORDER — AMOXICILLIN 500 MG/1
500 CAPSULE ORAL 2 TIMES DAILY
Qty: 20 CAPSULE | Refills: 0 | Status: SHIPPED | OUTPATIENT
Start: 2025-02-13 | End: 2025-02-23

## 2025-02-13 RX ORDER — HYDROXYZINE PAMOATE 25 MG/1
25 CAPSULE ORAL 3 TIMES DAILY PRN
Qty: 90 CAPSULE | Refills: 2 | Status: SHIPPED | OUTPATIENT
Start: 2025-02-13

## 2025-02-13 RX ORDER — OXYCODONE AND ACETAMINOPHEN 10; 325 MG/1; MG/1
1 TABLET ORAL EVERY 8 HOURS PRN
Qty: 82 TABLET | Refills: 0 | Status: SHIPPED | OUTPATIENT
Start: 2025-02-13 | End: 2025-03-15

## 2025-02-13 SDOH — SOCIAL STABILITY - SOCIAL INSECURITY: DEPENDENT RELATIVE NEEDING CARE AT HOME: Z63.6

## 2025-02-13 SDOH — ECONOMIC STABILITY - INCOME SECURITY: LOW INCOME: Z59.6

## 2025-02-13 ASSESSMENT — PATIENT HEALTH QUESTIONNAIRE - PHQ9
SUM OF ALL RESPONSES TO PHQ QUESTIONS 1-9: 2
SUM OF ALL RESPONSES TO PHQ QUESTIONS 1-9: 2
4. FEELING TIRED OR HAVING LITTLE ENERGY: NOT AT ALL
6. FEELING BAD ABOUT YOURSELF - OR THAT YOU ARE A FAILURE OR HAVE LET YOURSELF OR YOUR FAMILY DOWN: NOT AT ALL
SUM OF ALL RESPONSES TO PHQ QUESTIONS 1-9: 2
3. TROUBLE FALLING OR STAYING ASLEEP: NOT AT ALL
SUM OF ALL RESPONSES TO PHQ9 QUESTIONS 1 & 2: 2
2. FEELING DOWN, DEPRESSED OR HOPELESS: SEVERAL DAYS
1. LITTLE INTEREST OR PLEASURE IN DOING THINGS: SEVERAL DAYS
SUM OF ALL RESPONSES TO PHQ QUESTIONS 1-9: 2
5. POOR APPETITE OR OVEREATING: NOT AT ALL
10. IF YOU CHECKED OFF ANY PROBLEMS, HOW DIFFICULT HAVE THESE PROBLEMS MADE IT FOR YOU TO DO YOUR WORK, TAKE CARE OF THINGS AT HOME, OR GET ALONG WITH OTHER PEOPLE: NOT DIFFICULT AT ALL
8. MOVING OR SPEAKING SO SLOWLY THAT OTHER PEOPLE COULD HAVE NOTICED. OR THE OPPOSITE, BEING SO FIGETY OR RESTLESS THAT YOU HAVE BEEN MOVING AROUND A LOT MORE THAN USUAL: NOT AT ALL
9. THOUGHTS THAT YOU WOULD BE BETTER OFF DEAD, OR OF HURTING YOURSELF: NOT AT ALL
7. TROUBLE CONCENTRATING ON THINGS, SUCH AS READING THE NEWSPAPER OR WATCHING TELEVISION: NOT AT ALL

## 2025-02-13 ASSESSMENT — ENCOUNTER SYMPTOMS
SHORTNESS OF BREATH: 0
SORE THROAT: 1
SINUS PRESSURE: 1
COUGH: 0

## 2025-02-13 ASSESSMENT — VISUAL ACUITY
OS_CC: 20/30-1
OD_CC: 20/40

## 2025-02-13 NOTE — PATIENT INSTRUCTIONS
part of your treatment and safety. Be sure to make and go to all appointments, and call your doctor if you are having problems. It's also a good idea to know your test results and keep a list of the medicines you take.  How can you care for yourself at home?  Diet    Use less salt when you cook and eat. This helps lower your blood pressure. Taste food before salting. Add only a little salt when you think you need it. With time, your taste buds will adjust to less salt.     Eat fewer snack items, fast foods, canned soups, and other high-salt, high-fat, processed foods.     Read food labels and try to avoid saturated and trans fats. They increase your risk of heart disease by raising cholesterol levels.     Limit the amount of solid fat--butter, margarine, and shortening--you eat. Use olive, peanut, or canola oil when you cook. Bake, broil, and steam foods instead of frying them.     Eat a variety of fruit and vegetables every day. Dark green, deep orange, red, or yellow fruits and vegetables are especially good for you. Examples include spinach, carrots, peaches, and berries.     Foods high in fiber can reduce your cholesterol and provide important vitamins and minerals. High-fiber foods include whole-grain cereals and breads, oatmeal, beans, brown rice, citrus fruits, and apples.     Eat lean proteins. Heart-healthy proteins include seafood, lean meats and poultry, eggs, beans, peas, nuts, seeds, and soy products.     Limit drinks and foods with added sugar. These include candy, desserts, and soda pop.   Heart-healthy lifestyle    If your doctor recommends it, get more exercise. For many people, walking is a good choice. Or you may want to swim, bike, or do other activities. Bit by bit, increase the time you're active every day. Try for at least 30 minutes on most days of the week.     Try to quit or cut back on using tobacco and other nicotine products. This includes smoking and vaping. If you need help quitting,

## 2025-02-13 NOTE — PROGRESS NOTES
1.575 m (5' 2\")      Body mass index is 41.63 kg/m².                    Allergies   Allergen Reactions    Latex     Prednisone Hives     Prior to Visit Medications    Medication Sig Taking? Authorizing Provider   oxyCODONE-acetaminophen (ENDOCET)  MG per tablet Take 1 tablet by mouth every 8 hours as needed for Pain for up to 30 days. Should last until 3/15/25 Max Daily Amount: 3 tablets Yes Jacob Og MD   hydrOXYzine pamoate (VISTARIL) 25 MG capsule Take 1 capsule by mouth 3 times daily as needed for Itching Yes Jacob Og MD   amoxicillin (AMOXIL) 500 MG capsule Take 1 capsule by mouth 2 times daily for 10 days Yes Jacob Og MD   aspirin 81 MG EC tablet Take 1 tablet by mouth daily Yes Jacob Og MD   triamterene-hydroCHLOROthiazide (MAXZIDE-25) 37.5-25 MG per tablet Take 1 tablet by mouth daily as needed (leg swelling) Yes Benson Fatima MD   DULoxetine (CYMBALTA) 30 MG extended release capsule Take 1 capsule by mouth daily And stop sertraline while trying this duloxetine. Yes Jacob Og MD   fluticasone (FLONASE) 50 MCG/ACT nasal spray 2 sprays by Each Nostril route daily Yes Jacob Og MD   pantoprazole (PROTONIX) 40 MG tablet Take 1 tablet by mouth every morning (before breakfast) Yes Jacob Og MD   hydrOXYzine HCl (ATARAX) 25 MG tablet Take 1 tablet by mouth every 8 hours as needed for Anxiety Yes Jacob Og MD   tiZANidine (ZANAFLEX) 2 MG tablet Take 1 tablet by mouth 3 times daily as needed (Back pain) Yes Jacob Og MD   ferrous sulfate (IRON 325) 325 (65 Fe) MG tablet Take 1 tablet by mouth daily (with breakfast) Yes Jacob Og MD   rOPINIRole (REQUIP) 5 MG tablet Take 1 tablet by mouth at bedtime Yes Jacob Og MD   sertraline (ZOLOFT) 50 MG tablet Take 1 tablet by mouth nightly Yes Jacob Og MD   fluticasone furoate-vilanterol (BREO ELLIPTA) 100-25 MCG/ACT inhaler Inhale 1 puff into the lungs

## 2025-02-14 ENCOUNTER — CLINICAL DOCUMENTATION (OUTPATIENT)
Dept: SPIRITUAL SERVICES | Age: 80
End: 2025-02-14

## 2025-02-14 LAB
BASOPHILS # BLD: 0 K/UL (ref 0–0.2)
BASOPHILS NFR BLD: 0.8 %
DEPRECATED RDW RBC AUTO: 17.1 % (ref 12.4–15.4)
EOSINOPHIL # BLD: 0.2 K/UL (ref 0–0.6)
EOSINOPHIL NFR BLD: 4.2 %
HCT VFR BLD AUTO: 32.7 % (ref 36–48)
HGB BLD-MCNC: 10.6 G/DL (ref 12–16)
IRON SATN MFR SERPL: 16 % (ref 15–50)
IRON SERPL-MCNC: 59 UG/DL (ref 37–145)
LYMPHOCYTES # BLD: 2.2 K/UL (ref 1–5.1)
LYMPHOCYTES NFR BLD: 37.7 %
MCH RBC QN AUTO: 26 PG (ref 26–34)
MCHC RBC AUTO-ENTMCNC: 32.4 G/DL (ref 31–36)
MCV RBC AUTO: 80.3 FL (ref 80–100)
MONOCYTES # BLD: 0.5 K/UL (ref 0–1.3)
MONOCYTES NFR BLD: 9.3 %
NEUTROPHILS # BLD: 2.8 K/UL (ref 1.7–7.7)
NEUTROPHILS NFR BLD: 48 %
PLATELET # BLD AUTO: 333 K/UL (ref 135–450)
PMV BLD AUTO: 9 FL (ref 5–10.5)
RBC # BLD AUTO: 4.07 M/UL (ref 4–5.2)
TIBC SERPL-MCNC: 380 UG/DL (ref 260–445)
WBC # BLD AUTO: 5.8 K/UL (ref 4–11)

## 2025-02-14 NOTE — ACP (ADVANCE CARE PLANNING)
Patient   [] Left Voice mail [] Email / Mail    [] MyChart  [] Other (Specify) :       Outcomes:           []  Additional Outreach -  Date:     (Specify Dates & special circumstances):    Outcomes:         Thank you for this referral.

## 2025-02-19 ENCOUNTER — TELEPHONE (OUTPATIENT)
Dept: CARDIOLOGY CLINIC | Age: 80
End: 2025-02-19

## 2025-02-19 NOTE — TELEPHONE ENCOUNTER
Tried calling pt to get her scheduled with Dr. Pulido for Paroxysmal Atrial Fibrillation. No VM. If pt calls back schedule with Dr. Pulido.

## 2025-02-21 ENCOUNTER — TELEPHONE (OUTPATIENT)
Dept: CARDIOLOGY CLINIC | Age: 80
End: 2025-02-21

## 2025-02-21 ENCOUNTER — PATIENT MESSAGE (OUTPATIENT)
Dept: CARDIOLOGY CLINIC | Age: 80
End: 2025-02-21

## 2025-02-21 NOTE — TELEPHONE ENCOUNTER
UNABLE TO LEAVE A VM FOR PT; PT DOES NOT HAVE VM SET UP.   PT CAN SCHEDULE AN OV WITH DR. OLIVA (LAST OV 02/21/2024)    REFERRAL SENT BY DR. GRULLON  DX: PAROXYSMAL ATRIAL FIBRILLATION (HCC)     2ND ATTEMPT AT CONTACTING PATIENT ( 02/19, 02/21)  MY CHART MESSAGE SENT ON 02/21/2025

## 2025-02-22 NOTE — ASSESSMENT & PLAN NOTE
Continue as needed Myrbetriq for urinary incontinence Pt was diagnosed with crohn's at [facility] and prescribed prednisone until she could get her biologic next month. Pt needed refill on prednisone and was told by [facility] that she needed her primary care to fill it. [facility] and was told because she was on medicaid they wouldn't allow her to pay out of pocket for the prednisone.

## 2025-02-24 ENCOUNTER — TELEPHONE (OUTPATIENT)
Dept: CARDIOLOGY CLINIC | Age: 80
End: 2025-02-24

## 2025-02-24 NOTE — TELEPHONE ENCOUNTER
Unable to leave a Vm for pt. Vm not set up.      Patient can schedule an OV with Dr. Killian. Last OV on 02/21/2024.      Referral by Dr. Jacob Og   DX: Paroxysmal atrial fibrillation (HCC)     Third attempt at contacting patient by VM: 02/19, 02/21 and 02/24.   My chart message sent on 02/21/2025.      Referral closed: unable to contact.

## 2025-03-12 ENCOUNTER — OFFICE VISIT (OUTPATIENT)
Dept: FAMILY MEDICINE CLINIC | Age: 80
End: 2025-03-12
Payer: MEDICARE

## 2025-03-12 VITALS
BODY MASS INDEX: 41.4 KG/M2 | DIASTOLIC BLOOD PRESSURE: 76 MMHG | OXYGEN SATURATION: 95 % | WEIGHT: 226.38 LBS | HEART RATE: 68 BPM | SYSTOLIC BLOOD PRESSURE: 128 MMHG

## 2025-03-12 DIAGNOSIS — M16.0 PRIMARY OSTEOARTHRITIS OF BOTH HIPS: ICD-10-CM

## 2025-03-12 DIAGNOSIS — G89.29 CHRONIC BILATERAL LOW BACK PAIN WITH BILATERAL SCIATICA: Primary | Chronic | ICD-10-CM

## 2025-03-12 DIAGNOSIS — M79.89 LEG SWELLING: ICD-10-CM

## 2025-03-12 DIAGNOSIS — F51.04 PSYCHOPHYSIOLOGICAL INSOMNIA: ICD-10-CM

## 2025-03-12 DIAGNOSIS — F41.1 GAD (GENERALIZED ANXIETY DISORDER): ICD-10-CM

## 2025-03-12 DIAGNOSIS — M48.062 SPINAL STENOSIS OF LUMBAR REGION WITH NEUROGENIC CLAUDICATION: ICD-10-CM

## 2025-03-12 DIAGNOSIS — M54.42 CHRONIC BILATERAL LOW BACK PAIN WITH BILATERAL SCIATICA: Primary | Chronic | ICD-10-CM

## 2025-03-12 DIAGNOSIS — M54.41 CHRONIC BILATERAL LOW BACK PAIN WITH BILATERAL SCIATICA: Primary | Chronic | ICD-10-CM

## 2025-03-12 DIAGNOSIS — Z59.819 HOUSING INSECURITY: ICD-10-CM

## 2025-03-12 DIAGNOSIS — G25.81 RLS (RESTLESS LEGS SYNDROME): ICD-10-CM

## 2025-03-12 PROCEDURE — G8400 PT W/DXA NO RESULTS DOC: HCPCS | Performed by: FAMILY MEDICINE

## 2025-03-12 PROCEDURE — G8427 DOCREV CUR MEDS BY ELIG CLIN: HCPCS | Performed by: FAMILY MEDICINE

## 2025-03-12 PROCEDURE — 1123F ACP DISCUSS/DSCN MKR DOCD: CPT | Performed by: FAMILY MEDICINE

## 2025-03-12 PROCEDURE — 1159F MED LIST DOCD IN RCRD: CPT | Performed by: FAMILY MEDICINE

## 2025-03-12 PROCEDURE — 1036F TOBACCO NON-USER: CPT | Performed by: FAMILY MEDICINE

## 2025-03-12 PROCEDURE — G8417 CALC BMI ABV UP PARAM F/U: HCPCS | Performed by: FAMILY MEDICINE

## 2025-03-12 PROCEDURE — 1090F PRES/ABSN URINE INCON ASSESS: CPT | Performed by: FAMILY MEDICINE

## 2025-03-12 PROCEDURE — 99214 OFFICE O/P EST MOD 30 MIN: CPT | Performed by: FAMILY MEDICINE

## 2025-03-12 RX ORDER — TRAZODONE HYDROCHLORIDE 100 MG/1
100 TABLET ORAL NIGHTLY
Qty: 30 TABLET | Refills: 3 | Status: SHIPPED | OUTPATIENT
Start: 2025-03-12

## 2025-03-12 RX ORDER — ROPINIROLE 5 MG/1
5 TABLET, FILM COATED ORAL NIGHTLY
Qty: 90 TABLET | Refills: 1 | Status: SHIPPED | OUTPATIENT
Start: 2025-03-12

## 2025-03-12 RX ORDER — TRIAMTERENE AND HYDROCHLOROTHIAZIDE 37.5; 25 MG/1; MG/1
1 TABLET ORAL DAILY PRN
Qty: 90 TABLET | Refills: 0 | Status: SHIPPED | OUTPATIENT
Start: 2025-03-12

## 2025-03-12 RX ORDER — OXYCODONE AND ACETAMINOPHEN 10; 325 MG/1; MG/1
1 TABLET ORAL EVERY 8 HOURS PRN
Qty: 90 TABLET | Refills: 0 | Status: SHIPPED | OUTPATIENT
Start: 2025-03-12 | End: 2025-04-14

## 2025-03-12 RX ORDER — TIZANIDINE 2 MG/1
2 TABLET ORAL 3 TIMES DAILY PRN
Qty: 60 TABLET | Refills: 2 | Status: SHIPPED | OUTPATIENT
Start: 2025-03-12

## 2025-03-12 SDOH — ECONOMIC STABILITY - HOUSING INSECURITY: HOUSING INSTABILITY UNSPECIFIED: Z59.819

## 2025-03-12 NOTE — PROGRESS NOTES
3/12/25    Nereyda Reynaga  1945    SUBJECTIVE    HPI - Nereyda is a 80 y.o. female who presents today for evaluation of:  Chief Complaint   Patient presents with    1 Month Follow-Up    Discuss Medications     Is taking cymbalta but would like to go back to zoloft feels it works better      Other         History of Present Illness  The patient is an 80-year-old female who presents for evaluation of back pain, leg swelling, anxiety, depression, and chronic insomnia.    She has been experiencing back pain radiating down both legs, indicative of sciatica, for the past 3 months. Despite the pain, she maintains an active lifestyle, walking as much as possible. However, prolonged sitting exacerbates her discomfort. She reports a gradual improvement in her condition, although at a slow pace. She has sought the expertise of a pain management specialist and undergone physical therapy, which unfortunately did not alleviate her symptoms. She is currently on hydrocodone, which provides some relief.    She also reports swelling in her legs, a condition she is uncertain how to manage. She has no history of heart failure. She experiences shortness of breath during ambulation. She has been adhering to a low-salt diet and ensures adequate protein intake. She recalls that triamterene-hydrochlorothiazide, prescribed by Dr. Fatima in December 2024, was beneficial in reducing her leg swelling.    She expresses concern about the frequency of pain following joint replacement surgery and questions whether the surrounding muscles could be the source of the discomfort.    She has been experiencing sleep disturbances and has been sleeping in her recliner instead of her bed. She is currently on trazodone for sleep.    She reports that sertraline has been more effective than duloxetine in managing her depression. She takes her medication at night and reports that her mood is not currently stable. She is on sertraline 50 mg daily.    She is on

## 2025-03-12 NOTE — PATIENT INSTRUCTIONS
[facility] Housing Resources*  (Call [facility]/211 if need more resources.)    [facility]:   What they offer: Housing Choice voucher, low-income public housing  Phone Number: [phone]    [employer]:  What they offer: Emergency rent, mortgage assistance, security deposit assistance, clothing and food assistance as well.  Phone number: 196.372.2739 or 048-194-0526  [facility] [employer] [facility]:   What they offer: affordable housing, home repair, financial coaching, foreclosure intervention. Homebuyer education  Phone Number: [phone]     [employer] ([place])  What they offer: critical repair, Restore, housing (income based)  Phone number: (182) 737-7652    [facility]:  What they offer: [facility] - adaptive home modifications for 60+   Phone number: (689) 823-8528    [facility]:   What they offer: elderly home repair, insulation, and weatherization, rent assistance, HEAP / PIPP / water; emergency shelter ([place] and [place])   [facility] served: [facility]  Phone Number: [place] [phone]        [employer]: ([place])  What they offer: Emergency shelter for men women and children or victims of domestic violence.  Phone Numbers: 583.281.9000 or [phone]   [employer]:   What they offer: Homeless Shelter. Intake hours are Monday - Friday, 8:00am - 4:00pm.  Families with children have priority, followed by two person households, and then singles.   Phone Number: [phone]  [employer]   What they offer: emergency shelter for victims of domestic violence and / or sexual assault and their children, 24-hour crisis line, individual and group counseling, case management, victim advocacy, community education and prevention.  Phone number: 24/7: [phone]

## 2025-04-02 DIAGNOSIS — R15.9 FULL INCONTINENCE OF FECES: ICD-10-CM

## 2025-04-02 RX ORDER — LOPERAMIDE HYDROCHLORIDE 2 MG/1
2 CAPSULE ORAL 2 TIMES DAILY PRN
Qty: 180 CAPSULE | Refills: 0 | Status: SHIPPED | OUTPATIENT
Start: 2025-04-02

## 2025-04-11 ENCOUNTER — OFFICE VISIT (OUTPATIENT)
Dept: FAMILY MEDICINE CLINIC | Age: 80
End: 2025-04-11
Payer: MEDICARE

## 2025-04-11 VITALS
DIASTOLIC BLOOD PRESSURE: 82 MMHG | SYSTOLIC BLOOD PRESSURE: 132 MMHG | WEIGHT: 226.2 LBS | BODY MASS INDEX: 41.37 KG/M2 | HEART RATE: 52 BPM | OXYGEN SATURATION: 96 %

## 2025-04-11 DIAGNOSIS — G25.81 RLS (RESTLESS LEGS SYNDROME): ICD-10-CM

## 2025-04-11 DIAGNOSIS — M54.42 CHRONIC BILATERAL LOW BACK PAIN WITH BILATERAL SCIATICA: Primary | Chronic | ICD-10-CM

## 2025-04-11 DIAGNOSIS — G89.29 CHRONIC BILATERAL LOW BACK PAIN WITH BILATERAL SCIATICA: Primary | Chronic | ICD-10-CM

## 2025-04-11 DIAGNOSIS — M54.41 CHRONIC BILATERAL LOW BACK PAIN WITH BILATERAL SCIATICA: Primary | Chronic | ICD-10-CM

## 2025-04-11 DIAGNOSIS — D50.9 IRON DEFICIENCY ANEMIA, UNSPECIFIED IRON DEFICIENCY ANEMIA TYPE: ICD-10-CM

## 2025-04-11 DIAGNOSIS — M48.062 SPINAL STENOSIS OF LUMBAR REGION WITH NEUROGENIC CLAUDICATION: ICD-10-CM

## 2025-04-11 DIAGNOSIS — M16.0 PRIMARY OSTEOARTHRITIS OF BOTH HIPS: ICD-10-CM

## 2025-04-11 PROBLEM — E61.1 IRON DEFICIENCY: Status: RESOLVED | Noted: 2024-10-02 | Resolved: 2025-04-11

## 2025-04-11 PROCEDURE — G8400 PT W/DXA NO RESULTS DOC: HCPCS | Performed by: FAMILY MEDICINE

## 2025-04-11 PROCEDURE — 1090F PRES/ABSN URINE INCON ASSESS: CPT | Performed by: FAMILY MEDICINE

## 2025-04-11 PROCEDURE — 1036F TOBACCO NON-USER: CPT | Performed by: FAMILY MEDICINE

## 2025-04-11 PROCEDURE — G8427 DOCREV CUR MEDS BY ELIG CLIN: HCPCS | Performed by: FAMILY MEDICINE

## 2025-04-11 PROCEDURE — 1159F MED LIST DOCD IN RCRD: CPT | Performed by: FAMILY MEDICINE

## 2025-04-11 PROCEDURE — 1123F ACP DISCUSS/DSCN MKR DOCD: CPT | Performed by: FAMILY MEDICINE

## 2025-04-11 PROCEDURE — 99213 OFFICE O/P EST LOW 20 MIN: CPT | Performed by: FAMILY MEDICINE

## 2025-04-11 PROCEDURE — G8417 CALC BMI ABV UP PARAM F/U: HCPCS | Performed by: FAMILY MEDICINE

## 2025-04-11 RX ORDER — FERROUS SULFATE 325(65) MG
325 TABLET ORAL
Qty: 30 TABLET | Refills: 5 | Status: SHIPPED | OUTPATIENT
Start: 2025-04-13

## 2025-04-11 RX ORDER — OXYCODONE AND ACETAMINOPHEN 10; 325 MG/1; MG/1
1 TABLET ORAL EVERY 8 HOURS PRN
Qty: 90 TABLET | Refills: 0 | Status: SHIPPED | OUTPATIENT
Start: 2025-04-11 | End: 2025-05-14

## 2025-04-11 NOTE — PROGRESS NOTES
4/11/25    Nereyda Reynaga  1945    SUBJECTIVE    HPI - Nereyda is a 80 y.o. female who presents today for evaluation of:  Chief Complaint   Patient presents with    1 Month Follow-Up     Chronic pain           History of Present Illness  The patient presents for evaluation of back pain.    She reports persistent back pain, which she describes as being of similar intensity to previous episodes. Additionally, she experiences intermittent leg pain, which she attributes to her sciatic nerve. This discomfort has been so severe that it has disrupted her sleep for the past 3 days. She is currently on a regimen of Lyrica and iron supplements, although she exhausted her supply of the latter the previous night. She also takes oxycodone, which she finds effective in managing her pain. However, she notes that her last prescription was for 82 tablets, not the 90 she had anticipated. She reports difficulty in rising from her recliner, describing the sensation as akin to stiffness. She is considering seeking chiropractic care for this issue.    The pressure ulcer on her right buttock has completely resolved. She is no longer going to the wound care place.     MEDICATIONS  Lyrica, iron pills, oxycodone      Review of Systems    Allergies   Allergen Reactions    Latex     Prednisone Hives        OBJECTIVE    /82   Pulse 52   Wt 102.6 kg (226 lb 3.2 oz)   SpO2 96%   BMI 41.37 kg/m²     Physical Exam   Constitutional:       General: Not in acute distress.     Appearance: Normal appearance. Not ill-appearing.   Eyes:      General: No scleral icterus.  Cardiovascular:      Rate and Rhythm: Normal rate and regular rhythm.      Heart sounds: No murmur heard.   No friction rub. No gallop.    Pulmonary:      Effort: Pulmonary effort is normal. No respiratory distress.      Breath sounds: No wheezing, rhonchi or rales.   Abdominal:      Palpations: Abdomen is soft. There is no mass.      Tenderness: There is no abdominal

## 2025-04-15 PROBLEM — F51.04 PSYCHOPHYSIOLOGICAL INSOMNIA: Status: RESOLVED | Noted: 2022-07-20 | Resolved: 2025-04-15

## 2025-05-09 ENCOUNTER — PATIENT MESSAGE (OUTPATIENT)
Dept: FAMILY MEDICINE CLINIC | Age: 80
End: 2025-05-09

## 2025-05-09 ENCOUNTER — OFFICE VISIT (OUTPATIENT)
Dept: FAMILY MEDICINE CLINIC | Age: 80
End: 2025-05-09
Payer: MEDICARE

## 2025-05-09 ENCOUNTER — TELEPHONE (OUTPATIENT)
Dept: FAMILY MEDICINE CLINIC | Age: 80
End: 2025-05-09

## 2025-05-09 VITALS
HEART RATE: 49 BPM | SYSTOLIC BLOOD PRESSURE: 138 MMHG | TEMPERATURE: 96.6 F | DIASTOLIC BLOOD PRESSURE: 88 MMHG | BODY MASS INDEX: 41.37 KG/M2 | WEIGHT: 226.2 LBS | OXYGEN SATURATION: 98 %

## 2025-05-09 DIAGNOSIS — R00.1 BRADYCARDIA: ICD-10-CM

## 2025-05-09 DIAGNOSIS — M54.41 CHRONIC BILATERAL LOW BACK PAIN WITH BILATERAL SCIATICA: Primary | Chronic | ICD-10-CM

## 2025-05-09 DIAGNOSIS — R06.7 SNEEZING: ICD-10-CM

## 2025-05-09 DIAGNOSIS — J30.9 ALLERGIC RHINITIS, UNSPECIFIED SEASONALITY, UNSPECIFIED TRIGGER: ICD-10-CM

## 2025-05-09 DIAGNOSIS — M16.0 PRIMARY OSTEOARTHRITIS OF BOTH HIPS: ICD-10-CM

## 2025-05-09 DIAGNOSIS — E66.813 CLASS 3 SEVERE OBESITY DUE TO EXCESS CALORIES WITH SERIOUS COMORBIDITY AND BODY MASS INDEX (BMI) OF 40.0 TO 44.9 IN ADULT (HCC): ICD-10-CM

## 2025-05-09 DIAGNOSIS — G89.29 CHRONIC BILATERAL LOW BACK PAIN WITH BILATERAL SCIATICA: Primary | Chronic | ICD-10-CM

## 2025-05-09 DIAGNOSIS — R42 DIZZY SPELLS: ICD-10-CM

## 2025-05-09 DIAGNOSIS — M48.062 SPINAL STENOSIS OF LUMBAR REGION WITH NEUROGENIC CLAUDICATION: ICD-10-CM

## 2025-05-09 DIAGNOSIS — M79.89 LEG SWELLING: ICD-10-CM

## 2025-05-09 DIAGNOSIS — M54.42 CHRONIC BILATERAL LOW BACK PAIN WITH BILATERAL SCIATICA: Primary | Chronic | ICD-10-CM

## 2025-05-09 DIAGNOSIS — K90.49 DAIRY PRODUCT INTOLERANCE: ICD-10-CM

## 2025-05-09 PROBLEM — L89.312 PRESSURE INJURY OF RIGHT BUTTOCK, STAGE 2 (HCC): Status: RESOLVED | Noted: 2024-10-02 | Resolved: 2025-05-09

## 2025-05-09 PROCEDURE — G8417 CALC BMI ABV UP PARAM F/U: HCPCS | Performed by: FAMILY MEDICINE

## 2025-05-09 PROCEDURE — 1090F PRES/ABSN URINE INCON ASSESS: CPT | Performed by: FAMILY MEDICINE

## 2025-05-09 PROCEDURE — G8427 DOCREV CUR MEDS BY ELIG CLIN: HCPCS | Performed by: FAMILY MEDICINE

## 2025-05-09 PROCEDURE — 1036F TOBACCO NON-USER: CPT | Performed by: FAMILY MEDICINE

## 2025-05-09 PROCEDURE — 1159F MED LIST DOCD IN RCRD: CPT | Performed by: FAMILY MEDICINE

## 2025-05-09 PROCEDURE — 99214 OFFICE O/P EST MOD 30 MIN: CPT | Performed by: FAMILY MEDICINE

## 2025-05-09 PROCEDURE — 1123F ACP DISCUSS/DSCN MKR DOCD: CPT | Performed by: FAMILY MEDICINE

## 2025-05-09 PROCEDURE — G8400 PT W/DXA NO RESULTS DOC: HCPCS | Performed by: FAMILY MEDICINE

## 2025-05-09 RX ORDER — OXYCODONE AND ACETAMINOPHEN 10; 325 MG/1; MG/1
1 TABLET ORAL EVERY 8 HOURS PRN
Qty: 90 TABLET | Refills: 0 | Status: SHIPPED | OUTPATIENT
Start: 2025-05-13 | End: 2025-06-12

## 2025-05-09 RX ORDER — OXYCODONE AND ACETAMINOPHEN 10; 325 MG/1; MG/1
1 TABLET ORAL EVERY 8 HOURS PRN
Qty: 90 TABLET | Refills: 0 | Status: SHIPPED | OUTPATIENT
Start: 2025-05-09 | End: 2025-05-09

## 2025-05-09 RX ORDER — LORATADINE 10 MG/1
10 TABLET ORAL DAILY
Qty: 30 TABLET | Refills: 0 | Status: SHIPPED | OUTPATIENT
Start: 2025-05-09 | End: 2025-06-08

## 2025-05-09 RX ORDER — FLUTICASONE PROPIONATE 50 MCG
2 SPRAY, SUSPENSION (ML) NASAL DAILY
Qty: 16 G | Refills: 3 | Status: SHIPPED | OUTPATIENT
Start: 2025-05-09

## 2025-05-09 NOTE — TELEPHONE ENCOUNTER
[facility] pharmacy called stating the only way insurance will cover the Endocet is if it says 90 for 30 days. The instructions currently state Take 1 tablet by mouth every 8 hours as needed for Pain for up to 35 days. Should last until 6/13/25 Max Daily Amount: 3 tablets. They are inactivating the current prescription.

## 2025-05-09 NOTE — PROGRESS NOTES
MG per tablet; Take 1 tablet by mouth every 8 hours as needed for Pain for up to 30 days. Should last until 6/13/25 Max Daily Amount: 3 tablets, Disp-90 tablet, R-0Normal  8. Allergic rhinitis, unspecified seasonality, unspecified trigger  -     fluticasone (FLONASE) 50 MCG/ACT nasal spray; 2 sprays by Each Nostril route daily, Disp-16 g, R-3Normal  9. Dairy product intolerance  -     loratadine (CLARITIN) 10 MG tablet; Take 1 tablet by mouth daily, Disp-30 tablet, R-0Normal  10. Class 3 severe obesity due to excess calories with serious comorbidity and body mass index (BMI) of 40.0 to 44.9 in adult ([facility])       Assessment & Plan  1/6/7. Chronic hip and back pain.  - Her hip pain is well-managed with her current medication regimen.  - Prescription for oxycodone will be refilled to manage her hip pain.  - Prescription Drug Monitoring Program was reviewed.  - Follow-up appointment scheduled for 06/13/2025.    2. Sneezing.  - Reports sneezing after consuming dairy products.  - Prescription for loratadine will be provided.  - Advised to continue using Flonase nasal spray but to discontinue it for a week if she experiences nosebleeds.  - Declined referral to an ear, nose, and throat specialist.    3/4. Dizziness.  - Dizziness may be attributed to a slow heart rate.  - Heart rate is slower than usual upon examination.  - Referral to a cardiologist will be made for further evaluation.  - Orders for a blood pressure cuff and pulse oximeter have been placed; advised to consult with her care coordinator regarding insurance coverage and the appropriate location for order placement.    5. Leg swelling.  - Currently on triamterene-hydrochlorothiazide, taken as needed for leg swelling.  - Advised to avoid daily use of the medication for leg swelling.  - Consider alternative methods such as compression stockings and elevating her legs.  - Discussed the potential impact of medication on blood pressure and dizziness.    6 and

## 2025-05-12 ENCOUNTER — TELEPHONE (OUTPATIENT)
Dept: CARDIOLOGY CLINIC | Age: 80
End: 2025-05-12

## 2025-05-12 ENCOUNTER — PATIENT MESSAGE (OUTPATIENT)
Dept: CARDIOLOGY CLINIC | Age: 80
End: 2025-05-12

## 2025-05-12 NOTE — TELEPHONE ENCOUNTER
Left a Vm for pt to call back and schedule an appointment with Dr. Killian. Last OV was on 02/21/2024.     Referral from Dr. Jacob Og   DX: Dizzy spells and Bradycardia.     1st attempt at contacting patient.   My Chart message sent as well.

## 2025-05-21 ENCOUNTER — HOSPITAL ENCOUNTER (OUTPATIENT)
Dept: WOUND CARE | Age: 80
Discharge: HOME | End: 2025-05-21
Attending: NURSE PRACTITIONER
Payer: MEDICARE

## 2025-05-21 VITALS
HEART RATE: 56 BPM | DIASTOLIC BLOOD PRESSURE: 94 MMHG | SYSTOLIC BLOOD PRESSURE: 144 MMHG | TEMPERATURE: 98 F | RESPIRATION RATE: 20 BRPM

## 2025-05-21 DIAGNOSIS — R15.9 FULL INCONTINENCE OF FECES: ICD-10-CM

## 2025-05-21 DIAGNOSIS — E66.813 CLASS 3 SEVERE OBESITY DUE TO EXCESS CALORIES WITH SERIOUS COMORBIDITY AND BODY MASS INDEX (BMI) OF 40.0 TO 44.9 IN ADULT (HCC): ICD-10-CM

## 2025-05-21 DIAGNOSIS — L89.313 DECUBITUS ULCER OF RIGHT BUTTOCK, STAGE 3 (HCC): Primary | ICD-10-CM

## 2025-05-21 PROCEDURE — 11042 DBRDMT SUBQ TIS 1ST 20SQCM/<: CPT

## 2025-05-21 PROCEDURE — 11042 DBRDMT SUBQ TIS 1ST 20SQCM/<: CPT | Performed by: NURSE PRACTITIONER

## 2025-05-21 PROCEDURE — 99213 OFFICE O/P EST LOW 20 MIN: CPT | Performed by: NURSE PRACTITIONER

## 2025-05-21 ASSESSMENT — PAIN DESCRIPTION - DESCRIPTORS: DESCRIPTORS: SHARP

## 2025-05-21 ASSESSMENT — PAIN SCALES - GENERAL: PAINLEVEL_OUTOF10: 5

## 2025-05-21 ASSESSMENT — PAIN DESCRIPTION - ORIENTATION: ORIENTATION: RIGHT

## 2025-05-21 ASSESSMENT — PAIN DESCRIPTION - FREQUENCY: FREQUENCY: CONTINUOUS

## 2025-05-21 ASSESSMENT — PAIN DESCRIPTION - PAIN TYPE: TYPE: ACUTE PAIN

## 2025-05-21 ASSESSMENT — PAIN DESCRIPTION - LOCATION: LOCATION: BUTTOCKS

## 2025-05-21 ASSESSMENT — PAIN - FUNCTIONAL ASSESSMENT: PAIN_FUNCTIONAL_ASSESSMENT: PREVENTS OR INTERFERES SOME ACTIVE ACTIVITIES AND ADLS

## 2025-05-21 NOTE — PLAN OF CARE
Wound Care Supplies      Supply Company:     [id number]  [address], [place] [address] p: [phone] f: [phone]      Ordering Center:     [facility]  [address]  [facility] [address] [phone]  WOUND CARE Dept: [phone]   FAX NUMBER [phone]    Patient Information:      Nereyda Reynaga  [address]  [facility] [address]   [phone]   : 1945  AGE: 80 y.o.     GENDER: female   EPISODE DATE: 2025    Insurance:      PRIMARY INSURANCE:  Plan: [email or web address] DUAL COMPLETE  Coverage: [facility] MEDICARE  Effective Date: 2025  Group Number: [unfilled]  Subscriber Number: [id number] - (Medicare Managed)    Payer/Plan Subscr  Sex Relation Sub. Ins. ID Effective Group Num   1. [facility] MEDICARE * NEREYDA REYNAGA 1945 Female Self [id number]                                    [address]   2. [place] [employer]* NEREYDA REYNAGA 1945 Female Self [id number]                                   [address]       Patient Wound Information:      Problem List Items Addressed This Visit    None      WOUNDS REQUIRING DRESSING SUPPLIES:     Wound 25 Buttocks Right #1 (Active)   Wound Image   25 1318   Wound Cleansed Wound cleanser 25 1318   Wound Length (cm) 1.6 cm 25 1318   Wound Width (cm) 0.7 cm 25 1318   Wound Depth (cm) 0.2 cm 25 1318   Wound Surface Area (cm^2) 1.12 cm^2 25 1318   Wound Volume (cm^3) 0.224 cm^3 25 1318   Post-Procedure Length (cm) 1.6 cm 25 1323   Post-Procedure Width (cm) 0.7 cm 25 1323   Post-Procedure Depth (cm) 0.2 cm 25 1323   Post-Procedure Surface Area (cm^2) 1.12 cm^2 25 1323   Post-Procedure Volume (cm^3) 0.224 cm^3 25 1323   Distance Tunneling (cm) 0 cm 25 1318   Tunneling Position ___ O'Clock 0 25 1318   Undermining Starts ___ O'Clock 0 25 1318   Undermining Ends___ O'Clock 0 25 1318   Undermining Maxium

## 2025-05-21 NOTE — PATIENT INSTRUCTIONS
PHYSICIAN ORDERS AND DISCHARGE INSTRUCTIONS     Wound Care Notes:           Orders for this week:  2025       Right Buttock:Wash with soap and water. Pat dry.   Apply puracol ag to wound bed   Cover with sacral foam border   Change: Every other day       Dispense 30 day quantity when ordering supplies.  Plan:  Ordered supplies 25      Follow Up Instructions: 1 week   Primary Wound Care Provider: Bryanna Ramos CNP  Call  for any questions or concerns.  Central [phone] for imaging and lab work

## 2025-05-21 NOTE — PROGRESS NOTES
Wound Care Center Progress Visit      Nereyda Reynaga  AGE: 80 y.o.   GENDER: female  : 1945  EPISODE DATE:  2025   Referred by: Jacob Og MD     Subjective:     CHIEF COMPLAINT  WOUND   Problem List Items Addressed This Visit          Other    incontinence of feces, Full    Class 3 severe obesity due to excess calories with serious comorbidity and body mass index (BMI) of 40.0 to 44.9 in adult (HCC)    Decubitus ulcer of right buttock, stage 3 (HCC) - Primary     Chief Complaint   Patient presents with    Wound Check        HISTORY of PRESENT ILLNESS      Nereyda Reynaga is a 80 y.o. female who presents to the Wound Clinic for evaluation and treatment of Chronic pressure  ulcer(s) of  Right buttock.  The condition is of moderate severity. The ulcer has been present for approximately 3 months at initial visit to the wound clinic.  The underlying cause is thought to be pressure.  The patients care to date has included Desitin cream.  Advanced wound care modalities established with initiation of care at the wound clinic.The patient has significant underlying medical conditions as below. PCP is, Jacob Og MD .    Living Situation  [x] Home [] SNF []  Assisted living  [] Other (ie rehab, etc.) [] Home Health  []  Transportation    Wound Pain Timing/Severity: intermittent  Quality of pain: aching  Severity of pain:  3 / 10   Modifying Factors: edema, chronic pressure, obesity, and spinal stenosis, RLS, Urge incontinence  Associated Signs/Symptoms: drainage and pain    Wound status:  2025       Regimen discussed and established with patient/family as below. The patients records were reviewed and discussed.  Time was given for questions. All questions were answered to the patients satisfaction. Face-to-face time was spent with the patient providing counseling and coordination of care.     [] Stable [] Worse [] Reopened [] Improved [x] Initial visit []  Revisit []  Healed    [x] Adjustments made

## 2025-05-28 ENCOUNTER — HOSPITAL ENCOUNTER (OUTPATIENT)
Dept: WOUND CARE | Age: 80
Discharge: HOME | End: 2025-05-28
Attending: NURSE PRACTITIONER

## 2025-05-28 NOTE — PATIENT INSTRUCTIONS
PHYSICIAN ORDERS AND DISCHARGE INSTRUCTIONS     Wound Care Notes:           Orders for this week:  2025       Right Buttock:Wash with soap and water. Pat dry.   Apply puracol ag to wound bed   Cover with sacral foam border   Change: Every other day       Dispense 30 day quantity when ordering supplies.  Plan:  Ordered supplies 25      Follow Up Instructions: 1 week   Primary Wound Care Provider: Bryanna Ramos CNP  Call  for any questions or concerns.  Central [phone] for imaging and lab work

## 2025-06-04 ENCOUNTER — HOSPITAL ENCOUNTER (OUTPATIENT)
Dept: WOUND CARE | Age: 80
Discharge: HOME | End: 2025-06-04
Attending: NURSE PRACTITIONER
Payer: MEDICARE

## 2025-06-04 VITALS
DIASTOLIC BLOOD PRESSURE: 67 MMHG | HEART RATE: 61 BPM | SYSTOLIC BLOOD PRESSURE: 142 MMHG | TEMPERATURE: 97.3 F | RESPIRATION RATE: 18 BRPM

## 2025-06-04 DIAGNOSIS — L89.313 DECUBITUS ULCER OF RIGHT BUTTOCK, STAGE 3 (HCC): Primary | ICD-10-CM

## 2025-06-04 PROCEDURE — 11042 DBRDMT SUBQ TIS 1ST 20SQCM/<: CPT

## 2025-06-04 PROCEDURE — 11042 DBRDMT SUBQ TIS 1ST 20SQCM/<: CPT | Performed by: NURSE PRACTITIONER

## 2025-06-04 RX ORDER — LIDOCAINE HYDROCHLORIDE 20 MG/ML
JELLY TOPICAL PRN
OUTPATIENT
Start: 2025-06-04

## 2025-06-04 RX ORDER — MUPIROCIN 20 MG/G
OINTMENT TOPICAL PRN
OUTPATIENT
Start: 2025-06-04

## 2025-06-04 RX ORDER — NEOMYCIN/BACITRACIN/POLYMYXINB 3.5-400-5K
OINTMENT (GRAM) TOPICAL PRN
OUTPATIENT
Start: 2025-06-04

## 2025-06-04 RX ORDER — SODIUM CHLOR/HYPOCHLOROUS ACID 0.033 %
SOLUTION, IRRIGATION IRRIGATION PRN
OUTPATIENT
Start: 2025-06-04

## 2025-06-04 RX ORDER — GENTAMICIN SULFATE 1 MG/G
OINTMENT TOPICAL PRN
OUTPATIENT
Start: 2025-06-04

## 2025-06-04 RX ORDER — LIDOCAINE 40 MG/G
CREAM TOPICAL PRN
OUTPATIENT
Start: 2025-06-04

## 2025-06-04 RX ORDER — GINSENG 100 MG
CAPSULE ORAL PRN
OUTPATIENT
Start: 2025-06-04

## 2025-06-04 RX ORDER — BACITRACIN ZINC AND POLYMYXIN B SULFATE 500; 1000 [USP'U]/G; [USP'U]/G
OINTMENT TOPICAL PRN
OUTPATIENT
Start: 2025-06-04

## 2025-06-04 RX ORDER — LIDOCAINE 50 MG/G
OINTMENT TOPICAL PRN
OUTPATIENT
Start: 2025-06-04

## 2025-06-04 RX ORDER — LIDOCAINE HYDROCHLORIDE 40 MG/ML
SOLUTION TOPICAL PRN
OUTPATIENT
Start: 2025-06-04

## 2025-06-04 RX ORDER — SILVER SULFADIAZINE 10 MG/G
CREAM TOPICAL PRN
OUTPATIENT
Start: 2025-06-04

## 2025-06-04 ASSESSMENT — PAIN SCALES - GENERAL: PAINLEVEL_OUTOF10: 0

## 2025-06-04 NOTE — PATIENT INSTRUCTIONS
PHYSICIAN ORDERS AND DISCHARGE INSTRUCTIONS     Wound Care Notes:           Orders for this week:  2025       Right Buttock:Wash with soap and water. Pat dry.   Apply puracol ag to wound bed   Cover with sacral foam border   Change: Every other day       Dispense 30 day quantity when ordering supplies.  Plan:  Ordered supplies 25      Follow Up Instructions: 1 week   Primary Wound Care Provider: Suzi Garcia CNP  Call  for any questions or concerns.  Central [phone] for imaging and lab work

## 2025-06-04 NOTE — PROGRESS NOTES
Wound Care Center Progress Visit      Nereyda Reynaga  AGE: 80 y.o.   GENDER: female  : 1945  EPISODE DATE:  2025   Referred by: Jacob Og MD     Subjective:     CHIEF COMPLAINT  WOUND   Problem List Items Addressed This Visit          Other    Decubitus ulcer of right buttock, stage 3 (HCC) - Primary     Chief Complaint   Patient presents with    Wound Check        HISTORY of PRESENT ILLNESS      Nereyda Reynaga is a 80 y.o. female who presents to the Wound Clinic for evaluation and treatment of Chronic pressure  ulcer(s) of  Right buttock.  The condition is of moderate severity. The ulcer has been present for approximately 3 months at initial visit to the wound clinic.  The underlying cause is thought to be pressure.  The patients care to date has included Desitin cream.  Advanced wound care modalities established with initiation of care at the wound clinic.The patient has significant underlying medical conditions as below. PCP is, Jacob Og MD .    Living Situation  [x] Home [] SNF []  Assisted living  [] Other (ie rehab, etc.) [] Home Health  []  Transportation    Wound Pain Timing/Severity: intermittent  Quality of pain: aching  Severity of pain:  3 / 10   Modifying Factors: edema, chronic pressure, obesity, and spinal stenosis, RLS, Urge incontinence  Associated Signs/Symptoms: drainage and pain    Wound status:  2025       Patient presents for follow up and management of right buttock. Site shows much improvement. Patient without concerns. States doing well with dressing changes. Bedside debridement completed to facilitate healing, patient tolerated well.     Regimen discussed and established with patient/family as below. The patients records were reviewed and discussed.  Time was given for questions. All questions were answered to the patients satisfaction. Face-to-face time was spent with the patient providing counseling and coordination of care.     [] Stable [] Worse [] Reopened

## 2025-06-11 ENCOUNTER — HOSPITAL ENCOUNTER (OUTPATIENT)
Dept: WOUND CARE | Age: 80
Discharge: HOME | End: 2025-06-11
Attending: NURSE PRACTITIONER

## 2025-06-11 ENCOUNTER — TELEPHONE (OUTPATIENT)
Dept: FAMILY MEDICINE CLINIC | Age: 80
End: 2025-06-11

## 2025-06-11 DIAGNOSIS — M16.0 PRIMARY OSTEOARTHRITIS OF BOTH HIPS: Primary | ICD-10-CM

## 2025-06-11 NOTE — PATIENT INSTRUCTIONS
PHYSICIAN ORDERS AND DISCHARGE INSTRUCTIONS     Wound Care Notes:           Orders for this week:  2025       Right Buttock:Wash with soap and water. Pat dry.   Apply puracol ag to wound bed   Cover with Small Zetuvit Plus Border  Change: Every other day       Dispense 30 day quantity when ordering supplies.  Plan:  Ordered supplies 25      Follow Up Instructions: 1 week   Primary Wound Care Provider: Suzi Garcia CNP  Call  for any questions or concerns.  Central [phone] for imaging and lab work

## 2025-06-11 NOTE — TELEPHONE ENCOUNTER
Patient called requesting an X-ray on her hip. Patient states that she has been having a lot of pain recently.

## 2025-06-12 ENCOUNTER — HOSPITAL ENCOUNTER (OUTPATIENT)
Dept: GENERAL RADIOLOGY | Age: 80
Discharge: HOME | End: 2025-06-12
Payer: MEDICARE

## 2025-06-12 DIAGNOSIS — M16.0 PRIMARY OSTEOARTHRITIS OF BOTH HIPS: ICD-10-CM

## 2025-06-12 PROCEDURE — 73522 X-RAY EXAM HIPS BI 3-4 VIEWS: CPT

## 2025-06-13 ENCOUNTER — RESULTS FOLLOW-UP (OUTPATIENT)
Dept: FAMILY MEDICINE CLINIC | Age: 80
End: 2025-06-13

## 2025-06-13 ENCOUNTER — OFFICE VISIT (OUTPATIENT)
Dept: FAMILY MEDICINE CLINIC | Age: 80
End: 2025-06-13
Payer: MEDICARE

## 2025-06-13 VITALS
TEMPERATURE: 97.7 F | SYSTOLIC BLOOD PRESSURE: 126 MMHG | BODY MASS INDEX: 41.45 KG/M2 | HEART RATE: 54 BPM | OXYGEN SATURATION: 97 % | DIASTOLIC BLOOD PRESSURE: 74 MMHG | WEIGHT: 226.6 LBS

## 2025-06-13 DIAGNOSIS — G89.29 CHRONIC BILATERAL LOW BACK PAIN WITH BILATERAL SCIATICA: Chronic | ICD-10-CM

## 2025-06-13 DIAGNOSIS — M79.89 LEG SWELLING: ICD-10-CM

## 2025-06-13 DIAGNOSIS — F41.1 GAD (GENERALIZED ANXIETY DISORDER): Chronic | ICD-10-CM

## 2025-06-13 DIAGNOSIS — N39.41 URGE INCONTINENCE: ICD-10-CM

## 2025-06-13 DIAGNOSIS — M54.42 CHRONIC BILATERAL LOW BACK PAIN WITH BILATERAL SCIATICA: Chronic | ICD-10-CM

## 2025-06-13 DIAGNOSIS — M46.1 ARTHRITIS OF SACROILIAC JOINT: Primary | ICD-10-CM

## 2025-06-13 DIAGNOSIS — M48.062 SPINAL STENOSIS OF LUMBAR REGION WITH NEUROGENIC CLAUDICATION: ICD-10-CM

## 2025-06-13 DIAGNOSIS — E66.813 CLASS 3 SEVERE OBESITY DUE TO EXCESS CALORIES WITH SERIOUS COMORBIDITY AND BODY MASS INDEX (BMI) OF 40.0 TO 44.9 IN ADULT (HCC): ICD-10-CM

## 2025-06-13 DIAGNOSIS — M85.852 OSTEOPENIA OF LEFT HIP: ICD-10-CM

## 2025-06-13 DIAGNOSIS — M54.41 CHRONIC BILATERAL LOW BACK PAIN WITH BILATERAL SCIATICA: Chronic | ICD-10-CM

## 2025-06-13 PROCEDURE — 1090F PRES/ABSN URINE INCON ASSESS: CPT | Performed by: FAMILY MEDICINE

## 2025-06-13 PROCEDURE — 1123F ACP DISCUSS/DSCN MKR DOCD: CPT | Performed by: FAMILY MEDICINE

## 2025-06-13 PROCEDURE — G8427 DOCREV CUR MEDS BY ELIG CLIN: HCPCS | Performed by: FAMILY MEDICINE

## 2025-06-13 PROCEDURE — 99214 OFFICE O/P EST MOD 30 MIN: CPT | Performed by: FAMILY MEDICINE

## 2025-06-13 PROCEDURE — G8417 CALC BMI ABV UP PARAM F/U: HCPCS | Performed by: FAMILY MEDICINE

## 2025-06-13 PROCEDURE — 0509F URINE INCON PLAN DOCD: CPT | Performed by: FAMILY MEDICINE

## 2025-06-13 PROCEDURE — 1036F TOBACCO NON-USER: CPT | Performed by: FAMILY MEDICINE

## 2025-06-13 PROCEDURE — G8400 PT W/DXA NO RESULTS DOC: HCPCS | Performed by: FAMILY MEDICINE

## 2025-06-13 PROCEDURE — 1159F MED LIST DOCD IN RCRD: CPT | Performed by: FAMILY MEDICINE

## 2025-06-13 RX ORDER — OXYCODONE AND ACETAMINOPHEN 10; 325 MG/1; MG/1
1 TABLET ORAL EVERY 8 HOURS PRN
Qty: 90 TABLET | Refills: 0 | Status: SHIPPED | OUTPATIENT
Start: 2025-06-13 | End: 2025-07-13

## 2025-06-13 RX ORDER — MIRABEGRON 25 MG/1
TABLET, FILM COATED, EXTENDED RELEASE ORAL
Qty: 90 TABLET | Refills: 2 | Status: SHIPPED | OUTPATIENT
Start: 2025-06-13

## 2025-06-13 RX ORDER — HYDROXYZINE PAMOATE 25 MG/1
25 CAPSULE ORAL 3 TIMES DAILY PRN
Qty: 90 CAPSULE | Refills: 2 | Status: SHIPPED | OUTPATIENT
Start: 2025-06-13

## 2025-06-13 RX ORDER — TRIAMTERENE AND HYDROCHLOROTHIAZIDE 37.5; 25 MG/1; MG/1
1 TABLET ORAL DAILY PRN
Qty: 90 TABLET | Refills: 1 | Status: SHIPPED | OUTPATIENT
Start: 2025-06-13

## 2025-06-13 NOTE — PROGRESS NOTES
6/13/25    Nereyda Reynaga  1945    SUBJECTIVE    HPI - Nereyda is a 80 y.o. female who presents today for evaluation of:  Chief Complaint   Patient presents with    1 Month Follow-Up     Back pain    Results     Xray results         History of Present Illness  The patient presents for evaluation of hip and back pain.    She reports experiencing significant pain in her leg and joint, which has been causing difficulty in lifting her leg due to swelling. This morning, she struggled to put on her shoe and has been finding it challenging to get in and out of her car. Despite these issues, she maintains an active lifestyle with regular walking. She has been making efforts to lose weight, including dietary changes such as increased consumption of raw vegetables and reduced sugar intake. She also reports experiencing severe pain when sitting in her recliner. She is interested in trying physical therapy.    She reports no presence of blood in her urine or any fevers.    She does not have trouble controlling worrying. She feels anxious, nervous, or on the edge sometimes. She does not have trouble stopping or controlling worrying. She worries too much about different things more than half of the time. She has trouble relaxing all the time. She is restless all the time. She becomes easily annoyed or irritable. She feels afraid something awful might happen sometimes.    PAST SURGICAL HISTORY:  Bilateral total hip arthroplasties      Review of Systems   Constitutional:  Negative for fever.   Genitourinary:  Negative for hematuria.       Allergies   Allergen Reactions    Latex     Prednisone Hives        OBJECTIVE    /74 (BP Site: Left Upper Arm, Patient Position: Sitting, BP Cuff Size: Large Adult)   Pulse 54   Temp 97.7 °F (36.5 °C) (Infrared)   Wt 102.8 kg (226 lb 9.6 oz)   SpO2 97%   BMI 41.45 kg/m²     Physical Exam   Constitutional:       General: Not in acute distress. Uses a walker.      Appearance: Normal

## 2025-06-24 ENCOUNTER — TELEPHONE (OUTPATIENT)
Dept: CARDIOLOGY CLINIC | Age: 80
End: 2025-06-24

## 2025-06-25 ENCOUNTER — PATIENT MESSAGE (OUTPATIENT)
Dept: OTHER | Facility: CLINIC | Age: 80
End: 2025-06-25

## 2025-06-26 ENCOUNTER — TELEPHONE (OUTPATIENT)
Dept: CARDIOLOGY CLINIC | Age: 80
End: 2025-06-26

## 2025-06-26 NOTE — TELEPHONE ENCOUNTER
Left a Vm for pt to call back and schedule a consult visit with the first available provider. Pt has cx two appointments.   06/10-Transportation conflict   06/24- Illness.     Referral by Dr. Jacob Og   DX: Dizzy Spells and Bradycardia     1st attempt to contact patient 06/26

## 2025-06-30 ENCOUNTER — TELEPHONE (OUTPATIENT)
Dept: CARDIOLOGY CLINIC | Age: 80
End: 2025-06-30

## 2025-06-30 NOTE — TELEPHONE ENCOUNTER
Left a VM for pt to call back and schedule a consult visit with the first available provider.     Referral by Dr. Jacob Og   DX: Dizzy Spells and Bradycardia.     3rd attempt at contacting patient; 05/12, 06/24 and 06/30.     My Chart message sent on 5/12 and 06/30

## 2025-07-08 ENCOUNTER — TELEPHONE (OUTPATIENT)
Dept: WOUND CARE | Age: 80
End: 2025-07-08

## 2025-07-14 ENCOUNTER — OFFICE VISIT (OUTPATIENT)
Dept: FAMILY MEDICINE CLINIC | Age: 80
End: 2025-07-14
Payer: MEDICARE

## 2025-07-14 VITALS
OXYGEN SATURATION: 94 % | BODY MASS INDEX: 40.64 KG/M2 | SYSTOLIC BLOOD PRESSURE: 128 MMHG | DIASTOLIC BLOOD PRESSURE: 84 MMHG | HEART RATE: 60 BPM | WEIGHT: 222.2 LBS

## 2025-07-14 DIAGNOSIS — M54.42 CHRONIC BILATERAL LOW BACK PAIN WITH BILATERAL SCIATICA: Chronic | ICD-10-CM

## 2025-07-14 DIAGNOSIS — F43.21 GRIEF: Primary | ICD-10-CM

## 2025-07-14 DIAGNOSIS — F41.1 GAD (GENERALIZED ANXIETY DISORDER): ICD-10-CM

## 2025-07-14 DIAGNOSIS — M48.062 SPINAL STENOSIS OF LUMBAR REGION WITH NEUROGENIC CLAUDICATION: ICD-10-CM

## 2025-07-14 DIAGNOSIS — G89.29 CHRONIC BILATERAL LOW BACK PAIN WITH BILATERAL SCIATICA: Chronic | ICD-10-CM

## 2025-07-14 DIAGNOSIS — M54.41 CHRONIC BILATERAL LOW BACK PAIN WITH BILATERAL SCIATICA: Chronic | ICD-10-CM

## 2025-07-14 DIAGNOSIS — M46.1 ARTHRITIS OF SACROILIAC JOINT: ICD-10-CM

## 2025-07-14 PROCEDURE — 99213 OFFICE O/P EST LOW 20 MIN: CPT | Performed by: FAMILY MEDICINE

## 2025-07-14 PROCEDURE — 1123F ACP DISCUSS/DSCN MKR DOCD: CPT | Performed by: FAMILY MEDICINE

## 2025-07-14 PROCEDURE — G8400 PT W/DXA NO RESULTS DOC: HCPCS | Performed by: FAMILY MEDICINE

## 2025-07-14 PROCEDURE — 1159F MED LIST DOCD IN RCRD: CPT | Performed by: FAMILY MEDICINE

## 2025-07-14 PROCEDURE — G8417 CALC BMI ABV UP PARAM F/U: HCPCS | Performed by: FAMILY MEDICINE

## 2025-07-14 PROCEDURE — G8427 DOCREV CUR MEDS BY ELIG CLIN: HCPCS | Performed by: FAMILY MEDICINE

## 2025-07-14 PROCEDURE — 1090F PRES/ABSN URINE INCON ASSESS: CPT | Performed by: FAMILY MEDICINE

## 2025-07-14 PROCEDURE — 1036F TOBACCO NON-USER: CPT | Performed by: FAMILY MEDICINE

## 2025-07-14 RX ORDER — SERTRALINE HYDROCHLORIDE 100 MG/1
100 TABLET, FILM COATED ORAL NIGHTLY
Qty: 90 TABLET | Refills: 0 | Status: SHIPPED | OUTPATIENT
Start: 2025-07-14

## 2025-07-14 RX ORDER — TIZANIDINE 2 MG/1
2 TABLET ORAL 3 TIMES DAILY PRN
Qty: 60 TABLET | Refills: 1 | Status: SHIPPED | OUTPATIENT
Start: 2025-07-14

## 2025-07-14 RX ORDER — OXYCODONE AND ACETAMINOPHEN 10; 325 MG/1; MG/1
1 TABLET ORAL EVERY 8 HOURS PRN
Qty: 90 TABLET | Refills: 0 | Status: SHIPPED | OUTPATIENT
Start: 2025-07-14 | End: 2025-08-13

## 2025-07-14 NOTE — PROGRESS NOTES
manage her symptoms.  - Advised to engage in activities that provide a sense of meaning and purpose.  - Contact information for behavioral health counselors has been provided.    2/3/4. Back pain, SI joint pain, spinal stenosis.  - Prescription for oxycodone has been renewed to manage back pain.  - Muscle relaxer tizanidine has been prescribed.  - Discussed the sedating effect of tizanidine.  - Medication sent to pharmacy.    Follow-up  The patient will follow up in 1 month.          Return in about 30 days (around 8/13/2025) for Back pain grief.   Jacob Og MD       The patient (or guardian, if applicable) and other individuals in attendance with the patient were advised that Artificial Intelligence will be utilized during this visit to record and process the conversation to generate a clinical note. The patient (or guardian, if applicable) and other individuals in attendance at the appointment consented to the use of AI, including the recording.

## 2025-08-13 ENCOUNTER — OFFICE VISIT (OUTPATIENT)
Dept: FAMILY MEDICINE CLINIC | Age: 80
End: 2025-08-13
Payer: MEDICARE

## 2025-08-13 VITALS
BODY MASS INDEX: 40.79 KG/M2 | OXYGEN SATURATION: 91 % | HEART RATE: 68 BPM | WEIGHT: 223 LBS | DIASTOLIC BLOOD PRESSURE: 80 MMHG | SYSTOLIC BLOOD PRESSURE: 110 MMHG

## 2025-08-13 DIAGNOSIS — G25.81 RLS (RESTLESS LEGS SYNDROME): ICD-10-CM

## 2025-08-13 DIAGNOSIS — N39.41 URGE INCONTINENCE: Chronic | ICD-10-CM

## 2025-08-13 DIAGNOSIS — M54.42 CHRONIC BILATERAL LOW BACK PAIN WITH BILATERAL SCIATICA: Primary | Chronic | ICD-10-CM

## 2025-08-13 DIAGNOSIS — J45.30 MILD PERSISTENT ASTHMA WITHOUT COMPLICATION: ICD-10-CM

## 2025-08-13 DIAGNOSIS — D50.9 IRON DEFICIENCY ANEMIA, UNSPECIFIED IRON DEFICIENCY ANEMIA TYPE: ICD-10-CM

## 2025-08-13 DIAGNOSIS — Z86.79 HISTORY OF HYPERTENSION: ICD-10-CM

## 2025-08-13 DIAGNOSIS — G89.29 CHRONIC BILATERAL LOW BACK PAIN WITH BILATERAL SCIATICA: Primary | Chronic | ICD-10-CM

## 2025-08-13 DIAGNOSIS — E66.813 CLASS 3 SEVERE OBESITY DUE TO EXCESS CALORIES WITH SERIOUS COMORBIDITY AND BODY MASS INDEX (BMI) OF 40.0 TO 44.9 IN ADULT (HCC): ICD-10-CM

## 2025-08-13 DIAGNOSIS — F51.04 PSYCHOPHYSIOLOGICAL INSOMNIA: ICD-10-CM

## 2025-08-13 DIAGNOSIS — F41.1 GAD (GENERALIZED ANXIETY DISORDER): Chronic | ICD-10-CM

## 2025-08-13 DIAGNOSIS — M48.062 SPINAL STENOSIS OF LUMBAR REGION WITH NEUROGENIC CLAUDICATION: ICD-10-CM

## 2025-08-13 DIAGNOSIS — M46.1 ARTHRITIS OF SACROILIAC JOINT: ICD-10-CM

## 2025-08-13 DIAGNOSIS — R10.13 EPIGASTRIC PAIN: ICD-10-CM

## 2025-08-13 DIAGNOSIS — M54.41 CHRONIC BILATERAL LOW BACK PAIN WITH BILATERAL SCIATICA: Primary | Chronic | ICD-10-CM

## 2025-08-13 DIAGNOSIS — F33.41 RECURRENT MAJOR DEPRESSIVE DISORDER, IN PARTIAL REMISSION: Chronic | ICD-10-CM

## 2025-08-13 PROCEDURE — 0509F URINE INCON PLAN DOCD: CPT | Performed by: FAMILY MEDICINE

## 2025-08-13 PROCEDURE — G8427 DOCREV CUR MEDS BY ELIG CLIN: HCPCS | Performed by: FAMILY MEDICINE

## 2025-08-13 PROCEDURE — 1123F ACP DISCUSS/DSCN MKR DOCD: CPT | Performed by: FAMILY MEDICINE

## 2025-08-13 PROCEDURE — 1159F MED LIST DOCD IN RCRD: CPT | Performed by: FAMILY MEDICINE

## 2025-08-13 PROCEDURE — 99214 OFFICE O/P EST MOD 30 MIN: CPT | Performed by: FAMILY MEDICINE

## 2025-08-13 PROCEDURE — 1090F PRES/ABSN URINE INCON ASSESS: CPT | Performed by: FAMILY MEDICINE

## 2025-08-13 PROCEDURE — G8400 PT W/DXA NO RESULTS DOC: HCPCS | Performed by: FAMILY MEDICINE

## 2025-08-13 PROCEDURE — G8417 CALC BMI ABV UP PARAM F/U: HCPCS | Performed by: FAMILY MEDICINE

## 2025-08-13 PROCEDURE — 1036F TOBACCO NON-USER: CPT | Performed by: FAMILY MEDICINE

## 2025-08-13 RX ORDER — HYDROXYZINE PAMOATE 50 MG/1
50 CAPSULE ORAL 3 TIMES DAILY PRN
Qty: 90 CAPSULE | Refills: 1 | Status: SHIPPED | OUTPATIENT
Start: 2025-08-13

## 2025-08-13 RX ORDER — TIZANIDINE 2 MG/1
2 TABLET ORAL 3 TIMES DAILY PRN
Qty: 60 TABLET | Refills: 4 | Status: SHIPPED | OUTPATIENT
Start: 2025-08-13

## 2025-08-13 RX ORDER — FLUTICASONE FUROATE AND VILANTEROL 100; 25 UG/1; UG/1
1 POWDER RESPIRATORY (INHALATION) DAILY
Qty: 3 EACH | Refills: 2 | Status: SHIPPED | OUTPATIENT
Start: 2025-08-13

## 2025-08-13 RX ORDER — PANTOPRAZOLE SODIUM 40 MG/1
40 TABLET, DELAYED RELEASE ORAL
Qty: 90 TABLET | Refills: 2 | Status: SHIPPED | OUTPATIENT
Start: 2025-08-13

## 2025-08-13 RX ORDER — OXYCODONE AND ACETAMINOPHEN 10; 325 MG/1; MG/1
1 TABLET ORAL EVERY 8 HOURS PRN
Qty: 90 TABLET | Refills: 0 | Status: SHIPPED | OUTPATIENT
Start: 2025-08-13 | End: 2025-09-12

## 2025-08-13 RX ORDER — SERTRALINE HYDROCHLORIDE 100 MG/1
100 TABLET, FILM COATED ORAL NIGHTLY
Qty: 90 TABLET | Refills: 1 | Status: SHIPPED | OUTPATIENT
Start: 2025-08-13

## 2025-08-13 RX ORDER — TRAZODONE HYDROCHLORIDE 100 MG/1
100 TABLET ORAL NIGHTLY
Qty: 90 TABLET | Refills: 1 | Status: SHIPPED | OUTPATIENT
Start: 2025-08-13

## 2025-08-13 RX ORDER — ROPINIROLE 5 MG/1
5 TABLET, FILM COATED ORAL NIGHTLY
Qty: 90 TABLET | Refills: 1 | Status: SHIPPED | OUTPATIENT
Start: 2025-08-13

## 2025-08-13 ASSESSMENT — ENCOUNTER SYMPTOMS
CONSTIPATION: 0
DIARRHEA: 0
BACK PAIN: 1

## 2025-08-14 LAB
ALBUMIN SERPL-MCNC: 4.1 G/DL (ref 3.4–5)
ALBUMIN/GLOB SERPL: 1.5 {RATIO} (ref 1.1–2.2)
ALP SERPL-CCNC: 97 U/L (ref 40–129)
ALT SERPL-CCNC: 16 U/L (ref 10–40)
ANION GAP SERPL CALCULATED.3IONS-SCNC: 11 MMOL/L (ref 3–16)
AST SERPL-CCNC: 23 U/L (ref 15–37)
BASOPHILS # BLD: 0.1 K/UL (ref 0–0.2)
BASOPHILS NFR BLD: 0.8 %
BILIRUB SERPL-MCNC: 0.3 MG/DL (ref 0–1)
BUN SERPL-MCNC: 25 MG/DL (ref 7–20)
CALCIUM SERPL-MCNC: 8.9 MG/DL (ref 8.3–10.6)
CHLORIDE SERPL-SCNC: 103 MMOL/L (ref 99–110)
CO2 SERPL-SCNC: 24 MMOL/L (ref 21–32)
CREAT SERPL-MCNC: 1.2 MG/DL (ref 0.6–1.2)
DEPRECATED RDW RBC AUTO: 16 % (ref 12.4–15.4)
EOSINOPHIL # BLD: 0.1 K/UL (ref 0–0.6)
EOSINOPHIL NFR BLD: 1.8 %
GFR SERPLBLD CREATININE-BSD FMLA CKD-EPI: 46 ML/MIN/{1.73_M2}
GLUCOSE SERPL-MCNC: 100 MG/DL (ref 70–99)
HCT VFR BLD AUTO: 34.5 % (ref 36–48)
HGB BLD-MCNC: 11.5 G/DL (ref 12–16)
IRON SATN MFR SERPL: 12 % (ref 15–50)
IRON SERPL-MCNC: 48 UG/DL (ref 37–145)
LYMPHOCYTES # BLD: 2.4 K/UL (ref 1–5.1)
LYMPHOCYTES NFR BLD: 36.1 %
MCH RBC QN AUTO: 26.8 PG (ref 26–34)
MCHC RBC AUTO-ENTMCNC: 33.4 G/DL (ref 31–36)
MCV RBC AUTO: 80.3 FL (ref 80–100)
MONOCYTES # BLD: 0.5 K/UL (ref 0–1.3)
MONOCYTES NFR BLD: 7.8 %
NEUTROPHILS # BLD: 3.6 K/UL (ref 1.7–7.7)
NEUTROPHILS NFR BLD: 53.5 %
PLATELET # BLD AUTO: 349 K/UL (ref 135–450)
PMV BLD AUTO: 8.6 FL (ref 5–10.5)
POTASSIUM SERPL-SCNC: 4.7 MMOL/L (ref 3.5–5.1)
PROT SERPL-MCNC: 6.8 G/DL (ref 6.4–8.2)
RBC # BLD AUTO: 4.29 M/UL (ref 4–5.2)
SODIUM SERPL-SCNC: 138 MMOL/L (ref 136–145)
TIBC SERPL-MCNC: 398 UG/DL (ref 260–445)
WBC # BLD AUTO: 6.8 K/UL (ref 4–11)